# Patient Record
Sex: FEMALE | Race: ASIAN | Employment: OTHER | URBAN - METROPOLITAN AREA
[De-identification: names, ages, dates, MRNs, and addresses within clinical notes are randomized per-mention and may not be internally consistent; named-entity substitution may affect disease eponyms.]

---

## 2017-04-01 ENCOUNTER — HOSPITAL ENCOUNTER (EMERGENCY)
Facility: HOSPITAL | Age: 62
Discharge: HOME | End: 2017-04-01
Attending: EMERGENCY MEDICINE
Payer: COMMERCIAL

## 2017-04-01 VITALS
OXYGEN SATURATION: 97 % | HEIGHT: 64 IN | TEMPERATURE: 99 F | WEIGHT: 131 LBS | BODY MASS INDEX: 22.36 KG/M2 | SYSTOLIC BLOOD PRESSURE: 123 MMHG | RESPIRATION RATE: 16 BRPM | HEART RATE: 74 BPM | DIASTOLIC BLOOD PRESSURE: 85 MMHG

## 2017-04-01 DIAGNOSIS — M79.2 NEURALGIA: Primary | ICD-10-CM

## 2017-04-01 PROCEDURE — 81003 URINALYSIS AUTO W/O SCOPE: CPT | Performed by: EMERGENCY MEDICINE

## 2017-04-01 PROCEDURE — 80053 COMPREHEN METABOLIC PANEL: CPT | Performed by: EMERGENCY MEDICINE

## 2017-04-01 PROCEDURE — 96374 THER/PROPH/DIAG INJ IV PUSH: CPT

## 2017-04-01 PROCEDURE — 93005 ELECTROCARDIOGRAM TRACING: CPT

## 2017-04-01 PROCEDURE — 99285 EMERGENCY DEPT VISIT HI MDM: CPT

## 2017-04-01 PROCEDURE — 99284 EMERGENCY DEPT VISIT MOD MDM: CPT

## 2017-04-01 PROCEDURE — 93010 ELECTROCARDIOGRAM REPORT: CPT

## 2017-04-01 PROCEDURE — 85025 COMPLETE CBC W/AUTO DIFF WBC: CPT | Performed by: EMERGENCY MEDICINE

## 2017-04-01 PROCEDURE — 84484 ASSAY OF TROPONIN QUANT: CPT | Performed by: EMERGENCY MEDICINE

## 2017-04-01 PROCEDURE — 96361 HYDRATE IV INFUSION ADD-ON: CPT

## 2017-04-01 RX ORDER — ONDANSETRON 4 MG/1
TABLET, ORALLY DISINTEGRATING ORAL
Status: DISCONTINUED
Start: 2017-04-01 | End: 2017-04-01

## 2017-04-01 RX ORDER — ONDANSETRON 4 MG/1
4 TABLET, ORALLY DISINTEGRATING ORAL EVERY 4 HOURS PRN
Qty: 10 TABLET | Refills: 0 | Status: SHIPPED | OUTPATIENT
Start: 2017-04-01 | End: 2017-04-08

## 2017-04-01 RX ORDER — HYDROMORPHONE HYDROCHLORIDE 1 MG/ML
INJECTION, SOLUTION INTRAMUSCULAR; INTRAVENOUS; SUBCUTANEOUS EVERY 30 MIN PRN
Status: DISCONTINUED | OUTPATIENT
Start: 2017-04-01 | End: 2017-04-01

## 2017-04-01 RX ORDER — ONDANSETRON 4 MG/1
4 TABLET, ORALLY DISINTEGRATING ORAL ONCE
Status: DISCONTINUED | OUTPATIENT
Start: 2017-04-01 | End: 2017-04-01

## 2017-04-01 RX ORDER — PREGABALIN 75 MG/1
75 CAPSULE ORAL 2 TIMES DAILY
Qty: 60 CAPSULE | Refills: 0 | Status: SHIPPED | OUTPATIENT
Start: 2017-04-01 | End: 2017-04-04

## 2017-04-01 NOTE — ED PROVIDER NOTES
Patient Seen in: [facility] Emergency Department    History   Patient presents with:  Pain (neurologic)    Stated Complaint: pain    HPI    Patient comes in with pain similar to her neuralgia associated with shingles several years ago.   She says at Sebaceous cyst      chest   • Chest pain    • Hip pain    • Hemiparesis (HCC)    • Shingles    • Sinus disease 5/15/2008   • Leg pain      LE pain and weakness   • Sinus infection    • PHN (postherpetic neuralgia)    • Cervical radiculopathy 10/9/2007   • placement of marking clip (2009)    STEREOTACTIC BREAST BIOPSY  2/25/2008    Comment microcalcifications, L breast, and placement of metal clip    CATALINO NEEDLE LOCALIZATION W/ SPECIMEN 1 SITE LEFT  2008    CATALINO NEEDLE LOCALIZATION W/ SPECIMEN 1 SITE RIGHT  19 Temp(Src) 98.5 °F (36.9 °C) (Temporal)  Resp 16  Ht 162.6 cm (5' 4\")  Wt 59.421 kg  BMI 22.47 kg/m2  SpO2 97%        Physical Exam    General: Well-developed, well-nourished, tired, anxious, in mild to moderate distress secondary to pain  Head and neck: N Please view results for these tests on the individual orders. EKG    Rate, intervals and axes as noted on EKG Report.   Rate: 65  Rhythm: Sinus Rhythm  Reading: Incomplete right bundle            MDM     Patient's pain by history sounds like her

## 2017-04-01 NOTE — ED INITIAL ASSESSMENT (HPI)
Pt her for pain in abdomen and pelvis down. Pt as per patient is similar to the pain when she had shingles. Pt notes that she took ibuprofen and it just took the edge off. Sitting worsens the condition.

## 2017-04-04 ENCOUNTER — OFFICE VISIT (OUTPATIENT)
Dept: INTERNAL MEDICINE CLINIC | Facility: CLINIC | Age: 62
End: 2017-04-04

## 2017-04-04 VITALS
HEIGHT: 64 IN | SYSTOLIC BLOOD PRESSURE: 120 MMHG | DIASTOLIC BLOOD PRESSURE: 70 MMHG | RESPIRATION RATE: 16 BRPM | BODY MASS INDEX: 23.22 KG/M2 | WEIGHT: 136 LBS | HEART RATE: 64 BPM

## 2017-04-04 DIAGNOSIS — M54.50 LOW BACK PAIN RADIATING TO BOTH LEGS: Primary | ICD-10-CM

## 2017-04-04 DIAGNOSIS — M79.605 LOW BACK PAIN RADIATING TO BOTH LEGS: Primary | ICD-10-CM

## 2017-04-04 DIAGNOSIS — M79.604 LOW BACK PAIN RADIATING TO BOTH LEGS: Primary | ICD-10-CM

## 2017-04-04 PROCEDURE — 99213 OFFICE O/P EST LOW 20 MIN: CPT | Performed by: NURSE PRACTITIONER

## 2017-04-04 RX ORDER — METHYLPREDNISOLONE 4 MG/1
TABLET ORAL
Qty: 1 KIT | Refills: 0 | Status: SHIPPED | OUTPATIENT
Start: 2017-04-04 | End: 2017-04-11 | Stop reason: ALTCHOICE

## 2017-04-04 NOTE — PROGRESS NOTES
Gerry Gonzalez is a 64year old female. Patient presents with:  Pain: follow up to ER visit, patients states she is having bilateral hip pain that radiates to her groin and down to her feet. States aggrevated when sitting better when standing.   Discharged Cap Take 1 tablet by mouth 2 (two) times daily. Disp:  Rfl:    Calcium 500 MG Oral Tab Take 500 mg by mouth 2 (two) times daily.  Disp:  Rfl:       Past Medical History   Diagnosis Date   • Type II or unspecified type diabetes mellitus without mention of co breast 3/17/2008     left   • Breast calcifications    • Abnormal menses    • Uterine fibroid 9/5/2002     3 discrete uterine fibroids   • Dysphagia    • Hair loss    • Memory deficit    • Plantar fasciitis 11/8/2011   • Exostosis 11/8/2011     off the yoel radiating to both legs  (primary encounter diagnosis) trial medrol dose pack with tylenol. PT once pain improves. See me next week. No orders of the defined types were placed in this encounter.        Meds & Refills for this Visit:  Signed Brutus Snellen

## 2017-04-07 ENCOUNTER — TELEPHONE (OUTPATIENT)
Dept: INTERNAL MEDICINE CLINIC | Facility: CLINIC | Age: 62
End: 2017-04-07

## 2017-04-07 NOTE — TELEPHONE ENCOUNTER
Lm for pt (Bethanie Kraft per HIPAA) to inform, per SD, may start if pain controlled. To call back at the office if any further questions.

## 2017-04-11 ENCOUNTER — OFFICE VISIT (OUTPATIENT)
Dept: INTERNAL MEDICINE CLINIC | Facility: CLINIC | Age: 62
End: 2017-04-11

## 2017-04-11 VITALS
HEART RATE: 80 BPM | DIASTOLIC BLOOD PRESSURE: 80 MMHG | WEIGHT: 133 LBS | BODY MASS INDEX: 22.71 KG/M2 | HEIGHT: 64 IN | SYSTOLIC BLOOD PRESSURE: 110 MMHG | TEMPERATURE: 99 F

## 2017-04-11 DIAGNOSIS — M54.16 LUMBAR RADICULOPATHY: Primary | ICD-10-CM

## 2017-04-11 DIAGNOSIS — R73.9 HYPERGLYCEMIA: ICD-10-CM

## 2017-04-11 PROCEDURE — 99213 OFFICE O/P EST LOW 20 MIN: CPT | Performed by: NURSE PRACTITIONER

## 2017-04-11 NOTE — PROGRESS NOTES
Radha Jackson is a 58year old female. Patient presents with:  Pain: follow up on hip pain, leg pain and upper body pain. She states she is doing better       HPI:   here for follow up of her lumbar radiculopathy.   She is greatly improved following medrol diabetes mellitus without mention of complication, not stated as uncontrolled    • Other and unspecified hyperlipidemia    • Unspecified essential hypertension    • Atypical ductal hyperplasia of breast 7/13/2011   • Lumbar foraminal stenosis    • TMJ synd Exostosis 11/8/2011     off the dorsum of the foot at the 2nd metatarsal phalangeal joint   • Paresthesia    • Oligomenorrhea    • Positive H. pylori test 2005     + H. pylori serology   • HYPERTENSION NOS 8/8/2007   • [facility]est[facility]) ([facility] Hyperglycemia  Controlled. As above. Lumbar radiculopathy   Greatly improved with medrol dose pack. Cont aleve bid or ibuprofen tid. PT for stretching exercises instead of her .      No orders of the defined types were placed in this encount

## 2017-05-30 ENCOUNTER — TELEPHONE (OUTPATIENT)
Dept: INTERNAL MEDICINE CLINIC | Facility: CLINIC | Age: 62
End: 2017-05-30

## 2017-05-30 DIAGNOSIS — E87.1 HYPONATREMIA: Primary | ICD-10-CM

## 2017-05-30 DIAGNOSIS — D69.6 THROMBOCYTOPENIA (HCC): ICD-10-CM

## 2017-05-30 NOTE — TELEPHONE ENCOUNTER
Slight abnormalities noted at time of ov. Orders placed to repeat CMP and CBC to check sodium and platelets  Nonfasting.

## 2017-05-30 NOTE — TELEPHONE ENCOUNTER
vmmltcb to nurses for SD remarks in this message(last labs had slight elev glucose, low Na & Cl). Copy of labs printed and mailed to pt today, could take up to 1 wk to receive.

## 2017-06-28 ENCOUNTER — OFFICE VISIT (OUTPATIENT)
Dept: SURGERY | Facility: CLINIC | Age: 62
End: 2017-06-28

## 2017-06-28 VITALS
HEIGHT: 64 IN | DIASTOLIC BLOOD PRESSURE: 82 MMHG | RESPIRATION RATE: 18 BRPM | HEART RATE: 89 BPM | TEMPERATURE: 99 F | SYSTOLIC BLOOD PRESSURE: 118 MMHG | BODY MASS INDEX: 22.2 KG/M2 | WEIGHT: 130 LBS

## 2017-06-28 DIAGNOSIS — L72.3 SEBACEOUS CYST: Primary | ICD-10-CM

## 2017-06-28 PROCEDURE — 99243 OFF/OP CNSLTJ NEW/EST LOW 30: CPT | Performed by: SURGERY

## 2017-06-28 NOTE — H&P
New Patient Visit Note       Active Problems      1. Sebaceous cyst        Chief Complaint   Patient presents with:  Cyst: New patient referred by Lizzy Al for sebaceous cyst on chest.  PT has had cyst for about 10yrs and started growing about 1yr ago. metatarsal phalangeal joint   • Fatigue 3/6/2012   • Fatigue    • Feels cold 3/6/2012   • Hair loss    • Hand tingling    • Headache    • Hemiparesis (HCC)    • Hip pain    • History of bone density study 10/2/2010   • HYPERTENSION NOS 8/8/2007   • Knee pa STEREOTACTIC BREAST BIOPSY      Comment: microcalcifications, L breast, and placement                of metal clip    The family history and social history have been reviewed by me today.     Family History   Problem Relation Age of Onset   • Other[other] [ palpitations and leg swelling. Gastrointestinal: Negative for abdominal distention, abdominal pain, anal bleeding, blood in stool, constipation, diarrhea, nausea and vomiting.    Genitourinary: Negative for difficulty urinating, dysuria, frequency and urg patient to do the procedure under local versus sedation. The patient prefers sedation. She wishes to think further about having the cyst removed and will call the office when she is ready to schedule the procedure.       Diana Anaya MD

## 2017-07-10 ENCOUNTER — TELEPHONE (OUTPATIENT)
Dept: INTERNAL MEDICINE CLINIC | Facility: CLINIC | Age: 62
End: 2017-07-10

## 2017-07-10 DIAGNOSIS — Z13.21 SCREENING FOR ENDOCRINE, NUTRITIONAL, METABOLIC AND IMMUNITY DISORDER: ICD-10-CM

## 2017-07-10 DIAGNOSIS — Z13.29 SCREENING FOR THYROID DISORDER: ICD-10-CM

## 2017-07-10 DIAGNOSIS — Z13.0 SCREENING FOR BLOOD DISEASE: ICD-10-CM

## 2017-07-10 DIAGNOSIS — Z13.220 SCREENING FOR LIPOID DISORDERS: ICD-10-CM

## 2017-07-10 DIAGNOSIS — Z13.29 SCREENING FOR ENDOCRINE, NUTRITIONAL, METABOLIC AND IMMUNITY DISORDER: ICD-10-CM

## 2017-07-10 DIAGNOSIS — Z13.228 SCREENING FOR ENDOCRINE, NUTRITIONAL, METABOLIC AND IMMUNITY DISORDER: ICD-10-CM

## 2017-07-10 DIAGNOSIS — Z00.00 ROUTINE GENERAL MEDICAL EXAMINATION AT A HEALTH CARE FACILITY: Primary | ICD-10-CM

## 2017-07-10 DIAGNOSIS — Z13.0 SCREENING FOR ENDOCRINE, NUTRITIONAL, METABOLIC AND IMMUNITY DISORDER: ICD-10-CM

## 2017-08-31 ENCOUNTER — TELEPHONE (OUTPATIENT)
Dept: INTERNAL MEDICINE CLINIC | Facility: CLINIC | Age: 62
End: 2017-08-31

## 2017-08-31 DIAGNOSIS — R73.09 ELEVATED GLUCOSE: Primary | ICD-10-CM

## 2017-08-31 NOTE — TELEPHONE ENCOUNTER
Last A1C 8/13/16=5.7. LOV 4/11/17 with SD.  FOV 9/5/17 with AS.    AS, Order for A1C pended. OK to order?

## 2017-08-31 NOTE — TELEPHONE ENCOUNTER
Pt is asking that an A1C order be placed, stated she has it done yearly. Has appt with AS on 9-5-17 for CPE.

## 2017-09-01 ENCOUNTER — LAB ENCOUNTER (OUTPATIENT)
Dept: LAB | Facility: HOSPITAL | Age: 62
End: 2017-09-01
Attending: INTERNAL MEDICINE
Payer: COMMERCIAL

## 2017-09-01 DIAGNOSIS — Z13.228 SCREENING FOR ENDOCRINE, NUTRITIONAL, METABOLIC AND IMMUNITY DISORDER: ICD-10-CM

## 2017-09-01 DIAGNOSIS — D69.6 THROMBOCYTOPENIA (HCC): ICD-10-CM

## 2017-09-01 DIAGNOSIS — Z00.00 ROUTINE GENERAL MEDICAL EXAMINATION AT A HEALTH CARE FACILITY: ICD-10-CM

## 2017-09-01 DIAGNOSIS — Z13.29 SCREENING FOR ENDOCRINE, NUTRITIONAL, METABOLIC AND IMMUNITY DISORDER: ICD-10-CM

## 2017-09-01 DIAGNOSIS — Z13.21 SCREENING FOR ENDOCRINE, NUTRITIONAL, METABOLIC AND IMMUNITY DISORDER: ICD-10-CM

## 2017-09-01 DIAGNOSIS — R73.09 ELEVATED GLUCOSE: ICD-10-CM

## 2017-09-01 DIAGNOSIS — Z13.220 SCREENING FOR LIPOID DISORDERS: ICD-10-CM

## 2017-09-01 DIAGNOSIS — E87.1 HYPONATREMIA: ICD-10-CM

## 2017-09-01 DIAGNOSIS — Z13.0 SCREENING FOR ENDOCRINE, NUTRITIONAL, METABOLIC AND IMMUNITY DISORDER: ICD-10-CM

## 2017-09-01 DIAGNOSIS — Z13.0 SCREENING FOR BLOOD DISEASE: ICD-10-CM

## 2017-09-01 DIAGNOSIS — Z13.29 SCREENING FOR THYROID DISORDER: ICD-10-CM

## 2017-09-01 LAB
ALBUMIN SERPL-MCNC: 4 G/DL (ref 3.5–4.8)
ALP LIVER SERPL-CCNC: 68 U/L (ref 50–130)
ALT SERPL-CCNC: 27 U/L (ref 14–54)
AST SERPL-CCNC: 16 U/L (ref 15–41)
BASOPHILS # BLD AUTO: 0.03 X10(3) UL (ref 0–0.1)
BASOPHILS NFR BLD AUTO: 0.6 %
BILIRUB SERPL-MCNC: 0.6 MG/DL (ref 0.1–2)
BUN BLD-MCNC: 18 MG/DL (ref 8–20)
CALCIUM BLD-MCNC: 9.3 MG/DL (ref 8.3–10.3)
CHLORIDE: 105 MMOL/L (ref 101–111)
CHOLEST SMN-MCNC: 199 MG/DL (ref ?–200)
CO2: 29 MMOL/L (ref 22–32)
CREAT BLD-MCNC: 0.64 MG/DL (ref 0.55–1.02)
EOSINOPHIL # BLD AUTO: 0.03 X10(3) UL (ref 0–0.3)
EOSINOPHIL NFR BLD AUTO: 0.6 %
ERYTHROCYTE [DISTWIDTH] IN BLOOD BY AUTOMATED COUNT: 12.4 % (ref 11.5–16)
EST. AVERAGE GLUCOSE BLD GHB EST-MCNC: 123 MG/DL (ref 68–126)
GLUCOSE BLD-MCNC: 88 MG/DL (ref 70–99)
HBA1C MFR BLD HPLC: 5.9 % (ref ?–5.7)
HCT VFR BLD AUTO: 47 % (ref 34–50)
HDLC SERPL-MCNC: 91 MG/DL (ref 45–?)
HDLC SERPL: 2.19 {RATIO} (ref ?–4.44)
HGB BLD-MCNC: 15.4 G/DL (ref 12–16)
IMMATURE GRANULOCYTE COUNT: 0.01 X10(3) UL (ref 0–1)
IMMATURE GRANULOCYTE RATIO %: 0.2 %
LDLC SERPL CALC-MCNC: 93 MG/DL (ref ?–130)
LDLC SERPL-MCNC: 15 MG/DL (ref 5–40)
LYMPHOCYTES # BLD AUTO: 1.83 X10(3) UL (ref 0.9–4)
LYMPHOCYTES NFR BLD AUTO: 38.5 %
M PROTEIN MFR SERPL ELPH: 7.8 G/DL (ref 6.1–8.3)
MCH RBC QN AUTO: 28.9 PG (ref 27–33.2)
MCHC RBC AUTO-ENTMCNC: 32.8 G/DL (ref 31–37)
MCV RBC AUTO: 88.3 FL (ref 81–100)
MONOCYTES # BLD AUTO: 0.31 X10(3) UL (ref 0.1–0.6)
MONOCYTES NFR BLD AUTO: 6.5 %
NEUTROPHIL ABS PRELIM: 2.54 X10 (3) UL (ref 1.3–6.7)
NEUTROPHILS # BLD AUTO: 2.54 X10(3) UL (ref 1.3–6.7)
NEUTROPHILS NFR BLD AUTO: 53.6 %
NONHDLC SERPL-MCNC: 108 MG/DL (ref ?–130)
PLATELET # BLD AUTO: 173 10(3)UL (ref 150–450)
POTASSIUM SERPL-SCNC: 4.3 MMOL/L (ref 3.6–5.1)
RBC # BLD AUTO: 5.32 X10(6)UL (ref 3.8–5.1)
RED CELL DISTRIBUTION WIDTH-SD: 40 FL (ref 35.1–46.3)
SODIUM SERPL-SCNC: 140 MMOL/L (ref 136–144)
TRIGLYCERIDES: 75 MG/DL (ref ?–150)
TSI SER-ACNC: 3.01 MIU/ML (ref 0.35–5.5)
WBC # BLD AUTO: 4.8 X10(3) UL (ref 4–13)

## 2017-09-01 PROCEDURE — 85025 COMPLETE CBC W/AUTO DIFF WBC: CPT

## 2017-09-01 PROCEDURE — 84443 ASSAY THYROID STIM HORMONE: CPT

## 2017-09-01 PROCEDURE — 80053 COMPREHEN METABOLIC PANEL: CPT

## 2017-09-01 PROCEDURE — 36415 COLL VENOUS BLD VENIPUNCTURE: CPT

## 2017-09-01 PROCEDURE — 83036 HEMOGLOBIN GLYCOSYLATED A1C: CPT

## 2017-09-01 PROCEDURE — 80061 LIPID PANEL: CPT

## 2017-09-05 ENCOUNTER — OFFICE VISIT (OUTPATIENT)
Dept: INTERNAL MEDICINE CLINIC | Facility: CLINIC | Age: 62
End: 2017-09-05

## 2017-09-05 VITALS
SYSTOLIC BLOOD PRESSURE: 120 MMHG | HEIGHT: 64 IN | WEIGHT: 132 LBS | BODY MASS INDEX: 22.53 KG/M2 | RESPIRATION RATE: 16 BRPM | TEMPERATURE: 99 F | HEART RATE: 80 BPM | DIASTOLIC BLOOD PRESSURE: 82 MMHG

## 2017-09-05 DIAGNOSIS — E78.2 MIXED HYPERLIPIDEMIA: ICD-10-CM

## 2017-09-05 DIAGNOSIS — Z00.00 PE (PHYSICAL EXAM), ANNUAL: Primary | ICD-10-CM

## 2017-09-05 DIAGNOSIS — N60.99 ATYPICAL DUCTAL HYPERPLASIA OF BREAST: ICD-10-CM

## 2017-09-05 DIAGNOSIS — Z13.820 SCREENING FOR OSTEOPOROSIS: ICD-10-CM

## 2017-09-05 PROCEDURE — 99396 PREV VISIT EST AGE 40-64: CPT | Performed by: INTERNAL MEDICINE

## 2017-09-05 RX ORDER — INFLUENZA VIRUS VACCINE 15; 15; 15; 15 UG/.5ML; UG/.5ML; UG/.5ML; UG/.5ML
SUSPENSION INTRAMUSCULAR
Refills: 0 | COMMUNITY
Start: 2017-09-02 | End: 2017-09-05
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
## 2017-09-23 ENCOUNTER — HOSPITAL ENCOUNTER (OUTPATIENT)
Dept: BONE DENSITY | Age: 62
Discharge: HOME | End: 2017-09-23
Attending: INTERNAL MEDICINE
Payer: COMMERCIAL

## 2017-09-23 DIAGNOSIS — M81.0 OSTEOPOROSIS: ICD-10-CM

## 2017-09-23 DIAGNOSIS — Z13.820 SCREENING FOR OSTEOPOROSIS: ICD-10-CM

## 2017-09-23 PROCEDURE — 77080 DXA BONE DENSITY AXIAL: CPT | Performed by: INTERNAL MEDICINE

## 2017-09-28 ENCOUNTER — TELEPHONE (OUTPATIENT)
Dept: INTERNAL MEDICINE CLINIC | Facility: CLINIC | Age: 62
End: 2017-09-28

## 2017-11-09 ENCOUNTER — OFFICE VISIT (OUTPATIENT)
Dept: INTERNAL MEDICINE CLINIC | Facility: CLINIC | Age: 62
End: 2017-11-09

## 2017-11-09 VITALS
DIASTOLIC BLOOD PRESSURE: 60 MMHG | SYSTOLIC BLOOD PRESSURE: 114 MMHG | OXYGEN SATURATION: 98 % | HEART RATE: 83 BPM | BODY MASS INDEX: 21.68 KG/M2 | RESPIRATION RATE: 16 BRPM | WEIGHT: 127 LBS | HEIGHT: 64 IN

## 2017-11-09 DIAGNOSIS — J34.89 SINUS PRESSURE: Primary | ICD-10-CM

## 2017-11-09 DIAGNOSIS — F41.9 ANXIETY: ICD-10-CM

## 2017-11-09 DIAGNOSIS — R68.84 JAW PAIN: ICD-10-CM

## 2017-11-09 PROCEDURE — 99213 OFFICE O/P EST LOW 20 MIN: CPT | Performed by: PHYSICIAN ASSISTANT

## 2017-11-09 RX ORDER — FLUTICASONE PROPIONATE 50 MCG
2 SPRAY, SUSPENSION (ML) NASAL DAILY
Qty: 1 BOTTLE | Refills: 0 | Status: SHIPPED | OUTPATIENT
Start: 2017-11-09 | End: 2017-11-30

## 2017-11-09 RX ORDER — ALPRAZOLAM 0.25 MG/1
0.25 TABLET ORAL 2 TIMES DAILY PRN
Qty: 5 TABLET | Refills: 0 | Status: SHIPPED | OUTPATIENT
Start: 2017-11-09 | End: 2018-11-16 | Stop reason: ALTCHOICE

## 2017-11-09 NOTE — PATIENT INSTRUCTIONS
Can use Ibuprofen 200 mg 1-2 tablets by mouth every 8 hours as needed for pain.     Jaw rest for 1-2 weeks, no chewing gum, crunchy foods etc.
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
## 2017-11-10 ENCOUNTER — HOSPITAL ENCOUNTER (OUTPATIENT)
Dept: MAMMOGRAPHY | Age: 62
Discharge: HOME | End: 2017-11-10
Attending: OBSTETRICS & GYNECOLOGY
Payer: COMMERCIAL

## 2017-11-10 DIAGNOSIS — Z12.31 ENCOUNTER FOR SCREENING MAMMOGRAM FOR MALIGNANT NEOPLASM OF BREAST: ICD-10-CM

## 2017-11-10 PROCEDURE — 77067 SCR MAMMO BI INCL CAD: CPT | Performed by: OBSTETRICS & GYNECOLOGY

## 2017-11-30 RX ORDER — FLUTICASONE PROPIONATE 50 MCG
SPRAY, SUSPENSION (ML) NASAL
Qty: 16 G | Refills: 0 | Status: SHIPPED | OUTPATIENT
Start: 2017-11-30 | End: 2018-03-20

## 2018-01-29 RX ORDER — LANCETS
EACH MISCELLANEOUS
Qty: 200 EACH | Refills: 1 | Status: SHIPPED | OUTPATIENT
Start: 2018-01-29 | End: 2018-08-29

## 2018-02-15 ENCOUNTER — TELEPHONE (OUTPATIENT)
Dept: INTERNAL MEDICINE CLINIC | Facility: CLINIC | Age: 63
End: 2018-02-15

## 2018-02-15 NOTE — TELEPHONE ENCOUNTER
Lm for pt ([id number] per HIPAA) will need F/U OV to further discuss/fill out and sign DNR form. Pt will need to fill out portion as well. To call back at the office to schedule or if any further questions.

## 2018-02-15 NOTE — TELEPHONE ENCOUNTER
Patient states she is reading a book and it states that her PCP will have the [place]. She wants to know if we have this form? She states it has to do with not wanting CPR when she is passing away.   She states we can let her know by ca

## 2018-02-22 ENCOUNTER — OFFICE VISIT (OUTPATIENT)
Dept: INTERNAL MEDICINE CLINIC | Facility: CLINIC | Age: 63
End: 2018-02-22

## 2018-02-22 ENCOUNTER — TELEPHONE (OUTPATIENT)
Dept: INTERNAL MEDICINE CLINIC | Facility: CLINIC | Age: 63
End: 2018-02-22

## 2018-02-22 VITALS
HEIGHT: 64 IN | BODY MASS INDEX: 21.51 KG/M2 | RESPIRATION RATE: 16 BRPM | SYSTOLIC BLOOD PRESSURE: 118 MMHG | HEART RATE: 76 BPM | DIASTOLIC BLOOD PRESSURE: 74 MMHG | WEIGHT: 126 LBS | TEMPERATURE: 98 F

## 2018-02-22 DIAGNOSIS — Z13.29 SCREENING FOR THYROID DISORDER: ICD-10-CM

## 2018-02-22 DIAGNOSIS — M85.80 OSTEOPENIA, UNSPECIFIED LOCATION: ICD-10-CM

## 2018-02-22 DIAGNOSIS — L02.221 FURUNCLE OF ABDOMINAL WALL: Primary | ICD-10-CM

## 2018-02-22 DIAGNOSIS — Z13.220 SCREENING FOR LIPID DISORDERS: ICD-10-CM

## 2018-02-22 DIAGNOSIS — Z13.0 SCREENING FOR DISORDER OF BLOOD AND BLOOD-FORMING ORGANS: ICD-10-CM

## 2018-02-22 DIAGNOSIS — Z13.228 SCREENING FOR METABOLIC DISORDER: Primary | ICD-10-CM

## 2018-02-22 PROCEDURE — 99213 OFFICE O/P EST LOW 20 MIN: CPT | Performed by: INTERNAL MEDICINE

## 2018-02-22 RX ORDER — CEPHALEXIN 500 MG/1
500 CAPSULE ORAL 2 TIMES DAILY
Qty: 20 CAPSULE | Refills: 0 | Status: SHIPPED | OUTPATIENT
Start: 2018-02-22 | End: 2018-09-10

## 2018-02-22 NOTE — PROGRESS NOTES
Patient presents with:  Bump: Room 8 AH - started with a black dot on right lower abd and now it has gotten red and getting bigger in size - would also like DEX results       HPI:  Here for a bump on her abdomen, red, raised, non painful.  Pt notes it start chills   • Nocturia    • Oligomenorrhea    • Otalgia    • Other and unspecified hyperlipidemia    • Palpitation    • Paresthesia    • PHN (postherpetic neuralgia)    • Plantar fasciitis 11/8/2011   • Positive H. pylori test 2005    + H. pylori serology   • Multiple Vitamins-Minerals (WOMENS MULTIVITAMIN PLUS) Oral Tab Take 1 tablet by mouth 2 (two) times daily. Disp:  Rfl:    Cholecalciferol (VITAMIN D) 1000 UNITS Oral Cap Take 1 tablet by mouth 2 (two) times daily.  Disp:  Rfl:    Calcium 500 MG Oral Tab T

## 2018-02-22 NOTE — TELEPHONE ENCOUNTER
Pt has CPE with AS 9-10/18 and would like orders sent to [facility] for BW and DEXA SCAN pls.  Pt aware to fast.

## 2018-03-21 RX ORDER — FLUTICASONE PROPIONATE 50 MCG
SPRAY, SUSPENSION (ML) NASAL
Qty: 16 G | Refills: 0 | Status: SHIPPED | OUTPATIENT
Start: 2018-03-21 | End: 2018-06-13

## 2018-05-17 ENCOUNTER — TELEPHONE (OUTPATIENT)
Dept: INTERNAL MEDICINE CLINIC | Facility: CLINIC | Age: 63
End: 2018-05-17

## 2018-05-17 DIAGNOSIS — Z23 NEED FOR SHINGLES VACCINE: Primary | ICD-10-CM

## 2018-05-17 NOTE — TELEPHONE ENCOUNTER
Pt called and wanted to know when she can have the new shingles shot Shingrex. She had a regular shingles shot 3 years ago when she was 61 and wants to know if she can get the new one. Pt would like a call back.

## 2018-05-22 NOTE — TELEPHONE ENCOUNTER
Patient states she call last Thursday and hasn't heard back from us. I let her know that we are out of Shingrix and do not know when we will get more. She states she got the old vaccine many years ago.   She wants to know if she needs to get the new

## 2018-05-22 NOTE — TELEPHONE ENCOUNTER
Spoke with patient, informed ok to have new shingrix vaccine. Patient aware there is a nationwide shortage of the new vaccine and will call back in a few weeks to check if we have received it. Patient verbalized understanding and agreeable to POC.

## 2018-06-14 RX ORDER — FLUTICASONE PROPIONATE 50 MCG
SPRAY, SUSPENSION (ML) NASAL
Qty: 48 G | Refills: 0 | Status: SHIPPED | OUTPATIENT
Start: 2018-06-14 | End: 2019-04-29

## 2018-06-28 ENCOUNTER — TELEPHONE (OUTPATIENT)
Dept: INTERNAL MEDICINE CLINIC | Facility: CLINIC | Age: 63
End: 2018-06-28

## 2018-06-28 NOTE — TELEPHONE ENCOUNTER
Patient notified not due for next TDAP until 3/2020 or sooner if an injury with forrest metal object, animal bite or the main caretaker for grandchild before they are born. Pt verbalizes understanding.

## 2018-08-28 ENCOUNTER — TELEPHONE (OUTPATIENT)
Dept: INTERNAL MEDICINE CLINIC | Facility: CLINIC | Age: 63
End: 2018-08-28

## 2018-08-28 NOTE — TELEPHONE ENCOUNTER
Called pt advised DEXA not due again until 2019, last DEXA 09/23/17 with recommendation to repeat in 2 years.   Pt verbalized understanding and order was canceled

## 2018-08-31 ENCOUNTER — LAB ENCOUNTER (OUTPATIENT)
Dept: LAB | Facility: HOSPITAL | Age: 63
End: 2018-08-31
Attending: INTERNAL MEDICINE
Payer: COMMERCIAL

## 2018-08-31 DIAGNOSIS — Z13.0 SCREENING FOR DISORDER OF BLOOD AND BLOOD-FORMING ORGANS: ICD-10-CM

## 2018-08-31 DIAGNOSIS — Z13.29 SCREENING FOR THYROID DISORDER: ICD-10-CM

## 2018-08-31 DIAGNOSIS — Z13.228 SCREENING FOR METABOLIC DISORDER: ICD-10-CM

## 2018-08-31 DIAGNOSIS — Z13.220 SCREENING FOR LIPID DISORDERS: ICD-10-CM

## 2018-08-31 LAB
ALBUMIN SERPL-MCNC: 4 G/DL (ref 3.5–4.8)
ALBUMIN/GLOB SERPL: 1.1 {RATIO} (ref 1–2)
ALP LIVER SERPL-CCNC: 69 U/L (ref 50–130)
ALT SERPL-CCNC: 28 U/L (ref 14–54)
ANION GAP SERPL CALC-SCNC: 6 MMOL/L (ref 0–18)
AST SERPL-CCNC: 16 U/L (ref 15–41)
BASOPHILS # BLD AUTO: 0.02 X10(3) UL (ref 0–0.1)
BASOPHILS NFR BLD AUTO: 0.6 %
BILIRUB SERPL-MCNC: 0.6 MG/DL (ref 0.1–2)
BUN BLD-MCNC: 15 MG/DL (ref 8–20)
BUN/CREAT SERPL: 23.1 (ref 10–20)
CALCIUM BLD-MCNC: 9.2 MG/DL (ref 8.3–10.3)
CHLORIDE SERPL-SCNC: 104 MMOL/L (ref 101–111)
CHOLEST SMN-MCNC: 213 MG/DL (ref ?–200)
CO2 SERPL-SCNC: 29 MMOL/L (ref 22–32)
CREAT BLD-MCNC: 0.65 MG/DL (ref 0.55–1.02)
EOSINOPHIL # BLD AUTO: 0.05 X10(3) UL (ref 0–0.3)
EOSINOPHIL NFR BLD AUTO: 1.4 %
ERYTHROCYTE [DISTWIDTH] IN BLOOD BY AUTOMATED COUNT: 12.4 % (ref 11.5–16)
GLOBULIN PLAS-MCNC: 3.7 G/DL (ref 2.5–4)
GLUCOSE BLD-MCNC: 83 MG/DL (ref 70–99)
HCT VFR BLD AUTO: 44.6 % (ref 34–50)
HDLC SERPL-MCNC: 93 MG/DL (ref 40–59)
HGB BLD-MCNC: 14.7 G/DL (ref 12–16)
IMMATURE GRANULOCYTE COUNT: 0.01 X10(3) UL (ref 0–1)
IMMATURE GRANULOCYTE RATIO %: 0.3 %
LDLC SERPL CALC-MCNC: 106 MG/DL (ref ?–100)
LYMPHOCYTES # BLD AUTO: 1.38 X10(3) UL (ref 0.9–4)
LYMPHOCYTES NFR BLD AUTO: 38.7 %
M PROTEIN MFR SERPL ELPH: 7.7 G/DL (ref 6.1–8.3)
MCH RBC QN AUTO: 29.3 PG (ref 27–33.2)
MCHC RBC AUTO-ENTMCNC: 33 G/DL (ref 31–37)
MCV RBC AUTO: 88.8 FL (ref 81–100)
MONOCYTES # BLD AUTO: 0.2 X10(3) UL (ref 0.1–1)
MONOCYTES NFR BLD AUTO: 5.6 %
NEUTROPHIL ABS PRELIM: 1.91 X10 (3) UL (ref 1.3–6.7)
NEUTROPHILS # BLD AUTO: 1.91 X10(3) UL (ref 1.3–6.7)
NEUTROPHILS NFR BLD AUTO: 53.4 %
NONHDLC SERPL-MCNC: 120 MG/DL (ref ?–130)
OSMOLALITY SERPL CALC.SUM OF ELEC: 288 MOSM/KG (ref 275–295)
PLATELET # BLD AUTO: 175 10(3)UL (ref 150–450)
POTASSIUM SERPL-SCNC: 4.4 MMOL/L (ref 3.6–5.1)
RBC # BLD AUTO: 5.02 X10(6)UL (ref 3.8–5.1)
RED CELL DISTRIBUTION WIDTH-SD: 40.4 FL (ref 35.1–46.3)
SODIUM SERPL-SCNC: 139 MMOL/L (ref 136–144)
TRIGL SERPL-MCNC: 72 MG/DL (ref 30–149)
TSI SER-ACNC: 3.95 MIU/ML (ref 0.35–5.5)
VLDLC SERPL CALC-MCNC: 14 MG/DL (ref 0–30)
WBC # BLD AUTO: 3.6 X10(3) UL (ref 4–13)

## 2018-08-31 PROCEDURE — 36415 COLL VENOUS BLD VENIPUNCTURE: CPT

## 2018-08-31 PROCEDURE — 85025 COMPLETE CBC W/AUTO DIFF WBC: CPT

## 2018-08-31 PROCEDURE — 84443 ASSAY THYROID STIM HORMONE: CPT

## 2018-08-31 PROCEDURE — 80053 COMPREHEN METABOLIC PANEL: CPT

## 2018-08-31 PROCEDURE — 80061 LIPID PANEL: CPT

## 2018-09-10 ENCOUNTER — LAB ENCOUNTER (OUTPATIENT)
Dept: LAB | Age: 63
End: 2018-09-10
Attending: INTERNAL MEDICINE
Payer: COMMERCIAL

## 2018-09-10 ENCOUNTER — OFFICE VISIT (OUTPATIENT)
Dept: INTERNAL MEDICINE CLINIC | Facility: CLINIC | Age: 63
End: 2018-09-10
Payer: COMMERCIAL

## 2018-09-10 VITALS
RESPIRATION RATE: 16 BRPM | SYSTOLIC BLOOD PRESSURE: 112 MMHG | HEIGHT: 65 IN | BODY MASS INDEX: 21.36 KG/M2 | HEART RATE: 74 BPM | WEIGHT: 128.19 LBS | DIASTOLIC BLOOD PRESSURE: 70 MMHG | TEMPERATURE: 99 F

## 2018-09-10 DIAGNOSIS — Z00.00 PE (PHYSICAL EXAM), ANNUAL: Primary | ICD-10-CM

## 2018-09-10 DIAGNOSIS — Z13.1 DIABETES MELLITUS SCREENING: ICD-10-CM

## 2018-09-10 DIAGNOSIS — Z12.31 VISIT FOR SCREENING MAMMOGRAM: ICD-10-CM

## 2018-09-10 LAB
EST. AVERAGE GLUCOSE BLD GHB EST-MCNC: 114 MG/DL (ref 68–126)
HBA1C MFR BLD HPLC: 5.6 % (ref ?–5.7)

## 2018-09-10 PROCEDURE — 83036 HEMOGLOBIN GLYCOSYLATED A1C: CPT | Performed by: INTERNAL MEDICINE

## 2018-09-10 PROCEDURE — 99396 PREV VISIT EST AGE 40-64: CPT | Performed by: INTERNAL MEDICINE
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
## 2018-09-19 RX ORDER — BLOOD SUGAR DIAGNOSTIC
STRIP MISCELLANEOUS
Qty: 200 EACH | Refills: 3 | Status: SHIPPED | OUTPATIENT
Start: 2018-09-19 | End: 2019-10-24

## 2018-11-07 ENCOUNTER — TELEPHONE (OUTPATIENT)
Dept: INTERNAL MEDICINE CLINIC | Facility: CLINIC | Age: 63
End: 2018-11-07

## 2018-11-07 NOTE — TELEPHONE ENCOUNTER
Called pt back. Per AS, pt needs OV for eval.  Pt agreed to 11/16 at 0815 with AS (preferred to see AS). Pt states she can wait as her symptoms come and go but have started to be more frequent. Pt verbalized understanding of plan and appt.

## 2018-11-07 NOTE — TELEPHONE ENCOUNTER
LOV: 9/10/18 with AS  AS-  Please advise if pt should f/u with dentist, ENT or schedule OV with you to zita, thank you.

## 2018-11-07 NOTE — TELEPHONE ENCOUNTER
Pt has been experiencing a very dry feeling in her mouth/throat, and burning sensation on her tongue on and off for about a month, Can't eat spicy food any more. Should she see a dentist or an ENT or AS?

## 2018-11-13 ENCOUNTER — HOSPITAL ENCOUNTER (OUTPATIENT)
Dept: MAMMOGRAPHY | Age: 63
Discharge: HOME | End: 2018-11-13
Attending: INTERNAL MEDICINE
Payer: COMMERCIAL

## 2018-11-13 DIAGNOSIS — Z12.31 VISIT FOR SCREENING MAMMOGRAM: ICD-10-CM

## 2018-11-13 PROCEDURE — 77063 BREAST TOMOSYNTHESIS BI: CPT | Performed by: INTERNAL MEDICINE

## 2018-11-13 PROCEDURE — 77067 SCR MAMMO BI INCL CAD: CPT | Performed by: INTERNAL MEDICINE

## 2018-11-16 ENCOUNTER — LAB ENCOUNTER (OUTPATIENT)
Dept: LAB | Age: 63
End: 2018-11-16
Attending: INTERNAL MEDICINE
Payer: COMMERCIAL

## 2018-11-16 ENCOUNTER — OFFICE VISIT (OUTPATIENT)
Dept: INTERNAL MEDICINE CLINIC | Facility: CLINIC | Age: 63
End: 2018-11-16
Payer: COMMERCIAL

## 2018-11-16 VITALS
RESPIRATION RATE: 14 BRPM | TEMPERATURE: 98 F | HEIGHT: 64 IN | DIASTOLIC BLOOD PRESSURE: 80 MMHG | HEART RATE: 80 BPM | WEIGHT: 129 LBS | SYSTOLIC BLOOD PRESSURE: 124 MMHG | BODY MASS INDEX: 22.02 KG/M2

## 2018-11-16 DIAGNOSIS — R68.2 DRY MOUTH: ICD-10-CM

## 2018-11-16 DIAGNOSIS — R68.2 DRY MOUTH: Primary | ICD-10-CM

## 2018-11-16 PROCEDURE — 99213 OFFICE O/P EST LOW 20 MIN: CPT | Performed by: INTERNAL MEDICINE

## 2018-11-16 PROCEDURE — 86225 DNA ANTIBODY NATIVE: CPT | Performed by: INTERNAL MEDICINE

## 2018-11-16 PROCEDURE — 86038 ANTINUCLEAR ANTIBODIES: CPT | Performed by: INTERNAL MEDICINE

## 2018-11-16 PROCEDURE — 86235 NUCLEAR ANTIGEN ANTIBODY: CPT | Performed by: INTERNAL MEDICINE

## 2018-11-16 NOTE — PROGRESS NOTES
Patient presents with:  Dryness: Pt is having dry mouth/throat and burning sensation on tongue going on for 1 month. LB-rm 9  Other: Pt is having discomfort on L side of jaw feels like muscle tightness. Pt takes Advil which helps w/ discomfort.       HPI: • Leg pain     LE pain and weakness   • Lumbar foraminal stenosis    • Lumbar sprain    • Memory deficit    • Neck pain    • Night sweats     and chills   • Nocturia    • Oligomenorrhea    • Otalgia    • Other and unspecified hyperlipidemia    • Palpitat Oral Cap Take 1 tablet by mouth 2 (two) times daily. Disp:  Rfl:    Calcium 500 MG Oral Tab Take 500 mg by mouth 2 (two) times daily. Disp:  Rfl:    FLUTICASONE PROPIONATE 50 MCG/ACT Nasal Suspension INSTILL 2 SPRAYS INTO EACH NOSTRIL DAILY.  Disp: 48 g Rfl

## 2018-11-21 ENCOUNTER — TELEPHONE (OUTPATIENT)
Dept: INTERNAL MEDICINE CLINIC | Facility: CLINIC | Age: 63
End: 2018-11-21

## 2018-11-21 DIAGNOSIS — H92.02 LEFT EAR PAIN: Primary | ICD-10-CM

## 2018-11-21 DIAGNOSIS — R13.10 DYSPHAGIA, UNSPECIFIED TYPE: ICD-10-CM

## 2018-11-21 NOTE — TELEPHONE ENCOUNTER
We told pt to make appt w/Dr. Hoff Covert and the first available appt is on 1/8-is that ok?  Please call her and let her know

## 2018-11-21 NOTE — TELEPHONE ENCOUNTER
Spoke with patient informed per AS continue to take pepcid, f/u with patient in a week to see how she is doing. Patient verbalized understanding and agreeable to POC.

## 2018-11-21 NOTE — TELEPHONE ENCOUNTER
Called pt and she wants to know what she can do to minimize her symptoms between now and 1/8/19 appt? ?

## 2018-11-21 NOTE — TELEPHONE ENCOUNTER
Spoke with patient asking what can she do for her current symptoms: dry mouth, dry throat, burning sensation on tongue. Patient currently taking Pepcid which gives her some relief. Please advise.

## 2018-11-21 NOTE — TELEPHONE ENCOUNTER
Pt calling for test results from last friday-AS noted on them but we have not called her with results yet

## 2018-11-28 NOTE — TELEPHONE ENCOUNTER
Called pt to f/u on how she is feeling. Reports she still has dry nose/throat/mouth and burning sensation is a little better but still there. Reports heartburn has improved.   C/o new issue - dull pain underneath L ear down through neck started 1-2 months

## 2018-11-30 NOTE — TELEPHONE ENCOUNTER
Sandy aDmon MD   You Yesterday (8:01 AM)        pt should also see ENT, Rachael Grijalva for eval with swallowing issues, ear discomfort. ENT referral entered per AS. Referral entered. Called pt and notified her, LMTCB re: contact information for ENT.

## 2018-12-03 NOTE — TELEPHONE ENCOUNTER
Spoke with pt to provide ENT contact info for Dr. Nery Polk. Mailed lab results from 11/16/18 to home address as requested. Pt verbalized understanding and agreed with POC.

## 2018-12-07 ENCOUNTER — OFFICE VISIT (OUTPATIENT)
Dept: INTERNAL MEDICINE CLINIC | Facility: CLINIC | Age: 63
End: 2018-12-07
Payer: COMMERCIAL

## 2018-12-07 VITALS
SYSTOLIC BLOOD PRESSURE: 122 MMHG | HEART RATE: 82 BPM | TEMPERATURE: 99 F | OXYGEN SATURATION: 96 % | HEIGHT: 64 IN | RESPIRATION RATE: 16 BRPM | WEIGHT: 128.19 LBS | DIASTOLIC BLOOD PRESSURE: 86 MMHG | BODY MASS INDEX: 21.89 KG/M2

## 2018-12-07 DIAGNOSIS — R68.2 DRY MOUTH: Primary | ICD-10-CM

## 2018-12-07 PROCEDURE — 99213 OFFICE O/P EST LOW 20 MIN: CPT | Performed by: NURSE PRACTITIONER

## 2018-12-07 NOTE — PROGRESS NOTES
Anabell Henderson is a 61year old female. Patient presents with: Follow - Up: ROOM 10-discuss lab results. Having side effects from [facility] [place] such as dizziiness, shaking, excessive saliva, vision going darker and confusion.        HPI:   presnets for ursula mouth 2 (two) times daily. Disp:  Rfl:    Calcium 500 MG Oral Tab Take 500 mg by mouth 2 (two) times daily.  Disp:  Rfl:       Past Medical History:   Diagnosis Date   • Abdominal cramping    • Abnormal menses    • Abscess, groin     R inguinal region   • A 5/15/2008   • Sinus infection    • Sprain of interphalangeal (joint) of hand    • Tendinitis    • TIA (transient ischemic attack) 4/7/2012   • TIA (transient ischemic attack) 4/7/2012   • TMJ syndrome 3/6/2012   • Type II or unspecified type diabetes melli Hold [place] due to reported side effects. Continue biotin PRN   She will await further w/u and evaluation and f/u here as needed. Will call with further questions or concerns. No orders of the defined types were placed in this encounter.       Meds

## 2019-01-08 ENCOUNTER — OFFICE VISIT (OUTPATIENT)
Dept: RHEUMATOLOGY | Facility: CLINIC | Age: 64
End: 2019-01-08
Payer: COMMERCIAL

## 2019-01-08 VITALS
SYSTOLIC BLOOD PRESSURE: 108 MMHG | WEIGHT: 131.19 LBS | HEART RATE: 68 BPM | DIASTOLIC BLOOD PRESSURE: 78 MMHG | BODY MASS INDEX: 23 KG/M2 | RESPIRATION RATE: 16 BRPM

## 2019-01-08 DIAGNOSIS — M19.041 PRIMARY OSTEOARTHRITIS OF BOTH HANDS: ICD-10-CM

## 2019-01-08 DIAGNOSIS — M35.00 SICCA SYNDROME (HCC): Primary | ICD-10-CM

## 2019-01-08 DIAGNOSIS — R76.8 POSITIVE ANA (ANTINUCLEAR ANTIBODY): ICD-10-CM

## 2019-01-08 DIAGNOSIS — R20.0 NUMBNESS OF TONGUE: ICD-10-CM

## 2019-01-08 DIAGNOSIS — R53.83 FATIGUE, UNSPECIFIED TYPE: ICD-10-CM

## 2019-01-08 DIAGNOSIS — D70.9 NEUTROPENIA, UNSPECIFIED TYPE (HCC): ICD-10-CM

## 2019-01-08 DIAGNOSIS — M19.042 PRIMARY OSTEOARTHRITIS OF BOTH HANDS: ICD-10-CM

## 2019-01-08 PROCEDURE — 99245 OFF/OP CONSLTJ NEW/EST HI 55: CPT | Performed by: INTERNAL MEDICINE

## 2019-01-08 NOTE — PROGRESS NOTES
?  RHEUMATOLOGY NEW PATIENT   Date of visit: 1/8/2019  ? Patient presents with:  Establish Care: NP ref  by PCP for dry mouth. Pt states 'having dry feeling in mouth and throat, burning sensation on tongue, problems with swallowing nuts.  Very sensitive vitamin B12, B1 as well as vitamin d levels in addition to ANCA studies. She does have hx of borderline leukopenia so will check immunoglobulin levels and SPEP. She will follow-up in 4 weeks to discuss at length.   Meantime she can continue over-the-counter improved with drinking water. Has not noticed any difficulty with any other food or liquid. Also noticed upset stomach with spicy food. States these symptoms are intermittent in nature and states it does not happen on a daily basis.  Does notice the frequen recurrence of symptoms since then. No history of significant morning stiffness, wrist pain or swelling, pain or swelling of the MCPs, pain or swelling of the ankles and bones of the feet, or new skin nodule formation.   The patient denies hair loss, larry • Fatigue    • Feels cold 3/6/2012   • Hair loss    • Hand tingling    • Headache(784.0)    • Hemiparesis (HCC)    • Hip pain    • History of bone density study 10/2/2010   • HYPERTENSION NOS 8/8/2007   • Knee pain 5/20/2011    mild patellar spurring   • breast, and placement of metal clip     Family History:  Family History   Problem Relation Age of Onset   • Other (Other) Father         cva   • Other (Other) Mother         pneumonia   • Diabetes Brother      Social History:  Social History    Tobacco Use ?  Review of Systems   Constitutional: Negative for malaise/fatigue. HENT: Negative for hearing loss and tinnitus. Eyes: Negative for blurred vision and double vision. Respiratory: Negative for shortness of breath and wheezing.     Cardiovascular: Musculoskeletal:   Diffuse small heberden and katia nodes of the fingers, no basilar joint tenderness of the 1st CMC bilaterally.   No swelling, tenderness, redness or restriction of motion of the DIPs, PIPs, MCPs, wrists, elbows, ankles, or joints of -3.8%            ADDITIONAL FINDINGS:  No significant additional findings.       =====  CONCLUSION:  Bone mineral density values for the lumbar spine, total left hip and left femoral neck are compatible with the diagnosis of osteopenia by WHO definition (T

## 2019-01-09 ENCOUNTER — LAB ENCOUNTER (OUTPATIENT)
Dept: LAB | Age: 64
End: 2019-01-09
Attending: INTERNAL MEDICINE
Payer: COMMERCIAL

## 2019-01-09 DIAGNOSIS — R76.8 POSITIVE ANA (ANTINUCLEAR ANTIBODY): ICD-10-CM

## 2019-01-09 DIAGNOSIS — D70.9 NEUTROPENIA, UNSPECIFIED TYPE (HCC): ICD-10-CM

## 2019-01-09 DIAGNOSIS — R20.0 NUMBNESS OF TONGUE: ICD-10-CM

## 2019-01-09 DIAGNOSIS — R53.83 FATIGUE, UNSPECIFIED TYPE: ICD-10-CM

## 2019-01-09 DIAGNOSIS — M35.00 SICCA SYNDROME (HCC): ICD-10-CM

## 2019-01-09 LAB
HAV AB SERPL IA-ACNC: 1253 PG/ML (ref 193–986)
IMMUNOGLOBULIN A: 152 MG/DL (ref 70–312)
IMMUNOGLOBULIN G: 1240 MG/DL (ref 791–1643)
IMMUNOGLOBULIN M: 113 MG/DL (ref 43–279)
VIT D+METAB SERPL-MCNC: 70.5 NG/ML (ref 30–100)

## 2019-01-09 PROCEDURE — 82784 ASSAY IGA/IGD/IGG/IGM EACH: CPT | Performed by: INTERNAL MEDICINE

## 2019-01-09 PROCEDURE — 82607 VITAMIN B-12: CPT | Performed by: INTERNAL MEDICINE

## 2019-01-09 PROCEDURE — 83876 ASSAY MYELOPEROXIDASE: CPT | Performed by: INTERNAL MEDICINE

## 2019-01-09 PROCEDURE — 86334 IMMUNOFIX E-PHORESIS SERUM: CPT | Performed by: INTERNAL MEDICINE

## 2019-01-09 PROCEDURE — 83516 IMMUNOASSAY NONANTIBODY: CPT | Performed by: INTERNAL MEDICINE

## 2019-01-09 PROCEDURE — 83883 ASSAY NEPHELOMETRY NOT SPEC: CPT | Performed by: INTERNAL MEDICINE

## 2019-01-09 PROCEDURE — 86255 FLUORESCENT ANTIBODY SCREEN: CPT | Performed by: INTERNAL MEDICINE

## 2019-01-09 PROCEDURE — 84165 PROTEIN E-PHORESIS SERUM: CPT | Performed by: INTERNAL MEDICINE

## 2019-01-09 PROCEDURE — 82306 VITAMIN D 25 HYDROXY: CPT | Performed by: INTERNAL MEDICINE

## 2019-01-09 PROCEDURE — 84425 ASSAY OF VITAMIN B-1: CPT | Performed by: INTERNAL MEDICINE

## 2019-01-13 LAB
MYELOPEROX ANTIBODIES, IGG: 0 AU/ML
SERINE PROTEASE3, IGG: 0 AU/ML
VITAMIN B1 (THIAMINE), WHOLE B: 191 NMOL/L

## 2019-01-14 LAB
ALBUMIN SERPL-MCNC: 5.02 G/DL (ref 3.1–4.5)
ALBUMIN/GLOB SERPL: 1.74 {RATIO}
ALPHA1 GLOB SERPL ELPH-MCNC: 0.2 G/DL (ref 0.1–0.3)
ALPHA2 GLOB SERPL ELPH-MCNC: 0.8 G/DL (ref 0.6–1)
B-GLOBULIN SERPL ELPH-MCNC: 0.67 G/DL (ref 0.7–1.2)
GAMMA GLOB SERPL ELPH-MCNC: 1.22 G/DL (ref 0.6–1.6)
KAPPA FREE LIGHT CHAIN: 2.45 MG/DL (ref 0.33–1.94)
KAPPA/LAMBDA FLC RATIO: 0.98 (ref 0.26–1.65)
LAMBDA FREE LIGHT CHAIN: 2.5 MG/DL (ref 0.57–2.63)
MAI PROTEIN SERPL-MCNC: 7.9 G/DL (ref 6.1–8.3)

## 2019-02-06 ENCOUNTER — OFFICE VISIT (OUTPATIENT)
Dept: RHEUMATOLOGY | Facility: CLINIC | Age: 64
End: 2019-02-06
Payer: COMMERCIAL

## 2019-02-06 VITALS
HEART RATE: 60 BPM | BODY MASS INDEX: 22 KG/M2 | WEIGHT: 130 LBS | RESPIRATION RATE: 16 BRPM | SYSTOLIC BLOOD PRESSURE: 102 MMHG | DIASTOLIC BLOOD PRESSURE: 64 MMHG | TEMPERATURE: 99 F

## 2019-02-06 DIAGNOSIS — R53.83 FATIGUE, UNSPECIFIED TYPE: ICD-10-CM

## 2019-02-06 DIAGNOSIS — R76.8 RHEUMATOID FACTOR POSITIVE: ICD-10-CM

## 2019-02-06 DIAGNOSIS — G89.29 CHRONIC MIDLINE LOW BACK PAIN WITH LEFT-SIDED SCIATICA: ICD-10-CM

## 2019-02-06 DIAGNOSIS — M19.041 PRIMARY OSTEOARTHRITIS OF BOTH HANDS: ICD-10-CM

## 2019-02-06 DIAGNOSIS — R76.8 POSITIVE ANA (ANTINUCLEAR ANTIBODY): ICD-10-CM

## 2019-02-06 DIAGNOSIS — M54.32 LEFT SIDED SCIATICA: ICD-10-CM

## 2019-02-06 DIAGNOSIS — M19.042 PRIMARY OSTEOARTHRITIS OF BOTH HANDS: ICD-10-CM

## 2019-02-06 DIAGNOSIS — M54.42 CHRONIC MIDLINE LOW BACK PAIN WITH LEFT-SIDED SCIATICA: ICD-10-CM

## 2019-02-06 DIAGNOSIS — M35.00 SICCA SYNDROME (HCC): Primary | ICD-10-CM

## 2019-02-06 PROCEDURE — 99215 OFFICE O/P EST HI 40 MIN: CPT | Performed by: INTERNAL MEDICINE
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
## 2019-02-07 ENCOUNTER — OFFICE VISIT (OUTPATIENT)
Dept: PHYSICAL THERAPY | Age: 64
End: 2019-02-07
Attending: INTERNAL MEDICINE
Payer: COMMERCIAL

## 2019-02-07 PROCEDURE — 97140 MANUAL THERAPY 1/> REGIONS: CPT

## 2019-02-07 PROCEDURE — 97110 THERAPEUTIC EXERCISES: CPT

## 2019-02-07 PROCEDURE — 97162 PT EVAL MOD COMPLEX 30 MIN: CPT

## 2019-02-07 NOTE — PROGRESS NOTES
LUMBAR EVALUATION:   Referring Physician: Dr. Leonid tSevens  Diagnosis: Chronic midline low back pain with left-sided sciatica    Date of Service: 2/7/2019     PATIENT Martha Martinez is a 61year old female who presents to therapy today with compl sitting for reading, prolonged amb, and stair negotiation. Precautions:  None    OBJECTIVE:   Observation/Posture: decrease lumbar lordosis  Sensation: intact to gross touch in B LE.   Subjective shooting B pain in buttock and posterior leg (not beyond l visits)  1. Pt will have at least 2/10 pain to ease prolonged sitting and amb. 2.  Pt will have no pain with end range lumbar flexion to ease transfers and amb.   3.  Pt will have no TTP in the B piriformis to demonstrate improved tissue mobility to decre

## 2019-02-12 ENCOUNTER — APPOINTMENT (OUTPATIENT)
Dept: PHYSICAL THERAPY | Age: 64
End: 2019-02-12
Attending: INTERNAL MEDICINE
Payer: COMMERCIAL

## 2019-02-14 ENCOUNTER — OFFICE VISIT (OUTPATIENT)
Dept: PHYSICAL THERAPY | Age: 64
End: 2019-02-14
Attending: INTERNAL MEDICINE
Payer: COMMERCIAL

## 2019-02-14 PROCEDURE — 97140 MANUAL THERAPY 1/> REGIONS: CPT

## 2019-02-14 PROCEDURE — 97110 THERAPEUTIC EXERCISES: CPT

## 2019-02-14 NOTE — PROGRESS NOTES
Dx: Chronic midline low back pain with left-sided sciatica         Authorized # of Visits:  Mitzi MYERS         Next MD visit: none scheduled  Fall Risk: standard         Precautions: n/a             Subjective: Pt reports she was not sore after our eval.

## 2019-02-19 ENCOUNTER — OFFICE VISIT (OUTPATIENT)
Dept: PHYSICAL THERAPY | Age: 64
End: 2019-02-19
Attending: INTERNAL MEDICINE
Payer: COMMERCIAL

## 2019-02-19 PROCEDURE — 97140 MANUAL THERAPY 1/> REGIONS: CPT

## 2019-02-19 PROCEDURE — 97110 THERAPEUTIC EXERCISES: CPT

## 2019-02-19 NOTE — PROGRESS NOTES
Dx: Chronic midline low back pain with left-sided sciatica         Authorized # of Visits:  Adriana MYERS         Next MD visit: none scheduled  Fall Risk: standard         Precautions: n/a             Subjective: Pt reports she was a little sore on Friday 2/19/2019  Tx#: 3   STM - B lumbar paraspinals, glut and piriformis L>R 8 min 8 min 8 min   Long axis traction, R/L 2 min 3 min 2 min   Lumbar mobilization Prone, PA, L1-5, Gr II, 3 min Prone, PA, L1-5, Gr II+, 3 min  R Rot, Gr II+ Transverse, R Rot T10-L1

## 2019-02-28 ENCOUNTER — OFFICE VISIT (OUTPATIENT)
Dept: PHYSICAL THERAPY | Age: 64
End: 2019-02-28
Attending: INTERNAL MEDICINE
Payer: COMMERCIAL

## 2019-02-28 PROCEDURE — 97140 MANUAL THERAPY 1/> REGIONS: CPT

## 2019-02-28 PROCEDURE — 97110 THERAPEUTIC EXERCISES: CPT

## 2019-02-28 NOTE — PROGRESS NOTES
Dx: Chronic midline low back pain with left-sided sciatica         Authorized # of Visits:  Ania Lebron PPO         Next MD visit: none scheduled  Fall Risk: standard         Precautions: n/a             Subjective:  Pt notes her back is feeling better.   She Standing, x20, 25#   Hip ext, R/L   *x20, red x20, red   Side stepping    X3, 10 ft, red   Step up, R/L    Lat: x15, 6 inch   Backward walkout    X10, 35#   Note: exercises with (*) are part of the patient's HEP and with (-) are not completed that visit

## 2019-03-05 ENCOUNTER — OFFICE VISIT (OUTPATIENT)
Dept: PHYSICAL THERAPY | Age: 64
End: 2019-03-05
Attending: INTERNAL MEDICINE
Payer: COMMERCIAL

## 2019-03-05 PROCEDURE — 97110 THERAPEUTIC EXERCISES: CPT

## 2019-03-05 PROCEDURE — 97140 MANUAL THERAPY 1/> REGIONS: CPT

## 2019-03-05 NOTE — PROGRESS NOTES
Dx: Chronic midline low back pain with left-sided sciatica         Authorized # of Visits:  [employer]         Next MD visit: none scheduled  Fall Risk: standard         Precautions: n/a             Subjective:  Pt reports she's feeling a lot better.   Sh Step up, R/L    Lat: x15, 6 inch -   Backward walkout    X10, 35# Low incline, x10, 25#   Step over, R/L     X10, 6 inch   [place], R/L     X15, 20#   Note: exercises with (*) are part of the patient's HEP and with (-) are not completed that visit    Ma

## 2019-03-07 ENCOUNTER — OFFICE VISIT (OUTPATIENT)
Dept: PHYSICAL THERAPY | Age: 64
End: 2019-03-07
Attending: INTERNAL MEDICINE
Payer: COMMERCIAL

## 2019-03-07 PROCEDURE — 97140 MANUAL THERAPY 1/> REGIONS: CPT

## 2019-03-07 PROCEDURE — 97110 THERAPEUTIC EXERCISES: CPT

## 2019-03-07 NOTE — PROGRESS NOTES
Dx: Chronic midline low back pain with left-sided sciatica         Authorized # of Visits:  Susan MYERS         Next MD visit: none scheduled  Fall Risk: standard         Precautions: n/a             Subjective:  Pt reports her back is feeling better.   Sh sitting  March, LAQ, x20 March, LAQ, x20 - - -   SB rolling stretch  3 way, x5 3 way, x5  3 way, x5 -   Shuttle - press   DL: 5c, x20  SL: 3c, x10 DL: 5c, x20  SL: 3c, x10 -    Trunk rotation, R/L   Standing, x20, green Standing, x20, 25# - -   Hip ext, R/

## 2019-03-14 ENCOUNTER — OFFICE VISIT (OUTPATIENT)
Dept: PHYSICAL THERAPY | Age: 64
End: 2019-03-14
Attending: INTERNAL MEDICINE
Payer: COMMERCIAL

## 2019-03-14 PROCEDURE — 97140 MANUAL THERAPY 1/> REGIONS: CPT

## 2019-03-14 PROCEDURE — 97110 THERAPEUTIC EXERCISES: CPT

## 2019-03-14 NOTE — PROGRESS NOTES
Discharge Summary  Initial Functional Outcome Score 44/100  Final Functional Outcome Score 70/100  Number of Visits Attended 7 in Physical Therapy   Pt notes her pain is minimal and she has returned to her PLOF.    Assessment: Pt has been treated in PT for amb and transfers. (met)    Plan: d/c with HEP       Patient/Family/Caregiver was advised of these findings, precautions, and treatment options and has agreed to actively participate in planning and for this course of care.     Thank you for your referral. visit    Manual:      Date: 2/7/2019  Tx#:   1 2/14/2019  Tx#: 2 2/19/2019  Tx#: 3 2/28/2019  Tx#: 4 3/5/2019  Tx#: 5 3/7/2019  Tx#: 6 3/14/2019  Tx#; 7   STM - B lumbar paraspinals, glut and piriformis L>R 8 min 8 min 8 min 8 min 8 min 6 min, 4 min distal

## 2019-04-22 ENCOUNTER — OFFICE VISIT (OUTPATIENT)
Dept: INTERNAL MEDICINE CLINIC | Facility: CLINIC | Age: 64
End: 2019-04-22
Payer: COMMERCIAL

## 2019-04-22 VITALS
BODY MASS INDEX: 22.77 KG/M2 | SYSTOLIC BLOOD PRESSURE: 104 MMHG | DIASTOLIC BLOOD PRESSURE: 62 MMHG | HEART RATE: 76 BPM | TEMPERATURE: 99 F | WEIGHT: 133.38 LBS | RESPIRATION RATE: 16 BRPM | HEIGHT: 64 IN | OXYGEN SATURATION: 98 %

## 2019-04-22 DIAGNOSIS — J01.00 ACUTE NON-RECURRENT MAXILLARY SINUSITIS: Primary | ICD-10-CM

## 2019-04-22 PROCEDURE — 99213 OFFICE O/P EST LOW 20 MIN: CPT | Performed by: INTERNAL MEDICINE

## 2019-04-22 RX ORDER — AZITHROMYCIN 250 MG/1
TABLET, FILM COATED ORAL
Qty: 6 TABLET | Refills: 0 | Status: SHIPPED | OUTPATIENT
Start: 2019-04-22 | End: 2019-04-29 | Stop reason: ALTCHOICE

## 2019-04-22 NOTE — PROGRESS NOTES
HPI:   Tammy Slaughter is a 59year old female who presents for upper respiratory symptoms for  1  weeks. Patient reports sore throat, congestion, sinus pain, denies fever.       Current Outpatient Medications:  azithromycin (ZITHROMAX Z-MARY) 250 MG Oral Tab Costochondritis    • Dehydration 4/15/2004   • Depression    • Dry mouth    • Dysphagia    • Exostosis 11/8/2011    off the dorsum of the foot at the 2nd metatarsal phalangeal joint   • Fatigue 3/6/2012   • Fatigue    • Feels cold 3/6/2012   • Hair loss SPECIMEN 1 SITE RIGHT  1999   • NEEDLE BIOPSY RIGHT  2009   • SPECIAL SERVICE OR REPORT      s/p right eye prosthesis   • STEREOTACTIC BREAST BIOPSY  2/25/2008    microcalcifications, L breast, and placement of metal clip      Family History   Problem Rela

## 2019-04-26 ENCOUNTER — TELEPHONE (OUTPATIENT)
Dept: INTERNAL MEDICINE CLINIC | Facility: CLINIC | Age: 64
End: 2019-04-26

## 2019-04-26 NOTE — TELEPHONE ENCOUNTER
Called pt advised no cdc recommendation at this time for MMR booster to all adults. If pt interested in immunity status for measles recommend titer to check immunity status. If titer is negative we would recommend MMR booster. If immune no need.   Pt mansi

## 2019-04-26 NOTE — TELEPHONE ENCOUNTER
Pt is going to [place] next month (5-17-19) and is hearing they have outbreak of measles and she wants to know if she shoud get injection prior to going?

## 2019-04-29 ENCOUNTER — OFFICE VISIT (OUTPATIENT)
Dept: INTERNAL MEDICINE CLINIC | Facility: CLINIC | Age: 64
End: 2019-04-29
Payer: COMMERCIAL

## 2019-04-29 VITALS
DIASTOLIC BLOOD PRESSURE: 76 MMHG | WEIGHT: 131 LBS | HEART RATE: 80 BPM | SYSTOLIC BLOOD PRESSURE: 128 MMHG | HEIGHT: 64 IN | BODY MASS INDEX: 22.36 KG/M2 | TEMPERATURE: 99 F

## 2019-04-29 DIAGNOSIS — J02.9 PHARYNGITIS, UNSPECIFIED ETIOLOGY: ICD-10-CM

## 2019-04-29 DIAGNOSIS — K12.0 APHTHOUS ULCER: Primary | ICD-10-CM

## 2019-04-29 PROCEDURE — 99213 OFFICE O/P EST LOW 20 MIN: CPT | Performed by: NURSE PRACTITIONER

## 2019-04-29 RX ORDER — FLUTICASONE PROPIONATE 50 MCG
SPRAY, SUSPENSION (ML) NASAL
Qty: 48 G | Refills: 3 | Status: SHIPPED | OUTPATIENT
Start: 2019-04-29 | End: 2020-09-16

## 2019-04-29 NOTE — PROGRESS NOTES
Joan Sepulveda is a 59year old female. Patient presents with:  Sore Throat: LG. Room 11. She just finished a zpak on Friday and is still having a sore throat       HPI:   Presents for eval of sore throat. Given zithromax 4/22  Finished on Friday.   connie menses    • Abscess, groin     R inguinal region   • Acute gastroenteritis 4/15/2004   • Anemia    • Arthritis     mild, joint   • Atypical ductal hyperplasia of breast 7/13/2011   • Atypical ductal hyperplasia of breast 3/17/2008    left   • Bloating    • • Type II or unspecified type diabetes mellitus without mention of complication, not stated as uncontrolled    • Unspecified essential hypertension    • URI (upper respiratory infection)    • Urinary retention    • Uterine fibroid 9/5/2002    3 discrete 3    ASSESSMENT AND PLAN:     Aphthous ulcer  (primary encounter diagnosis)  Discussed likely viral in nature. Warm salt gargles. Will try magic mouthwash benadryl/lidocaine/mylanta tid and hs. Discussed use with akash.    She was also instructed to ca

## 2019-05-07 ENCOUNTER — TELEPHONE (OUTPATIENT)
Dept: INTERNAL MEDICINE CLINIC | Facility: CLINIC | Age: 64
End: 2019-05-07

## 2019-05-07 DIAGNOSIS — R13.10 DYSPHAGIA, UNSPECIFIED TYPE: ICD-10-CM

## 2019-05-07 DIAGNOSIS — Z13.0 SCREENING FOR DISORDER OF BLOOD AND BLOOD-FORMING ORGANS: ICD-10-CM

## 2019-05-07 DIAGNOSIS — R20.2 TINGLING: ICD-10-CM

## 2019-05-07 DIAGNOSIS — M85.821 OSTEOPENIA OF RIGHT UPPER ARM: Primary | ICD-10-CM

## 2019-05-07 DIAGNOSIS — R73.09 ELEVATED GLUCOSE: ICD-10-CM

## 2019-05-07 DIAGNOSIS — Z13.228 SCREENING FOR METABOLIC DISORDER: ICD-10-CM

## 2019-05-07 DIAGNOSIS — E78.2 MIXED HYPERLIPIDEMIA: ICD-10-CM

## 2019-05-07 DIAGNOSIS — Z13.220 SCREENING FOR LIPID DISORDERS: ICD-10-CM

## 2019-05-07 DIAGNOSIS — F32.A DEPRESSION, UNSPECIFIED DEPRESSION TYPE: ICD-10-CM

## 2019-05-07 DIAGNOSIS — D64.9 ANEMIA, UNSPECIFIED TYPE: ICD-10-CM

## 2019-05-07 DIAGNOSIS — Z13.29 SCREENING FOR THYROID DISORDER: ICD-10-CM

## 2019-05-07 DIAGNOSIS — R53.1 WEAKNESS: ICD-10-CM

## 2019-05-07 DIAGNOSIS — Z00.00 ROUTINE GENERAL MEDICAL EXAMINATION AT A HEALTH CARE FACILITY: Primary | ICD-10-CM

## 2019-05-07 NOTE — TELEPHONE ENCOUNTER
Pt is requesting a DEXA scan order. Is she due for one? She stated her last one was 2017 is it every 2 years?  Please advise

## 2019-05-07 NOTE — TELEPHONE ENCOUNTER
CPE   Future Appointments   Date Time Provider Doyle Jeanne                 9/16/2019 10:15 AM Sandy Damon MD [facility]     Orders to THE [facility] aware must fast no call back required     Pt is requesting an A1C she stated she is diabeti

## 2019-05-22 ENCOUNTER — OFFICE VISIT (OUTPATIENT)
Dept: INTERNAL MEDICINE CLINIC | Facility: CLINIC | Age: 64
End: 2019-05-22
Payer: COMMERCIAL

## 2019-05-22 VITALS
DIASTOLIC BLOOD PRESSURE: 70 MMHG | HEART RATE: 89 BPM | OXYGEN SATURATION: 98 % | TEMPERATURE: 99 F | WEIGHT: 131 LBS | SYSTOLIC BLOOD PRESSURE: 110 MMHG | HEIGHT: 64 IN | BODY MASS INDEX: 22.36 KG/M2

## 2019-05-22 DIAGNOSIS — J06.9 ACUTE URI: ICD-10-CM

## 2019-05-22 DIAGNOSIS — J34.89 SINUS PRESSURE: ICD-10-CM

## 2019-05-22 DIAGNOSIS — K12.1 ORAL ULCER: Primary | ICD-10-CM

## 2019-05-22 PROCEDURE — 99213 OFFICE O/P EST LOW 20 MIN: CPT | Performed by: NURSE PRACTITIONER

## 2019-05-22 RX ORDER — CEFUROXIME AXETIL 500 MG/1
500 TABLET ORAL 2 TIMES DAILY
Qty: 20 TABLET | Refills: 0 | Status: SHIPPED | OUTPATIENT
Start: 2019-05-22 | End: 2019-09-16

## 2019-05-22 RX ORDER — PREDNISONE 10 MG/1
10 TABLET ORAL DAILY
Qty: 10 TABLET | Refills: 0 | Status: SHIPPED | OUTPATIENT
Start: 2019-05-22 | End: 2019-06-01

## 2019-05-22 NOTE — PROGRESS NOTES
Tammy Slaughter is a 59year old female. Patient presents with:  Sinus Problem: LG. Room 11. Sinus pressure, pain behind her eyes, tooth pain and facial since yesterday       HPI:   Presents for eval of pressure in her head and tooth pain.    Upper teeth and Oral Tab Take 1 tablet by mouth 2 (two) times daily. Disp:  Rfl:    Cholecalciferol (VITAMIN D) 1000 UNITS Oral Cap Take 1 tablet by mouth 2 (two) times daily. Disp:  Rfl:    Calcium 500 MG Oral Tab Take 500 mg by mouth 2 (two) times daily.  Disp:  Rfl: Postmenopausal    • Scalp contusion 12/31/2010   • Sebaceous cyst     chest   • Shingles    • Shoulder pain    • Sinus disease 5/15/2008   • Sinus infection    • Sprain of interphalangeal (joint) of hand    • Tendinitis    • TIA (transient ischemic attack) no apparent distress  HEENT: atraumatic, normocephalic,ears and throat are clear  Maxillary and frontal sinus tenderness. Mild irritaiton of hard roof of mouth noted. No blisters.    NECK: supple,no adenopathy,  LUNGS: normal rate without respiratory dist

## 2019-07-01 ENCOUNTER — MED REC SCAN ONLY (OUTPATIENT)
Dept: INTERNAL MEDICINE CLINIC | Facility: CLINIC | Age: 64
End: 2019-07-01

## 2019-07-31 ENCOUNTER — APPOINTMENT (OUTPATIENT)
Dept: LAB | Age: 64
End: 2019-07-31
Attending: INTERNAL MEDICINE
Payer: COMMERCIAL

## 2019-07-31 DIAGNOSIS — R76.8 RHEUMATOID FACTOR POSITIVE: ICD-10-CM

## 2019-07-31 DIAGNOSIS — M35.00 SICCA SYNDROME (HCC): ICD-10-CM

## 2019-07-31 DIAGNOSIS — R76.8 POSITIVE ANA (ANTINUCLEAR ANTIBODY): ICD-10-CM

## 2019-07-31 LAB — RHEUMATOID FACT SERPL-ACNC: 22 IU/ML (ref ?–15)

## 2019-07-31 PROCEDURE — 86431 RHEUMATOID FACTOR QUANT: CPT | Performed by: INTERNAL MEDICINE

## 2019-07-31 PROCEDURE — 86038 ANTINUCLEAR ANTIBODIES: CPT | Performed by: INTERNAL MEDICINE

## 2019-08-02 LAB — ANA SCREEN: NEGATIVE

## 2019-08-07 ENCOUNTER — OFFICE VISIT (OUTPATIENT)
Dept: RHEUMATOLOGY | Facility: CLINIC | Age: 64
End: 2019-08-07
Payer: COMMERCIAL

## 2019-08-07 VITALS
HEART RATE: 68 BPM | WEIGHT: 129 LBS | BODY MASS INDEX: 22 KG/M2 | TEMPERATURE: 99 F | RESPIRATION RATE: 16 BRPM | DIASTOLIC BLOOD PRESSURE: 64 MMHG | SYSTOLIC BLOOD PRESSURE: 108 MMHG

## 2019-08-07 DIAGNOSIS — M35.00 SICCA SYNDROME (HCC): Primary | ICD-10-CM

## 2019-08-07 DIAGNOSIS — M19.042 PRIMARY OSTEOARTHRITIS OF BOTH HANDS: ICD-10-CM

## 2019-08-07 DIAGNOSIS — R76.8 POSITIVE ANA (ANTINUCLEAR ANTIBODY): ICD-10-CM

## 2019-08-07 DIAGNOSIS — M26.609 TMJ (TEMPOROMANDIBULAR JOINT DISORDER): ICD-10-CM

## 2019-08-07 DIAGNOSIS — M19.041 PRIMARY OSTEOARTHRITIS OF BOTH HANDS: ICD-10-CM

## 2019-08-07 DIAGNOSIS — R53.83 FATIGUE, UNSPECIFIED TYPE: ICD-10-CM

## 2019-08-07 DIAGNOSIS — R76.8 RHEUMATOID FACTOR POSITIVE: ICD-10-CM

## 2019-08-07 PROCEDURE — 99214 OFFICE O/P EST MOD 30 MIN: CPT | Performed by: INTERNAL MEDICINE

## 2019-08-07 RX ORDER — MELOXICAM 7.5 MG/1
7.5 TABLET ORAL 2 TIMES DAILY PRN
Qty: 30 TABLET | Refills: 0 | Status: SHIPPED | OUTPATIENT
Start: 2019-08-07 | End: 2019-12-11

## 2019-08-07 NOTE — PATIENT INSTRUCTIONS
-- take meloxicam twice daily with food x 1 week then stop, keep meloxicam and use as needed after that one week.    -- avoid ibuprofen while taking meloxicam   -- go to PT for TMJ arthritis   -- follow up in 4 months or sooner as needed  -- repeat labs dolores

## 2019-08-07 NOTE — PROGRESS NOTES
RHEUMATOLOGY Follow up   Date of visit: 8/7/2019  ? Patient presents with:  Sjogren's Syndrome: Pt states 'is doing better with dry mouth. Does have pain on both sides of mouth by upper molars and will get neck pain with headaches. Did have a sinus infec HERNESTO (AUTOMATED); Future  -     RHEUMATOID ARTHRITIS FACTOR; Future  -     CYCLIC CITRULLINATE PEP.  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 Also noticed significant difficulty eating nuts. Has sensation of food getting stuck, improved with drinking water. Has not noticed any difficulty with any other food or liquid. Also noticed upset stomach with spicy food. her teeth and have TMJ.    Hx of \"pinched nerve\" in her back which she went through PT for and has not had significant recurrence of symptoms since then.      No history of significant morning stiffness, wrist pain or swelling, pain or swelling of the MCP • Exostosis 11/8/2011    off the dorsum of the foot at the 2nd metatarsal phalangeal joint   • Fatigue 3/6/2012   • Fatigue    • Feels cold 3/6/2012   • Hair loss    • Hand tingling    • Headache(784.0)    • Hemiparesis (HCC)    • Hip pain    • History SERVICE OR REPORT      s/p right eye prosthesis   • STEREOTACTIC BREAST BIOPSY  2/25/2008    microcalcifications, L breast, and placement of metal clip     Family History:  Family History   Problem Relation Age of Onset   • Other (Other) Father         cva itching and rash. Neurological: Positive for headaches. Negative for dizziness and weakness. Endo/Heme/Allergies: Does not bruise/bleed easily. Psychiatric/Behavioral: Negative for depression.  The patient is not nervous/anxious and does not have inso person, place, and time. No cranial nerve deficit. Skin: Skin is warm and dry. No rash noted. She is not diaphoretic. No erythema. Psychiatric: She has a normal mood and affect. Her behavior is normal.   Nursing note and vitals reviewed.     ?  Radiolog

## 2019-08-29 ENCOUNTER — TELEPHONE (OUTPATIENT)
Dept: INTERNAL MEDICINE CLINIC | Facility: CLINIC | Age: 64
End: 2019-08-29

## 2019-08-29 NOTE — TELEPHONE ENCOUNTER
Pt called and stated that she would like she would like a recommendation to a podiatrist because she wants an orthotic insert for her shoes       Please advise

## 2019-08-30 ENCOUNTER — HOSPITAL ENCOUNTER (OUTPATIENT)
Dept: PHYSICAL THERAPY | Facility: HOSPITAL | Age: 64
Setting detail: THERAPIES SERIES
Discharge: HOME | End: 2019-08-30
Attending: INTERNAL MEDICINE
Payer: COMMERCIAL

## 2019-08-30 DIAGNOSIS — M26.609 TMJ (TEMPOROMANDIBULAR JOINT DISORDER): ICD-10-CM

## 2019-08-30 PROCEDURE — 97162 PT EVAL MOD COMPLEX 30 MIN: CPT | Performed by: PHYSICAL THERAPIST

## 2019-08-30 PROCEDURE — 97110 THERAPEUTIC EXERCISES: CPT | Performed by: PHYSICAL THERAPIST

## 2019-08-30 NOTE — PROGRESS NOTES
SPINE EVALUATION:   Referring Physician: Dr. Tommie Perry  Diagnosis: TMJ (temporomandibular joint disorder) (M26.609)        Date of Service: 8/30/2019     PATIENT SUMMARY   Leandro Carreno is a 59year old female who presents to therapy today with complaints of radiculopathy (10/9/2007), Cervical strain (3/22/2007), Chest pain, CHF (congestive heart failure) ([address] CHF (congestive heart failure) ([facility] Costochondritis, Dehydration (4/15/2004), Depression, Dry mouth, Dysphagia, Exostosis (11/8/2011), Fatigue (3/6/ most ADL;s require time and occ pain . Limited with ADL's . Signs and symptoms are consistent with diagnosis of SCM pain, upper cervical dysfunction . Pt and PT discussed evaluation findings, pathology, POC and HEP.   Pt voiced understanding and performs mechanics. Today’s Treatment and Response:   Pt education was provided on exam findings, treatment diagnosis, treatment plan, expectations, and prognosis.  Pt was also provided recommendations for activity modifications, possible soreness after evaluatio Therapy, Neuromuscular Re-education, Self-Care Home Management, Therapeutic Activities, Therapeutic Exercise and Home Exercise Program instruction    Education or treatment limitation: None  Rehab Potential:good    FOTO: 49/100    Patient/Family/Caregiver

## 2019-09-03 ENCOUNTER — HOSPITAL ENCOUNTER (OUTPATIENT)
Dept: PHYSICAL THERAPY | Facility: HOSPITAL | Age: 64
Setting detail: THERAPIES SERIES
Discharge: HOME | End: 2019-09-03
Attending: INTERNAL MEDICINE
Payer: COMMERCIAL

## 2019-09-03 ENCOUNTER — TELEPHONE (OUTPATIENT)
Dept: PODIATRY CLINIC | Facility: CLINIC | Age: 64
End: 2019-09-03

## 2019-09-03 PROCEDURE — 97110 THERAPEUTIC EXERCISES: CPT | Performed by: PHYSICAL THERAPIST

## 2019-09-03 PROCEDURE — 97140 MANUAL THERAPY 1/> REGIONS: CPT | Performed by: PHYSICAL THERAPIST

## 2019-09-03 NOTE — TELEPHONE ENCOUNTER
Patient stopped by the office on Friday 30, 2019, complaining that about 1 year and 8 months we dispense the wrong orthotics. Since then , she's been having foot pain.  I examined orthotics and they were from a different [facility] OFFICE AND DIFFERENT PATIENTS NA

## 2019-09-03 NOTE — PROGRESS NOTES
Dx: TMJ (temporomandibular joint disorder) (M26.609)            Insurance (Authorized # of Visits):  6           Authorizing Physician: Dr. Cheo Maloney  Next MD visit: none scheduled  Fall Risk: standard         Precautions: n/a             Subjective: Exercise

## 2019-09-05 ENCOUNTER — HOSPITAL ENCOUNTER (OUTPATIENT)
Dept: PHYSICAL THERAPY | Facility: HOSPITAL | Age: 64
Setting detail: THERAPIES SERIES
Discharge: HOME | End: 2019-09-05
Attending: INTERNAL MEDICINE
Payer: COMMERCIAL

## 2019-09-05 ENCOUNTER — HOSPITAL ENCOUNTER (OUTPATIENT)
Dept: BONE DENSITY | Age: 64
Discharge: HOME | End: 2019-09-05
Attending: INTERNAL MEDICINE
Payer: COMMERCIAL

## 2019-09-05 DIAGNOSIS — M85.821 OSTEOPENIA OF RIGHT UPPER ARM: ICD-10-CM

## 2019-09-05 PROCEDURE — 77080 DXA BONE DENSITY AXIAL: CPT | Performed by: INTERNAL MEDICINE

## 2019-09-05 PROCEDURE — 97110 THERAPEUTIC EXERCISES: CPT | Performed by: PHYSICAL THERAPIST

## 2019-09-05 PROCEDURE — 97140 MANUAL THERAPY 1/> REGIONS: CPT | Performed by: PHYSICAL THERAPIST

## 2019-09-06 ENCOUNTER — LAB ENCOUNTER (OUTPATIENT)
Dept: LAB | Facility: HOSPITAL | Age: 64
End: 2019-09-06
Attending: INTERNAL MEDICINE
Payer: COMMERCIAL

## 2019-09-06 DIAGNOSIS — Z13.220 SCREENING FOR LIPID DISORDERS: ICD-10-CM

## 2019-09-06 DIAGNOSIS — E78.2 MIXED HYPERLIPIDEMIA: ICD-10-CM

## 2019-09-06 DIAGNOSIS — Z00.00 ROUTINE GENERAL MEDICAL EXAMINATION AT A HEALTH CARE FACILITY: ICD-10-CM

## 2019-09-06 DIAGNOSIS — Z13.228 SCREENING FOR METABOLIC DISORDER: ICD-10-CM

## 2019-09-06 DIAGNOSIS — Z13.0 SCREENING FOR DISORDER OF BLOOD AND BLOOD-FORMING ORGANS: ICD-10-CM

## 2019-09-06 DIAGNOSIS — R73.09 ELEVATED GLUCOSE: ICD-10-CM

## 2019-09-06 DIAGNOSIS — R20.2 TINGLING: ICD-10-CM

## 2019-09-06 DIAGNOSIS — D64.9 ANEMIA, UNSPECIFIED TYPE: ICD-10-CM

## 2019-09-06 DIAGNOSIS — Z13.29 SCREENING FOR THYROID DISORDER: ICD-10-CM

## 2019-09-06 DIAGNOSIS — R53.1 WEAKNESS: ICD-10-CM

## 2019-09-06 DIAGNOSIS — F32.A DEPRESSION, UNSPECIFIED DEPRESSION TYPE: ICD-10-CM

## 2019-09-06 DIAGNOSIS — R13.10 DYSPHAGIA, UNSPECIFIED TYPE: ICD-10-CM

## 2019-09-06 LAB
ALBUMIN SERPL-MCNC: 4 G/DL (ref 3.4–5)
ALBUMIN/GLOB SERPL: 1.1 {RATIO} (ref 1–2)
ALP LIVER SERPL-CCNC: 77 U/L (ref 50–130)
ALT SERPL-CCNC: 28 U/L (ref 13–56)
ANION GAP SERPL CALC-SCNC: 7 MMOL/L (ref 0–18)
AST SERPL-CCNC: 22 U/L (ref 15–37)
BASOPHILS # BLD AUTO: 0.03 X10(3) UL (ref 0–0.2)
BASOPHILS NFR BLD AUTO: 0.8 %
BILIRUB SERPL-MCNC: 0.6 MG/DL (ref 0.1–2)
BUN BLD-MCNC: 13 MG/DL (ref 7–18)
BUN/CREAT SERPL: 28.9 (ref 10–20)
CALCIUM BLD-MCNC: 9.1 MG/DL (ref 8.5–10.1)
CHLORIDE SERPL-SCNC: 104 MMOL/L (ref 98–112)
CHOLEST SMN-MCNC: 196 MG/DL (ref ?–200)
CO2 SERPL-SCNC: 27 MMOL/L (ref 21–32)
CREAT BLD-MCNC: 0.45 MG/DL (ref 0.55–1.02)
CREAT UR-SCNC: 55.6 MG/DL
DEPRECATED RDW RBC AUTO: 39.6 FL (ref 35.1–46.3)
EOSINOPHIL # BLD AUTO: 0.02 X10(3) UL (ref 0–0.7)
EOSINOPHIL NFR BLD AUTO: 0.5 %
ERYTHROCYTE [DISTWIDTH] IN BLOOD BY AUTOMATED COUNT: 12.4 % (ref 11–15)
EST. AVERAGE GLUCOSE BLD GHB EST-MCNC: 120 MG/DL (ref 68–126)
GLOBULIN PLAS-MCNC: 3.8 G/DL (ref 2.8–4.4)
GLUCOSE BLD-MCNC: 80 MG/DL (ref 70–99)
HBA1C MFR BLD HPLC: 5.8 % (ref ?–5.7)
HCT VFR BLD AUTO: 43.2 % (ref 35–48)
HDLC SERPL-MCNC: 85 MG/DL (ref 40–59)
HGB BLD-MCNC: 14.7 G/DL (ref 12–16)
IMM GRANULOCYTES # BLD AUTO: 0 X10(3) UL (ref 0–1)
IMM GRANULOCYTES NFR BLD: 0 %
LDLC SERPL CALC-MCNC: 96 MG/DL (ref ?–100)
LYMPHOCYTES # BLD AUTO: 1.6 X10(3) UL (ref 1–4)
LYMPHOCYTES NFR BLD AUTO: 42.1 %
M PROTEIN MFR SERPL ELPH: 7.8 G/DL (ref 6.4–8.2)
MCH RBC QN AUTO: 29.9 PG (ref 26–34)
MCHC RBC AUTO-ENTMCNC: 34 G/DL (ref 31–37)
MCV RBC AUTO: 87.8 FL (ref 80–100)
MICROALBUMIN UR-MCNC: 0.56 MG/DL
MICROALBUMIN/CREAT 24H UR-RTO: 10.1 UG/MG (ref ?–30)
MONOCYTES # BLD AUTO: 0.21 X10(3) UL (ref 0.1–1)
MONOCYTES NFR BLD AUTO: 5.5 %
NEUTROPHILS # BLD AUTO: 1.94 X10 (3) UL (ref 1.5–7.7)
NEUTROPHILS # BLD AUTO: 1.94 X10(3) UL (ref 1.5–7.7)
NEUTROPHILS NFR BLD AUTO: 51.1 %
NONHDLC SERPL-MCNC: 111 MG/DL (ref ?–130)
OSMOLALITY SERPL CALC.SUM OF ELEC: 285 MOSM/KG (ref 275–295)
PLATELET # BLD AUTO: 162 10(3)UL (ref 150–450)
POTASSIUM SERPL-SCNC: 4.2 MMOL/L (ref 3.5–5.1)
RBC # BLD AUTO: 4.92 X10(6)UL (ref 3.8–5.3)
SODIUM SERPL-SCNC: 138 MMOL/L (ref 136–145)
T4 FREE SERPL-MCNC: 0.8 NG/DL (ref 0.8–1.7)
TRIGL SERPL-MCNC: 73 MG/DL (ref 30–149)
TSI SER-ACNC: 4.11 MIU/ML (ref 0.36–3.74)
VLDLC SERPL CALC-MCNC: 15 MG/DL (ref 0–30)
WBC # BLD AUTO: 3.8 X10(3) UL (ref 4–11)

## 2019-09-06 PROCEDURE — 84439 ASSAY OF FREE THYROXINE: CPT

## 2019-09-06 PROCEDURE — 85025 COMPLETE CBC W/AUTO DIFF WBC: CPT

## 2019-09-06 PROCEDURE — 84443 ASSAY THYROID STIM HORMONE: CPT

## 2019-09-06 PROCEDURE — 82043 UR ALBUMIN QUANTITATIVE: CPT

## 2019-09-06 PROCEDURE — 82570 ASSAY OF URINE CREATININE: CPT

## 2019-09-06 PROCEDURE — 80061 LIPID PANEL: CPT

## 2019-09-06 PROCEDURE — 80053 COMPREHEN METABOLIC PANEL: CPT

## 2019-09-06 PROCEDURE — 36415 COLL VENOUS BLD VENIPUNCTURE: CPT

## 2019-09-06 PROCEDURE — 83036 HEMOGLOBIN GLYCOSYLATED A1C: CPT

## 2019-09-06 NOTE — PROGRESS NOTES
Dx: TMJ (temporomandibular joint disorder) (M26.609)            Insurance (Authorized # of Visits):  6           Authorizing Physician: Dr. Berkley Martin  Next MD visit: none scheduled  Fall Risk: standard         Precautions: n/a             Subjective: Feel  A

## 2019-09-09 RX ORDER — LANCETS
EACH MISCELLANEOUS
Qty: 200 EACH | Refills: 2 | Status: SHIPPED | OUTPATIENT
Start: 2019-09-09

## 2019-09-09 NOTE — TELEPHONE ENCOUNTER
Last Ov: 5/22/19, SD, acute  Upcoming appt: 9/16/19  Last labs: CBC, CMP, Lipid, TSH w Ref T4, A1c, Microalb/creat, Free T4 9/6/19  Last Rx: microlet lancets, #200, 1R 3/16/19    Per Protocol, passed. Refill sent.

## 2019-09-10 ENCOUNTER — HOSPITAL ENCOUNTER (OUTPATIENT)
Dept: PHYSICAL THERAPY | Facility: HOSPITAL | Age: 64
Setting detail: THERAPIES SERIES
Discharge: HOME | End: 2019-09-10
Attending: INTERNAL MEDICINE
Payer: COMMERCIAL

## 2019-09-10 PROCEDURE — 97110 THERAPEUTIC EXERCISES: CPT | Performed by: PHYSICAL THERAPIST

## 2019-09-10 PROCEDURE — 97140 MANUAL THERAPY 1/> REGIONS: CPT | Performed by: PHYSICAL THERAPIST

## 2019-09-10 NOTE — PROGRESS NOTES
Dx: TMJ (temporomandibular joint disorder) (M26.609)            Insurance (Authorized # of Visits):  6           Authorizing Physician: Dr. Toney Mendoza  Next MD visit: none scheduled  Fall Risk: standard         Precautions: n/a             Subjective: Have [employer] wage [employer], full time

## 2019-09-12 ENCOUNTER — HOSPITAL ENCOUNTER (OUTPATIENT)
Dept: PHYSICAL THERAPY | Facility: HOSPITAL | Age: 64
Setting detail: THERAPIES SERIES
Discharge: HOME | End: 2019-09-12
Attending: INTERNAL MEDICINE
Payer: COMMERCIAL

## 2019-09-12 PROCEDURE — 97110 THERAPEUTIC EXERCISES: CPT | Performed by: PHYSICAL THERAPIST

## 2019-09-12 PROCEDURE — 97140 MANUAL THERAPY 1/> REGIONS: CPT | Performed by: PHYSICAL THERAPIST

## 2019-09-12 NOTE — PROGRESS NOTES
Dx: TMJ (temporomandibular joint disorder) (M26.609)            Insurance (Authorized # of Visits):  6           Authorizing Physician: Dr. Charmayne Harvey  Next MD visit: none scheduled  Fall Risk: standard         Precautions: n/a             Subjective: Felt a l x10 reps each  Prone over sb 2#   Rows  Scaption   Superman   X20    Foam beam super 6 x10 reps     st to UT, levators, SCM bilaterally x18'  Masseters, temporalis, digastrics x4' st to UT, levators, SCM bilaterally x18'  Masseters, temporalis, digastrics

## 2019-09-16 ENCOUNTER — OFFICE VISIT (OUTPATIENT)
Dept: INTERNAL MEDICINE CLINIC | Facility: CLINIC | Age: 64
End: 2019-09-16
Payer: COMMERCIAL

## 2019-09-16 VITALS
HEART RATE: 78 BPM | DIASTOLIC BLOOD PRESSURE: 64 MMHG | BODY MASS INDEX: 21.94 KG/M2 | TEMPERATURE: 98 F | HEIGHT: 64 IN | WEIGHT: 128.5 LBS | SYSTOLIC BLOOD PRESSURE: 122 MMHG | RESPIRATION RATE: 16 BRPM

## 2019-09-16 DIAGNOSIS — Z12.31 VISIT FOR SCREENING MAMMOGRAM: ICD-10-CM

## 2019-09-16 DIAGNOSIS — E03.8 SUBCLINICAL HYPOTHYROIDISM: ICD-10-CM

## 2019-09-16 DIAGNOSIS — I65.21 ATHEROSCLEROSIS OF RIGHT CAROTID ARTERY: ICD-10-CM

## 2019-09-16 DIAGNOSIS — Z00.00 PE (PHYSICAL EXAM), ANNUAL: Primary | ICD-10-CM

## 2019-09-16 PROCEDURE — 99396 PREV VISIT EST AGE 40-64: CPT | Performed by: INTERNAL MEDICINE

## 2019-09-16 NOTE — PROGRESS NOTES
Patient presents with:  Physical: rm 9 DO: annual, lab work completed, 0 depression, non-drinker - No Student   Imm/Inj: defer flu vaccine      HPI:  Here for cpe. Review of Systems   Constitutional: Negative for fever, chills and fatigue. No distress. left   • Bloating    • Breast calcifications    • Breast nodule 10/7/2009    right, suspicious for malignancy but finding does not exhibit classic findings of breast cancer-s/p bx 10/09   • Cervical radiculopathy 10/9/2007   • Cervical strain 3/22/2007 9/5/2002    3 discrete uterine fibroids   • Weight gain      Past Surgical History:   Procedure Laterality Date   • [address], 3/17/2008, 10/16/2009    Wire localization bx of L breast (2008), local R breast bx, benign (1999), Allergies    Allergy                     Comment:METAL  Amoxicillin Trihydr*    PAIN    Comment:Stomach pain  Celecoxib               PAIN    Comment:Stomach pain and agitation  Potassium Clavulana*    PAIN    Comment:Stomach pain  Vioxx [Rofecoxib] moist.   Eyes: Conjunctivae and EOM are normal. PERRLA. No scleral icterus. Neck: Normal range of motion. Neck supple. Normal carotid pulses and no JVD present. No edema present. No mass and no thyromegaly present.    Cardiovascular: Normal rate, regular

## 2019-09-17 ENCOUNTER — HOSPITAL ENCOUNTER (OUTPATIENT)
Dept: PHYSICAL THERAPY | Facility: HOSPITAL | Age: 64
Setting detail: THERAPIES SERIES
Discharge: HOME | End: 2019-09-17
Attending: INTERNAL MEDICINE
Payer: COMMERCIAL

## 2019-09-17 NOTE — PROGRESS NOTES
Dx: TMJ (temporomandibular joint disorder) (M26.609)            Insurance (Authorized # of Visits):  6           Authorizing Physician: Dr. Georgi Peacock  Next MD visit: none scheduled  Fall Risk: standard         Precautions: n/a             Subjective: Headache reps each  foam beam super 6 x10 reps each  Prone over sb 2#   Rows  Scaption   Superman   X20    Foam beam super 6 x10 reps  Foam beam super 6 x10 reps    st to UT, levators, SCM bilaterally x18'  Masseters, temporalis, digastrics x4' st to UT, levators,

## 2019-09-19 ENCOUNTER — HOSPITAL ENCOUNTER (OUTPATIENT)
Dept: PHYSICAL THERAPY | Facility: HOSPITAL | Age: 64
Setting detail: THERAPIES SERIES
Discharge: HOME | End: 2019-09-19
Attending: INTERNAL MEDICINE
Payer: COMMERCIAL

## 2019-09-19 PROCEDURE — 97140 MANUAL THERAPY 1/> REGIONS: CPT | Performed by: PHYSICAL THERAPIST

## 2019-09-19 NOTE — PROGRESS NOTES
Dx: TMJ (temporomandibular joint disorder) (M26.609)            Insurance (Authorized # of Visits):  6           Authorizing Physician: Dr. Jerrell Hines MD visit: none scheduled  Fall Risk: standard         Precautions: n/a             Subjective: Had a re (2,2) UBE x4' (2,2) UBE x4' (2,2) UBE x4' (2,2) UBE x4' (2,2)     foam beam super 6 x10 reps each  foam beam super 6 x10 reps each  Prone over sb 2#   Rows  Scaption   Superman   X20    Foam beam super 6 x10 reps  Foam beam super 6 x10 reps  [facility] to [facility]

## 2019-09-24 ENCOUNTER — HOSPITAL ENCOUNTER (OUTPATIENT)
Dept: PHYSICAL THERAPY | Facility: HOSPITAL | Age: 64
Setting detail: THERAPIES SERIES
Discharge: HOME | End: 2019-09-24
Attending: INTERNAL MEDICINE
Payer: COMMERCIAL

## 2019-09-24 PROCEDURE — 97140 MANUAL THERAPY 1/> REGIONS: CPT | Performed by: PHYSICAL THERAPIST

## 2019-09-24 PROCEDURE — 97110 THERAPEUTIC EXERCISES: CPT | Performed by: PHYSICAL THERAPIST

## 2019-09-25 NOTE — PROGRESS NOTES
Dx: TMJ (temporomandibular joint disorder) (M26.609)            Insurance (Authorized # of Visits):  6           Authorizing Physician: Dr. Mary Hines MD visit: none scheduled  Fall Risk: standard         Precautions: n/a             Subjective: Have loulou foam beam super 6 x10 reps each  Prone over sb 2#   Rows  Scaption   Superman   X20    Foam beam super 6 x10 reps  Foam beam super 6 x10 reps  stm to masseters, temporalis, origin of SCM bilaterally x32 '  Suboccipitals, scalenes  stm to masseters, tempora

## 2019-09-30 ENCOUNTER — HOSPITAL ENCOUNTER (OUTPATIENT)
Dept: PHYSICAL THERAPY | Facility: HOSPITAL | Age: 64
Setting detail: THERAPIES SERIES
Discharge: HOME | End: 2019-09-30
Attending: INTERNAL MEDICINE
Payer: COMMERCIAL

## 2019-09-30 PROCEDURE — 97140 MANUAL THERAPY 1/> REGIONS: CPT | Performed by: PHYSICAL THERAPIST

## 2019-09-30 PROCEDURE — 97110 THERAPEUTIC EXERCISES: CPT | Performed by: PHYSICAL THERAPIST

## 2019-09-30 NOTE — PROGRESS NOTES
Dx: TMJ (temporomandibular joint disorder) (M26.609)            Insurance (Authorized # of Visits):  6           Authorizing Physician: Dr. Gerber Gomez  Next MD visit: none scheduled  Fall Risk: standard         Precautions: n/a             Subjective: My jaw h foam beam super 6 x10 reps each  foam beam super 6 x10 reps each  Prone over sb 2#   Rows  Scaption   Superman   X20    Foam beam super 6 x10 reps  Foam beam super 6 x10 reps  stm to masseters, temporalis, origin of SCM bilaterally x32 '  Suboccipitals,

## 2019-10-01 ENCOUNTER — HOSPITAL ENCOUNTER (OUTPATIENT)
Dept: ULTRASOUND IMAGING | Facility: HOSPITAL | Age: 64
Discharge: HOME | End: 2019-10-01
Attending: INTERNAL MEDICINE
Payer: COMMERCIAL

## 2019-10-01 DIAGNOSIS — I65.21 ATHEROSCLEROSIS OF RIGHT CAROTID ARTERY: ICD-10-CM

## 2019-10-01 PROCEDURE — 93880 EXTRACRANIAL BILAT STUDY: CPT | Performed by: INTERNAL MEDICINE

## 2019-10-07 ENCOUNTER — HOSPITAL ENCOUNTER (OUTPATIENT)
Dept: PHYSICAL THERAPY | Facility: HOSPITAL | Age: 64
Setting detail: THERAPIES SERIES
Discharge: HOME | End: 2019-10-07
Attending: INTERNAL MEDICINE
Payer: COMMERCIAL

## 2019-10-10 ENCOUNTER — APPOINTMENT (OUTPATIENT)
Dept: PHYSICAL THERAPY | Facility: HOSPITAL | Age: 64
End: 2019-10-10
Attending: INTERNAL MEDICINE
Payer: COMMERCIAL

## 2019-10-17 ENCOUNTER — APPOINTMENT (OUTPATIENT)
Dept: PHYSICAL THERAPY | Facility: HOSPITAL | Age: 64
End: 2019-10-17
Attending: INTERNAL MEDICINE
Payer: COMMERCIAL

## 2019-10-17 RX ORDER — LANCETS
EACH MISCELLANEOUS
Refills: 0 | OUTPATIENT
Start: 2019-10-17

## 2019-10-21 RX ORDER — LANCETS
EACH MISCELLANEOUS
Qty: 100 EACH | Refills: 0 | Status: SHIPPED | OUTPATIENT
Start: 2019-10-21 | End: 2019-12-03

## 2019-10-24 RX ORDER — BLOOD SUGAR DIAGNOSTIC
STRIP MISCELLANEOUS
Qty: 200 EACH | Refills: 1 | Status: SHIPPED | OUTPATIENT
Start: 2019-10-24 | End: 2020-05-13

## 2019-11-08 ENCOUNTER — HOSPITAL ENCOUNTER (OUTPATIENT)
Dept: MAMMOGRAPHY | Age: 64
Discharge: HOME | End: 2019-11-08
Attending: INTERNAL MEDICINE
Payer: COMMERCIAL

## 2019-11-08 DIAGNOSIS — Z12.31 VISIT FOR SCREENING MAMMOGRAM: ICD-10-CM

## 2019-11-08 PROCEDURE — 77063 BREAST TOMOSYNTHESIS BI: CPT | Performed by: INTERNAL MEDICINE

## 2019-11-08 PROCEDURE — 77067 SCR MAMMO BI INCL CAD: CPT | Performed by: INTERNAL MEDICINE

## 2019-12-03 RX ORDER — LANCETS
EACH MISCELLANEOUS
Qty: 200 EACH | Refills: 2 | Status: SHIPPED | OUTPATIENT
Start: 2019-12-03 | End: 2020-05-13

## 2019-12-04 ENCOUNTER — APPOINTMENT (OUTPATIENT)
Dept: LAB | Age: 64
End: 2019-12-04
Attending: INTERNAL MEDICINE
Payer: COMMERCIAL

## 2019-12-04 ENCOUNTER — TELEPHONE (OUTPATIENT)
Dept: INTERNAL MEDICINE CLINIC | Facility: CLINIC | Age: 64
End: 2019-12-04

## 2019-12-04 DIAGNOSIS — R76.8 RHEUMATOID FACTOR POSITIVE: ICD-10-CM

## 2019-12-04 DIAGNOSIS — M35.00 SICCA SYNDROME (HCC): ICD-10-CM

## 2019-12-04 DIAGNOSIS — Z13.29 SCREENING FOR ENDOCRINE, METABOLIC AND IMMUNITY DISORDER: Primary | ICD-10-CM

## 2019-12-04 DIAGNOSIS — Z13.0 SCREENING FOR ENDOCRINE, METABOLIC AND IMMUNITY DISORDER: ICD-10-CM

## 2019-12-04 DIAGNOSIS — Z13.228 SCREENING FOR ENDOCRINE, METABOLIC AND IMMUNITY DISORDER: Primary | ICD-10-CM

## 2019-12-04 DIAGNOSIS — Z13.29 SCREENING FOR ENDOCRINE, METABOLIC AND IMMUNITY DISORDER: ICD-10-CM

## 2019-12-04 DIAGNOSIS — Z13.0 SCREENING FOR ENDOCRINE, METABOLIC AND IMMUNITY DISORDER: Primary | ICD-10-CM

## 2019-12-04 DIAGNOSIS — Z13.228 SCREENING FOR ENDOCRINE, METABOLIC AND IMMUNITY DISORDER: ICD-10-CM

## 2019-12-04 PROCEDURE — 86431 RHEUMATOID FACTOR QUANT: CPT | Performed by: INTERNAL MEDICINE

## 2019-12-04 PROCEDURE — 86200 CCP ANTIBODY: CPT | Performed by: INTERNAL MEDICINE

## 2019-12-04 PROCEDURE — 86140 C-REACTIVE PROTEIN: CPT | Performed by: INTERNAL MEDICINE

## 2019-12-04 PROCEDURE — 86235 NUCLEAR ANTIGEN ANTIBODY: CPT | Performed by: INTERNAL MEDICINE

## 2019-12-04 PROCEDURE — 85652 RBC SED RATE AUTOMATED: CPT | Performed by: INTERNAL MEDICINE

## 2019-12-04 PROCEDURE — 86706 HEP B SURFACE ANTIBODY: CPT | Performed by: INTERNAL MEDICINE

## 2019-12-06 NOTE — TELEPHONE ENCOUNTER
Hepatitis Bs AB added on to existing blood in the lab. Hold for results. LM for pt that we added on a titer for Hepatitis to existing blood in the lab, awaiting results.

## 2019-12-10 NOTE — TELEPHONE ENCOUNTER
Left a detailed message for patient that she is immune to hep b and does not need more shots for this. Asked to call office if she had any further questions.

## 2019-12-11 ENCOUNTER — TELEPHONE (OUTPATIENT)
Dept: RHEUMATOLOGY | Facility: CLINIC | Age: 64
End: 2019-12-11

## 2019-12-11 ENCOUNTER — HOSPITAL ENCOUNTER (OUTPATIENT)
Dept: GENERAL RADIOLOGY | Age: 64
Discharge: HOME | End: 2019-12-11
Attending: INTERNAL MEDICINE
Payer: COMMERCIAL

## 2019-12-11 ENCOUNTER — OFFICE VISIT (OUTPATIENT)
Dept: RHEUMATOLOGY | Facility: CLINIC | Age: 64
End: 2019-12-11
Payer: COMMERCIAL

## 2019-12-11 VITALS
DIASTOLIC BLOOD PRESSURE: 68 MMHG | SYSTOLIC BLOOD PRESSURE: 110 MMHG | WEIGHT: 130 LBS | TEMPERATURE: 98 F | BODY MASS INDEX: 22 KG/M2 | HEART RATE: 68 BPM | RESPIRATION RATE: 12 BRPM

## 2019-12-11 DIAGNOSIS — R76.8 RHEUMATOID FACTOR POSITIVE: ICD-10-CM

## 2019-12-11 DIAGNOSIS — M19.041 PRIMARY OSTEOARTHRITIS OF BOTH HANDS: ICD-10-CM

## 2019-12-11 DIAGNOSIS — D70.9 NEUTROPENIA, UNSPECIFIED TYPE (HCC): ICD-10-CM

## 2019-12-11 DIAGNOSIS — M26.609 TMJ (TEMPOROMANDIBULAR JOINT DISORDER): ICD-10-CM

## 2019-12-11 DIAGNOSIS — R76.8 RHEUMATOID FACTOR POSITIVE: Primary | ICD-10-CM

## 2019-12-11 DIAGNOSIS — M19.042 PRIMARY OSTEOARTHRITIS OF BOTH HANDS: ICD-10-CM

## 2019-12-11 DIAGNOSIS — M35.00 SICCA SYNDROME (HCC): ICD-10-CM

## 2019-12-11 PROCEDURE — 99214 OFFICE O/P EST MOD 30 MIN: CPT | Performed by: INTERNAL MEDICINE

## 2019-12-11 NOTE — TELEPHONE ENCOUNTER
Phoned pt, explained to pt that dr. Ealna Solomon feels it is safe to have xrays taken, but pt would like to try PT first and see if pain continues. Dr. Elana Solomon aware.
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
## 2019-12-11 NOTE — TELEPHONE ENCOUNTER
Pt at radiology for xray for tmj, states they will need to take 6 to 8 images. Dr. Jerrell Lee states she would like 4views. Radiology states it is standard. Pt refuses at this time. Would like Dr. Jerrell Lee to call her.

## 2019-12-11 NOTE — TELEPHONE ENCOUNTER
Called and spoke with pt. Pt states 'would like to know if taking so many pictures is safe? Was told would receive 6-8 pictures. Doesn't have pain every day. Did PT in the past for tmj, had improvement. Will continue with PT and will see if needs the xray.

## 2019-12-11 NOTE — PATIENT INSTRUCTIONS
-- get xrays of jaw bone to evaluate for osteoarthritis (wear and tear) vs rheumatoid arthritis; I will call with test results  -- you still have RF positivity, so be on the look out for other signs of joint pain/swelling or stiffness in the mornings   --

## 2019-12-18 ENCOUNTER — APPOINTMENT (OUTPATIENT)
Dept: PHYSICAL THERAPY | Facility: HOSPITAL | Age: 64
End: 2019-12-18
Attending: INTERNAL MEDICINE
Payer: COMMERCIAL

## 2020-01-06 ENCOUNTER — APPOINTMENT (OUTPATIENT)
Dept: PHYSICAL THERAPY | Facility: HOSPITAL | Age: 65
End: 2020-01-06
Attending: INTERNAL MEDICINE
Payer: COMMERCIAL

## 2020-01-09 ENCOUNTER — APPOINTMENT (OUTPATIENT)
Dept: PHYSICAL THERAPY | Facility: HOSPITAL | Age: 65
End: 2020-01-09
Attending: INTERNAL MEDICINE
Payer: COMMERCIAL

## 2020-01-13 ENCOUNTER — APPOINTMENT (OUTPATIENT)
Dept: PHYSICAL THERAPY | Facility: HOSPITAL | Age: 65
End: 2020-01-13
Attending: INTERNAL MEDICINE
Payer: COMMERCIAL

## 2020-01-16 ENCOUNTER — APPOINTMENT (OUTPATIENT)
Dept: PHYSICAL THERAPY | Facility: HOSPITAL | Age: 65
End: 2020-01-16
Attending: INTERNAL MEDICINE
Payer: COMMERCIAL

## 2020-01-20 ENCOUNTER — APPOINTMENT (OUTPATIENT)
Dept: PHYSICAL THERAPY | Facility: HOSPITAL | Age: 65
End: 2020-01-20
Attending: INTERNAL MEDICINE
Payer: COMMERCIAL

## 2020-01-23 ENCOUNTER — APPOINTMENT (OUTPATIENT)
Dept: PHYSICAL THERAPY | Facility: HOSPITAL | Age: 65
End: 2020-01-23
Attending: INTERNAL MEDICINE
Payer: COMMERCIAL

## 2020-02-05 ENCOUNTER — TELEPHONE (OUTPATIENT)
Dept: INTERNAL MEDICINE CLINIC | Facility: CLINIC | Age: 65
End: 2020-02-05

## 2020-02-05 DIAGNOSIS — Z23 NEED FOR VACCINATION AGAINST STREPTOCOCCUS PNEUMONIAE: Primary | ICD-10-CM

## 2020-02-05 NOTE — TELEPHONE ENCOUNTER
Pt stated she wants to get a pneumonia vaccine and doesn't know which one she should have. Please advise.

## 2020-02-06 NOTE — TELEPHONE ENCOUNTER
LOV 9/16/19 with [facility].     Immunizations/Injections    FLU VACC 4 AUBREY Split Virus 3 YR+ (72068)10/7/2016    FLUZONE 3 Yrs+ Quad Prsv Free 0.5 ml ([id number])9/1/2017    Fluarix 6 Months And Older 0.5 ml prefilled syringe (93130)10/1/2019    Flublok Quad Influenza

## 2020-02-07 NOTE — TELEPHONE ENCOUNTER
Patient notified Pneumovax at age 72 and Prevnar at age 77. Pt states the pharmacy told her there were new guidelines as of December 2019 for Pneumonia vaccines. Pt concerned because she will be traveling to [place] soon for a week.   Pt asking for

## 2020-02-10 NOTE — TELEPHONE ENCOUNTER
Please call pt back to let her know she can wait for the vaccine until her appt 3/16/20 with [facility]. Please add Pneumovax 23 to pt's appt notes. Thank you.

## 2020-02-10 NOTE — TELEPHONE ENCOUNTER
Pt has appt with [facility]   Future Appointments   Date Time Provider Doyle Angel   3/16/2020 10:30 AM Qing Villatoro MD [facility]     OK to wait or does she need to come in sooner for the vaccine?

## 2020-02-20 ENCOUNTER — OFFICE VISIT (OUTPATIENT)
Dept: PHYSICAL THERAPY | Age: 65
End: 2020-02-20
Attending: INTERNAL MEDICINE
Payer: COMMERCIAL

## 2020-02-20 DIAGNOSIS — M26.609 TMJ (TEMPOROMANDIBULAR JOINT DISORDER): ICD-10-CM

## 2020-02-20 PROCEDURE — 97162 PT EVAL MOD COMPLEX 30 MIN: CPT

## 2020-02-20 PROCEDURE — 97140 MANUAL THERAPY 1/> REGIONS: CPT

## 2020-02-20 PROCEDURE — 97110 THERAPEUTIC EXERCISES: CPT

## 2020-02-20 NOTE — PROGRESS NOTES
TMJ  EVALUATION:   Referring Physician: Dr. Clara Eduardo  Diagnosis: TMJ (temporomandibular joint disorder) (M26.609)  Date of Service: 2/20/2020     PATIENT Chela Caldera is a 59year old female who presents to therapy today with complaints of bilate pain. The results of the objective tests and measures show severe tenderness on mastoid processes and SCM, moderate tenderness on suboccipitals, upper traps, levator scap, minimal tenderness on masseters and temporalis, opening of mouth reproduced an audib Today’s Treatment and Response:   Pt education was provided on exam findings, treatment diagnosis, treatment plan, expectations, and prognosis.  Pt was also provided recommendations for activity modifications, possible soreness after evaluation, modalitie seen for 2 x/week or a total of 10 visits over a 90 day period.  Treatment will include: Manual Therapy, Neuromuscular Re-education, Therapeutic Exercise, Home Exercise Program instruction and Modalities to include: Ultrasound    Education or treatment Zunilda Adames

## 2020-02-24 ENCOUNTER — TELEPHONE (OUTPATIENT)
Dept: PHYSICAL THERAPY | Age: 65
End: 2020-02-24

## 2020-02-26 ENCOUNTER — APPOINTMENT (OUTPATIENT)
Dept: PHYSICAL THERAPY | Age: 65
End: 2020-02-26
Attending: INTERNAL MEDICINE
Payer: COMMERCIAL

## 2020-02-27 ENCOUNTER — OFFICE VISIT (OUTPATIENT)
Dept: PHYSICAL THERAPY | Age: 65
End: 2020-02-27
Attending: INTERNAL MEDICINE
Payer: COMMERCIAL

## 2020-02-27 PROCEDURE — 97110 THERAPEUTIC EXERCISES: CPT

## 2020-02-27 PROCEDURE — 97140 MANUAL THERAPY 1/> REGIONS: CPT

## 2020-02-27 NOTE — PROGRESS NOTES
Dx: TMJ (temporomandibular joint disorder) (M26.609)       Insurance (Authorized # of Visits):  n/a        Authorizing Physician: Dr. Georgi Hines MD visit: none scheduled  Fall Risk: standard         Precautions: n/a            Subjective: Patient states x 10  YB B shoulder ext 3 x 10  YB B shoulder ER at 0 deg abd 3 x 10         MT  STM upper traps, SCM, masseters, temporalis, suboccipitals x 15 min  Anterior glide L TMJ Grade II 30 sec x 3  Medial glide L TMJ Grade II 30 sec x 3       Modality  HP cervic

## 2020-03-03 ENCOUNTER — LAB ENCOUNTER (OUTPATIENT)
Dept: LAB | Age: 65
End: 2020-03-03
Attending: INTERNAL MEDICINE
Payer: COMMERCIAL

## 2020-03-03 DIAGNOSIS — D70.9 NEUTROPENIA, UNSPECIFIED TYPE (HCC): ICD-10-CM

## 2020-03-03 DIAGNOSIS — E03.8 SUBCLINICAL HYPOTHYROIDISM: ICD-10-CM

## 2020-03-03 DIAGNOSIS — R76.8 RHEUMATOID FACTOR POSITIVE: ICD-10-CM

## 2020-03-03 DIAGNOSIS — M26.609 TMJ (TEMPOROMANDIBULAR JOINT DISORDER): ICD-10-CM

## 2020-03-03 LAB
CRP SERPL-MCNC: <0.29 MG/DL (ref ?–0.3)
RHEUMATOID FACT SERPL-ACNC: 31 IU/ML (ref ?–15)
SED RATE-ML: 7 MM/HR (ref 0–25)
T4 FREE SERPL-MCNC: 1 NG/DL (ref 0.8–1.7)
TSI SER-ACNC: 4.84 MIU/ML (ref 0.36–3.74)

## 2020-03-03 PROCEDURE — 86334 IMMUNOFIX E-PHORESIS SERUM: CPT | Performed by: INTERNAL MEDICINE

## 2020-03-03 PROCEDURE — 86431 RHEUMATOID FACTOR QUANT: CPT | Performed by: INTERNAL MEDICINE

## 2020-03-03 PROCEDURE — 86140 C-REACTIVE PROTEIN: CPT | Performed by: INTERNAL MEDICINE

## 2020-03-03 PROCEDURE — 85652 RBC SED RATE AUTOMATED: CPT | Performed by: INTERNAL MEDICINE

## 2020-03-03 PROCEDURE — 83520 IMMUNOASSAY QUANT NOS NONAB: CPT | Performed by: INTERNAL MEDICINE

## 2020-03-03 PROCEDURE — 86200 CCP ANTIBODY: CPT | Performed by: INTERNAL MEDICINE

## 2020-03-03 PROCEDURE — 84165 PROTEIN E-PHORESIS SERUM: CPT | Performed by: INTERNAL MEDICINE

## 2020-03-03 PROCEDURE — 84443 ASSAY THYROID STIM HORMONE: CPT | Performed by: INTERNAL MEDICINE

## 2020-03-03 PROCEDURE — 36415 COLL VENOUS BLD VENIPUNCTURE: CPT | Performed by: INTERNAL MEDICINE

## 2020-03-03 PROCEDURE — 84439 ASSAY OF FREE THYROXINE: CPT | Performed by: INTERNAL MEDICINE

## 2020-03-05 ENCOUNTER — OFFICE VISIT (OUTPATIENT)
Dept: PHYSICAL THERAPY | Age: 65
End: 2020-03-05
Attending: INTERNAL MEDICINE
Payer: COMMERCIAL

## 2020-03-05 DIAGNOSIS — M26.609 TMJ (TEMPOROMANDIBULAR JOINT DISORDER): ICD-10-CM

## 2020-03-05 LAB — CCP IGG SERPL-ACNC: 1 U/ML (ref 0–6.9)

## 2020-03-05 PROCEDURE — 97140 MANUAL THERAPY 1/> REGIONS: CPT

## 2020-03-05 PROCEDURE — 97110 THERAPEUTIC EXERCISES: CPT

## 2020-03-05 NOTE — PROGRESS NOTES
Dx: TMJ (temporomandibular joint disorder) (M26.609)       Insurance (Authorized # of Visits):  n/a        Authorizing Physician: Dr. Shelbi Niño  Next MD visit: none scheduled  Fall Risk: standard         Precautions: n/a            Subjective: Patient states TX#: 5/ Date:    Tx#: 6/   TE  Upper cycle 2'F 2'B (subj info)  YB scapular retraction 3 x 10  YB B shoulder ext 3 x 10  YB B shoulder ER at 0 deg abd 3 x 10   TE  Upper cycle 3'F 3'B (subj info)  Mint green sports cord upright rows 2 x 10  Mint spo

## 2020-03-07 LAB
KAPPA QNT FREE LIGHT CHAINS: 29.31 MG/L
KAPPA/LAMBDA FREE LIGHT CHAIN RATIO: 1.3
LAMBDA QNT FREE LIGHT CHAINS: 22.48 MG/L

## 2020-03-09 ENCOUNTER — OFFICE VISIT (OUTPATIENT)
Dept: PHYSICAL THERAPY | Age: 65
End: 2020-03-09
Attending: INTERNAL MEDICINE
Payer: COMMERCIAL

## 2020-03-09 DIAGNOSIS — M26.609 TMJ (TEMPOROMANDIBULAR JOINT DISORDER): ICD-10-CM

## 2020-03-09 PROCEDURE — 97110 THERAPEUTIC EXERCISES: CPT

## 2020-03-09 PROCEDURE — 97140 MANUAL THERAPY 1/> REGIONS: CPT

## 2020-03-09 NOTE — PROGRESS NOTES
Dx: TMJ (temporomandibular joint disorder) (M26.609)       Insurance (Authorized # of Visits):  n/a        Authorizing Physician: Dr. Clau Dave  Next MD visit: none scheduled  Fall Risk: standard         Precautions: n/a            Subjective: Patient states table or pushing a vacuum  - In progress  · Pt will be independent and compliant with comprehensive HEP to maintain progress achieved in PT - In progress    Plan: Continue physical therapy to address above goals.   Date: 2/27/2020  TX#: 2/ Date: 3/5/

## 2020-03-10 ENCOUNTER — OFFICE VISIT (OUTPATIENT)
Dept: RHEUMATOLOGY | Facility: CLINIC | Age: 65
End: 2020-03-10
Payer: COMMERCIAL

## 2020-03-10 ENCOUNTER — APPOINTMENT (OUTPATIENT)
Dept: LAB | Age: 65
End: 2020-03-10
Attending: INTERNAL MEDICINE
Payer: COMMERCIAL

## 2020-03-10 VITALS
DIASTOLIC BLOOD PRESSURE: 76 MMHG | SYSTOLIC BLOOD PRESSURE: 118 MMHG | TEMPERATURE: 98 F | WEIGHT: 128 LBS | BODY MASS INDEX: 21.85 KG/M2 | HEIGHT: 64 IN | HEART RATE: 75 BPM | RESPIRATION RATE: 16 BRPM | OXYGEN SATURATION: 97 %

## 2020-03-10 DIAGNOSIS — R17 YELLOW SKIN: ICD-10-CM

## 2020-03-10 DIAGNOSIS — M26.609 TMJ (TEMPOROMANDIBULAR JOINT DISORDER): ICD-10-CM

## 2020-03-10 DIAGNOSIS — M19.041 PRIMARY OSTEOARTHRITIS OF BOTH HANDS: ICD-10-CM

## 2020-03-10 DIAGNOSIS — R53.83 FATIGUE, UNSPECIFIED TYPE: ICD-10-CM

## 2020-03-10 DIAGNOSIS — M19.042 PRIMARY OSTEOARTHRITIS OF BOTH HANDS: ICD-10-CM

## 2020-03-10 DIAGNOSIS — M35.00 SICCA SYNDROME (HCC): Primary | ICD-10-CM

## 2020-03-10 DIAGNOSIS — R76.8 RHEUMATOID FACTOR POSITIVE: ICD-10-CM

## 2020-03-10 LAB
ALBUMIN SERPL ELPH-MCNC: 4.55 G/DL (ref 3.75–5.21)
ALBUMIN SERPL-MCNC: 4.1 G/DL (ref 3.4–5)
ALBUMIN/GLOB SERPL: 1.1 {RATIO} (ref 1–2)
ALBUMIN/GLOB SERPL: 1.65 {RATIO} (ref 1–2)
ALP LIVER SERPL-CCNC: 72 U/L (ref 50–130)
ALPHA1 GLOB SERPL ELPH-MCNC: 0.27 G/DL (ref 0.19–0.46)
ALPHA2 GLOB SERPL ELPH-MCNC: 0.6 G/DL (ref 0.48–1.05)
ALT SERPL-CCNC: 26 U/L (ref 13–56)
ANION GAP SERPL CALC-SCNC: 5 MMOL/L (ref 0–18)
AST SERPL-CCNC: 21 U/L (ref 15–37)
B-GLOBULIN SERPL ELPH-MCNC: 0.67 G/DL (ref 0.68–1.23)
BILIRUB SERPL-MCNC: 0.6 MG/DL (ref 0.1–2)
BUN BLD-MCNC: 19 MG/DL (ref 7–18)
BUN/CREAT SERPL: 29.7 (ref 10–20)
CALCIUM BLD-MCNC: 9.7 MG/DL (ref 8.5–10.1)
CHLORIDE SERPL-SCNC: 104 MMOL/L (ref 98–112)
CO2 SERPL-SCNC: 28 MMOL/L (ref 21–32)
CREAT BLD-MCNC: 0.64 MG/DL (ref 0.55–1.02)
GAMMA GLOB SERPL ELPH-MCNC: 1.21 G/DL (ref 0.62–1.7)
GLOBULIN PLAS-MCNC: 3.7 G/DL (ref 2.8–4.4)
GLUCOSE BLD-MCNC: 92 MG/DL (ref 70–99)
M PROTEIN MFR SERPL ELPH: 7.3 G/DL (ref 6.4–8.2)
M PROTEIN MFR SERPL ELPH: 7.8 G/DL (ref 6.4–8.2)
OSMOLALITY SERPL CALC.SUM OF ELEC: 286 MOSM/KG (ref 275–295)
PATIENT FASTING Y/N/NP: NO
POTASSIUM SERPL-SCNC: 3.9 MMOL/L (ref 3.5–5.1)
SODIUM SERPL-SCNC: 137 MMOL/L (ref 136–145)

## 2020-03-10 PROCEDURE — 80053 COMPREHEN METABOLIC PANEL: CPT | Performed by: INTERNAL MEDICINE

## 2020-03-10 PROCEDURE — 99214 OFFICE O/P EST MOD 30 MIN: CPT | Performed by: INTERNAL MEDICINE

## 2020-03-10 NOTE — PROGRESS NOTES
RHEUMATOLOGY Follow up   Date of visit: 03/10/20  ? Patient presents with: Follow - Up: 3 month f/u    ?   ASSESSMENT, DISCUSSION & PLAN   Assessment:  Sicca syndrome ([address]  (primary encounter diagnosis)  Yellow skin  Rheumatoid factor positive  Primary the bilateral TMJ. She has been going through physical therapy for this. Has done about 4 sessions and has 10 sessions planned. Does admit to still needing to do some diet modifications which can worsen her TMJ.      Denies any other significant joint pain, Restasis/Xiidra. Did not require punctal plugs. Denies recurrent cavities or swelling of the cheeks or under the jawbone.  States her dentist did not notice any changes of dry mouth/lack of saliva.      Does have arthritis/plantar fasciitis in her foot, s Abnormal menses    • Abscess, groin     R inguinal region   • Acute gastroenteritis 4/15/2004   • Anemia    • Arthritis     mild, joint   • Atypical ductal hyperplasia of breast 7/13/2011   • Atypical ductal hyperplasia of breast 3/17/2008    left   • Bloa 3/6/2012   • Type II or unspecified type diabetes mellitus without mention of complication, not stated as uncontrolled    • Unspecified essential hypertension    • URI (upper respiratory infection)    • Urinary retention    • Uterine fibroid 9/5/2002    3 by mouth 2 (two) times daily. , Disp: , Rfl:   Calcium 500 MG Oral Tab, Take 1,000 mg by mouth 2 (two) times daily. , Disp: , Rfl:       Modified Medications    No medications on file     There are no discontinued medications. ?  ?  Allergies:     Allergy is oriented to person, place, and time. She appears well-developed and well-nourished. No distress. HENT:   Head: Normocephalic.    Right Ear: External ear normal.   Left Ear: External ear normal.   Nose: Nose normal.   Mouth/Throat: Oropharynx is clear a LYPERCENT 42.1 09/06/2019    MOPERCENT 5.5 09/06/2019    EOPERCENT 0.5 09/06/2019    BAPERCENT 0.8 09/06/2019    NE 1.94 09/06/2019    LYMABS 1.60 09/06/2019    MOABSO 0.21 09/06/2019    EOABSO 0.02 09/06/2019    BAABSO 0.03 09/06/2019     Lab Results   Co

## 2020-03-11 ENCOUNTER — OFFICE VISIT (OUTPATIENT)
Dept: PHYSICAL THERAPY | Age: 65
End: 2020-03-11
Attending: INTERNAL MEDICINE
Payer: COMMERCIAL

## 2020-03-11 DIAGNOSIS — M26.609 TMJ (TEMPOROMANDIBULAR JOINT DISORDER): ICD-10-CM

## 2020-03-11 PROCEDURE — 97112 NEUROMUSCULAR REEDUCATION: CPT

## 2020-03-11 PROCEDURE — 97140 MANUAL THERAPY 1/> REGIONS: CPT

## 2020-03-11 PROCEDURE — 97110 THERAPEUTIC EXERCISES: CPT

## 2020-03-11 NOTE — PROGRESS NOTES
Dx: TMJ (temporomandibular joint disorder) (M26.609)       Insurance (Authorized # of Visits):  n/a        Authorizing Physician: Dr. Shelbi Niño  Next MD visit: none scheduled  Fall Risk: standard         Precautions: n/a            Subjective: Patient states TE  Upper cycle 2'F 2'B (subj info)  YB scapular retraction 3 x 10  YB B shoulder ext 3 x 10  YB B shoulder ER at 0 deg abd 3 x 10   TE  Upper cycle 3'F 3'B (subj info)  Mint green sports cord upright rows 2 x 10  Mint sports cord B shoulder ext 2 x 10

## 2020-03-12 ENCOUNTER — TELEPHONE (OUTPATIENT)
Dept: RHEUMATOLOGY | Facility: CLINIC | Age: 65
End: 2020-03-12

## 2020-03-13 ENCOUNTER — TELEPHONE (OUTPATIENT)
Dept: INTERNAL MEDICINE CLINIC | Facility: CLINIC | Age: 65
End: 2020-03-13

## 2020-03-13 NOTE — TELEPHONE ENCOUNTER
Pt called and wants to know if its OK that she r/s her appt for Monday that is to f/u on test results     Please advise     reviwed test with her, everything is reassuring, seen by her rheum. Pt can f/u in 3 mos. With me pt is aware.

## 2020-03-16 ENCOUNTER — TELEPHONE (OUTPATIENT)
Dept: PHYSICAL THERAPY | Age: 65
End: 2020-03-16

## 2020-03-16 ENCOUNTER — APPOINTMENT (OUTPATIENT)
Dept: PHYSICAL THERAPY | Age: 65
End: 2020-03-16
Attending: INTERNAL MEDICINE
Payer: COMMERCIAL

## 2020-03-18 ENCOUNTER — APPOINTMENT (OUTPATIENT)
Dept: PHYSICAL THERAPY | Age: 65
End: 2020-03-18
Attending: INTERNAL MEDICINE
Payer: COMMERCIAL

## 2020-03-18 ENCOUNTER — TELEPHONE (OUTPATIENT)
Dept: RHEUMATOLOGY | Facility: CLINIC | Age: 65
End: 2020-03-18

## 2020-03-23 ENCOUNTER — APPOINTMENT (OUTPATIENT)
Dept: PHYSICAL THERAPY | Age: 65
End: 2020-03-23
Attending: INTERNAL MEDICINE
Payer: COMMERCIAL

## 2020-03-25 ENCOUNTER — APPOINTMENT (OUTPATIENT)
Dept: PHYSICAL THERAPY | Age: 65
End: 2020-03-25
Attending: INTERNAL MEDICINE
Payer: COMMERCIAL

## 2020-03-26 ENCOUNTER — TELEPHONE (OUTPATIENT)
Dept: PHYSICAL THERAPY | Age: 65
End: 2020-03-26

## 2020-03-26 NOTE — TELEPHONE ENCOUNTER
Contacted pt to discuss department's initiative to protect all non-essential and high risk patients from COVID-19 exposure.  Discussed recommendations relative to pt's home program and use of heating pad for 15-20 minutes to help relax TMJ musculature and m

## 2020-03-30 ENCOUNTER — APPOINTMENT (OUTPATIENT)
Dept: PHYSICAL THERAPY | Age: 65
End: 2020-03-30
Attending: INTERNAL MEDICINE
Payer: COMMERCIAL

## 2020-04-02 ENCOUNTER — APPOINTMENT (OUTPATIENT)
Dept: PHYSICAL THERAPY | Age: 65
End: 2020-04-02
Attending: INTERNAL MEDICINE

## 2020-04-06 ENCOUNTER — TELEPHONE (OUTPATIENT)
Dept: PHYSICAL THERAPY | Age: 65
End: 2020-04-06

## 2020-04-08 ENCOUNTER — APPOINTMENT (OUTPATIENT)
Dept: PHYSICAL THERAPY | Age: 65
End: 2020-04-08

## 2020-04-13 ENCOUNTER — APPOINTMENT (OUTPATIENT)
Dept: PHYSICAL THERAPY | Age: 65
End: 2020-04-13
Attending: INTERNAL MEDICINE

## 2020-04-15 ENCOUNTER — APPOINTMENT (OUTPATIENT)
Dept: PHYSICAL THERAPY | Age: 65
End: 2020-04-15
Attending: INTERNAL MEDICINE

## 2020-04-20 ENCOUNTER — APPOINTMENT (OUTPATIENT)
Dept: PHYSICAL THERAPY | Age: 65
End: 2020-04-20

## 2020-05-11 ENCOUNTER — TELEPHONE (OUTPATIENT)
Dept: PHYSICAL THERAPY | Age: 65
End: 2020-05-11

## 2020-05-11 ENCOUNTER — APPOINTMENT (OUTPATIENT)
Dept: PHYSICAL THERAPY | Age: 65
End: 2020-05-11
Payer: MEDICARE

## 2020-05-13 ENCOUNTER — TELEPHONE (OUTPATIENT)
Dept: INTERNAL MEDICINE CLINIC | Facility: CLINIC | Age: 65
End: 2020-05-13

## 2020-05-13 ENCOUNTER — APPOINTMENT (OUTPATIENT)
Dept: PHYSICAL THERAPY | Age: 65
End: 2020-05-13
Attending: INTERNAL MEDICINE
Payer: MEDICARE

## 2020-05-13 RX ORDER — LANCETS
EACH MISCELLANEOUS
Qty: 200 EACH | Refills: 1 | Status: SHIPPED | OUTPATIENT
Start: 2020-05-13

## 2020-05-13 NOTE — TELEPHONE ENCOUNTER
Pharmacy needs new scripts that are Medicare compliant for diabetic testing supplies     Insulin use and diagnosis code need to be on the script

## 2020-05-15 ENCOUNTER — APPOINTMENT (OUTPATIENT)
Dept: PHYSICAL THERAPY | Age: 65
End: 2020-05-15
Attending: INTERNAL MEDICINE
Payer: MEDICARE

## 2020-05-18 ENCOUNTER — APPOINTMENT (OUTPATIENT)
Dept: PHYSICAL THERAPY | Age: 65
End: 2020-05-18
Attending: INTERNAL MEDICINE
Payer: MEDICARE

## 2020-05-18 ENCOUNTER — TELEPHONE (OUTPATIENT)
Dept: INTERNAL MEDICINE CLINIC | Facility: CLINIC | Age: 65
End: 2020-05-18

## 2020-05-18 NOTE — TELEPHONE ENCOUNTER
Pt stated she was changed to pre-diabetic and now the below medications aren't being covered by her insurance. Pt stated how can she control her diabetes without the medications.  Please advise    Microlet Lancets Does not apply Misc 200 each 1 5/13/2020

## 2020-05-18 NOTE — TELEPHONE ENCOUNTER
Paperwork for Bianca Izaguirre New Prescription Order for Diabetic Testing Supplies signed by AS and faxed to [place], confirmation received, paperwork sent to scan. Pt notified paperwork done for Medicare and should all be ok now.   Pt asked to call back if any cont

## 2020-05-18 NOTE — TELEPHONE ENCOUNTER
Spoke with pharmacist, notified of AS's response. Spoke with Vonnie, pt was upset. Notified pt that without official diagnosis of DM1 or DM2, medicare will not cover services.   Pt was told multiple times she could pay out of pocket for testing supplies

## 2020-05-18 NOTE — TELEPHONE ENCOUNTER
Spoke with pharmacist, states that DM supplies are not covered under \"pre-diabetic\", only under a \"diabetic\" diagnosis. FWD to AS, please advise if pt is pre diabetic or diabetic.

## 2020-05-18 NOTE — TELEPHONE ENCOUNTER
Aleksandr, Pharmacist [facility] at [employer] to say he needs clarification on some things regarding DM testing strips.     [phone]

## 2020-05-20 ENCOUNTER — APPOINTMENT (OUTPATIENT)
Dept: PHYSICAL THERAPY | Age: 65
End: 2020-05-20
Attending: INTERNAL MEDICINE
Payer: MEDICARE

## 2020-05-27 ENCOUNTER — APPOINTMENT (OUTPATIENT)
Dept: PHYSICAL THERAPY | Age: 65
End: 2020-05-27
Attending: INTERNAL MEDICINE
Payer: MEDICARE

## 2020-06-08 ENCOUNTER — APPOINTMENT (OUTPATIENT)
Dept: PHYSICAL THERAPY | Age: 65
End: 2020-06-08
Attending: INTERNAL MEDICINE
Payer: MEDICARE

## 2020-06-11 ENCOUNTER — APPOINTMENT (OUTPATIENT)
Dept: PHYSICAL THERAPY | Age: 65
End: 2020-06-11
Attending: INTERNAL MEDICINE
Payer: MEDICARE

## 2020-06-15 ENCOUNTER — APPOINTMENT (OUTPATIENT)
Dept: PHYSICAL THERAPY | Age: 65
End: 2020-06-15
Attending: INTERNAL MEDICINE
Payer: MEDICARE

## 2020-06-17 ENCOUNTER — APPOINTMENT (OUTPATIENT)
Dept: PHYSICAL THERAPY | Age: 65
End: 2020-06-17
Attending: INTERNAL MEDICINE
Payer: MEDICARE

## 2020-09-16 ENCOUNTER — OFFICE VISIT (OUTPATIENT)
Dept: INTERNAL MEDICINE CLINIC | Facility: CLINIC | Age: 65
End: 2020-09-16
Payer: MEDICARE

## 2020-09-16 VITALS
SYSTOLIC BLOOD PRESSURE: 134 MMHG | WEIGHT: 125 LBS | HEIGHT: 64 IN | TEMPERATURE: 98 F | HEART RATE: 80 BPM | DIASTOLIC BLOOD PRESSURE: 86 MMHG | BODY MASS INDEX: 21.34 KG/M2

## 2020-09-16 DIAGNOSIS — Z78.0 POST-MENOPAUSAL: ICD-10-CM

## 2020-09-16 DIAGNOSIS — M19.90 ARTHRITIS: ICD-10-CM

## 2020-09-16 DIAGNOSIS — R76.8 RHEUMATOID FACTOR POSITIVE: ICD-10-CM

## 2020-09-16 DIAGNOSIS — M19.041 PRIMARY OSTEOARTHRITIS OF BOTH HANDS: ICD-10-CM

## 2020-09-16 DIAGNOSIS — M85.89 OSTEOPENIA OF MULTIPLE SITES: ICD-10-CM

## 2020-09-16 DIAGNOSIS — D25.9 UTERINE LEIOMYOMA, UNSPECIFIED LOCATION: ICD-10-CM

## 2020-09-16 DIAGNOSIS — N63.0 BREAST NODULE: ICD-10-CM

## 2020-09-16 DIAGNOSIS — M19.042 PRIMARY OSTEOARTHRITIS OF BOTH HANDS: ICD-10-CM

## 2020-09-16 DIAGNOSIS — E78.2 MIXED HYPERLIPIDEMIA: ICD-10-CM

## 2020-09-16 DIAGNOSIS — R73.03 PRE-DIABETES: ICD-10-CM

## 2020-09-16 DIAGNOSIS — M35.00 SICCA SYNDROME (HCC): ICD-10-CM

## 2020-09-16 DIAGNOSIS — M26.629 TMJ SYNDROME: ICD-10-CM

## 2020-09-16 DIAGNOSIS — E03.8 SUBCLINICAL HYPOTHYROIDISM: ICD-10-CM

## 2020-09-16 DIAGNOSIS — R76.8 POSITIVE ANA (ANTINUCLEAR ANTIBODY): ICD-10-CM

## 2020-09-16 DIAGNOSIS — N60.99 ATYPICAL DUCTAL HYPERPLASIA OF BREAST: ICD-10-CM

## 2020-09-16 DIAGNOSIS — Z00.00 ENCOUNTER FOR ANNUAL HEALTH EXAMINATION: ICD-10-CM

## 2020-09-16 DIAGNOSIS — Z12.31 VISIT FOR SCREENING MAMMOGRAM: Primary | ICD-10-CM

## 2020-09-16 DIAGNOSIS — M48.061 LUMBAR FORAMINAL STENOSIS: ICD-10-CM

## 2020-09-16 PROCEDURE — 90732 PPSV23 VACC 2 YRS+ SUBQ/IM: CPT | Performed by: INTERNAL MEDICINE

## 2020-09-16 PROCEDURE — G0402 INITIAL PREVENTIVE EXAM: HCPCS | Performed by: INTERNAL MEDICINE

## 2020-09-16 PROCEDURE — G0009 ADMIN PNEUMOCOCCAL VACCINE: HCPCS | Performed by: INTERNAL MEDICINE

## 2020-09-16 NOTE — PROGRESS NOTES
HPI:   Desirae Alamo is a 72year old female who presents for a Medicare Initial Preventative Physical Exam (Welcome to Medicare- < 12 months on Medicare). Here for welcome to medicare.      Her last annual assessment has been over 1 year: Annual Physi Therapist (Physical Therapy)  Bandar Beyer, PT as Physical Therapist    Patient Active Problem List:     Mixed hyperlipidemia     Atypical ductal hyperplasia of breast     Lumbar foraminal stenosis     TMJ syndrome     Arthritis     Breast nodule     Uterine Anemia, Arthritis, Atypical ductal hyperplasia of breast (7/13/2011), Atypical ductal hyperplasia of breast (3/17/2008), Bloating, Breast calcifications, Breast nodule (10/7/2009), Cervical radiculopathy (10/9/2007), Cervical strain (3/22/2007), Chest pain Diabetes in her brother; Other in her father and mother. SOCIAL HISTORY:   She  reports that she has never smoked. She has never used smokeless tobacco. She reports that she does not drink alcohol or use drugs.      REVIEW OF SYSTEMS:   GENERAL: feels wel Throat: Lips, mucosa, and tongue normal; teeth and gums normal   Neck: Supple, symmetrical, trachea midline, no adenopathy;  thyroid: not enlarged, symmetric, no tenderness/mass/nodules; no carotid bruit or JVD   Back:   Symmetric, no curvature, ROM norm SCREENING BILAT (CPT=77067/11575); Future    Subclinical hypothyroidism  -     CBC WITH DIFFERENTIAL WITH PLATELET;  Future  -     TSH+FREE T4; Future  Stable continue observation, rpt labs now  Mixed hyperlipidemia  -     CBC WITH DIFFERENTIAL WITH PLATELE well-being?: Visiting Family; Social Interaction      This section provided for quick review of chart, separate sheet to patient  [address] Internal Lab or Procedure External Lab or Procedure   Diabetes Screening      H 10/1/2019 Please get every year    Pneumococcal 13 (Prevnar)  Covered Once after 65 No vaccine history found Please get once after your 65th birthday    Pneumococcal 23 (Pneumovax)  Covered Once after 65 No vaccine history found Please get once after your

## 2020-09-16 NOTE — PATIENT INSTRUCTIONS
Vonnie Colbert's SCREENING SCHEDULE   Tests on this list are recommended by your physician but may not be covered, or covered at this frequency, by your insurer. Please check with your insurance carrier before scheduling to verify coverage.    PREVENTATIVE one of the following criteria:   • Men who are 73-68 years old and have smoked more than 100 cigarettes in their lifetime   • Anyone with a family history    Colorectal Cancer Screening  Covered up to Age 76     Colonoscopy Screen   Covered every 10 years- Influenza  Covered Annually Orders placed or performed in visit on 10/09/13   • INFLUENZA VIRUS VACCINE, PRESERV FREE, >=1YEARS OF AGE   Orders placed or performed in visit on 10/15/12   • INFLUENZA VIRUS VACCINE, PRESERV FREE, >=1YEARS OF AGE    Please print using their home computer and printer. (the forms are also available in [address])  [email or web address]  This link also has information from the [facility] regarding Advance Directives.

## 2020-09-18 ENCOUNTER — LAB ENCOUNTER (OUTPATIENT)
Dept: LAB | Age: 65
End: 2020-09-18
Attending: INTERNAL MEDICINE
Payer: MEDICARE

## 2020-09-18 DIAGNOSIS — E78.2 MIXED HYPERLIPIDEMIA: ICD-10-CM

## 2020-09-18 DIAGNOSIS — E03.8 SUBCLINICAL HYPOTHYROIDISM: ICD-10-CM

## 2020-09-18 DIAGNOSIS — R73.03 PRE-DIABETES: ICD-10-CM

## 2020-09-18 LAB
ALBUMIN SERPL-MCNC: 4.3 G/DL (ref 3.4–5)
ALBUMIN/GLOB SERPL: 1.1 {RATIO} (ref 1–2)
ALP LIVER SERPL-CCNC: 86 U/L (ref 50–130)
ALT SERPL-CCNC: 30 U/L (ref 13–56)
ANION GAP SERPL CALC-SCNC: 3 MMOL/L (ref 0–18)
AST SERPL-CCNC: 21 U/L (ref 15–37)
BASOPHILS # BLD AUTO: 0.02 X10(3) UL (ref 0–0.2)
BASOPHILS NFR BLD AUTO: 0.4 %
BILIRUB SERPL-MCNC: 0.6 MG/DL (ref 0.1–2)
BUN BLD-MCNC: 18 MG/DL (ref 7–18)
BUN/CREAT SERPL: 26.1 (ref 10–20)
CALCIUM BLD-MCNC: 9.6 MG/DL (ref 8.5–10.1)
CHLORIDE SERPL-SCNC: 106 MMOL/L (ref 98–112)
CHOLEST SMN-MCNC: 210 MG/DL (ref ?–200)
CO2 SERPL-SCNC: 30 MMOL/L (ref 21–32)
CREAT BLD-MCNC: 0.69 MG/DL (ref 0.55–1.02)
DEPRECATED RDW RBC AUTO: 42.4 FL (ref 35.1–46.3)
EOSINOPHIL # BLD AUTO: 0.03 X10(3) UL (ref 0–0.7)
EOSINOPHIL NFR BLD AUTO: 0.6 %
ERYTHROCYTE [DISTWIDTH] IN BLOOD BY AUTOMATED COUNT: 13 % (ref 11–15)
EST. AVERAGE GLUCOSE BLD GHB EST-MCNC: 114 MG/DL (ref 68–126)
GLOBULIN PLAS-MCNC: 3.9 G/DL (ref 2.8–4.4)
GLUCOSE BLD-MCNC: 79 MG/DL (ref 70–99)
HBA1C MFR BLD HPLC: 5.6 % (ref ?–5.7)
HCT VFR BLD AUTO: 46.4 % (ref 35–48)
HDLC SERPL-MCNC: 90 MG/DL (ref 40–59)
HGB BLD-MCNC: 15.3 G/DL (ref 12–16)
IMM GRANULOCYTES # BLD AUTO: 0.01 X10(3) UL (ref 0–1)
IMM GRANULOCYTES NFR BLD: 0.2 %
LDLC SERPL CALC-MCNC: 108 MG/DL (ref ?–100)
LYMPHOCYTES # BLD AUTO: 1.95 X10(3) UL (ref 1–4)
LYMPHOCYTES NFR BLD AUTO: 37.3 %
M PROTEIN MFR SERPL ELPH: 8.2 G/DL (ref 6.4–8.2)
MCH RBC QN AUTO: 29.2 PG (ref 26–34)
MCHC RBC AUTO-ENTMCNC: 33 G/DL (ref 31–37)
MCV RBC AUTO: 88.5 FL (ref 80–100)
MONOCYTES # BLD AUTO: 0.28 X10(3) UL (ref 0.1–1)
MONOCYTES NFR BLD AUTO: 5.4 %
NEUTROPHILS # BLD AUTO: 2.94 X10 (3) UL (ref 1.5–7.7)
NEUTROPHILS # BLD AUTO: 2.94 X10(3) UL (ref 1.5–7.7)
NEUTROPHILS NFR BLD AUTO: 56.1 %
NONHDLC SERPL-MCNC: 120 MG/DL (ref ?–130)
OSMOLALITY SERPL CALC.SUM OF ELEC: 289 MOSM/KG (ref 275–295)
PATIENT FASTING Y/N/NP: YES
PATIENT FASTING Y/N/NP: YES
PLATELET # BLD AUTO: 149 10(3)UL (ref 150–450)
POTASSIUM SERPL-SCNC: 4.5 MMOL/L (ref 3.5–5.1)
RBC # BLD AUTO: 5.24 X10(6)UL (ref 3.8–5.3)
SODIUM SERPL-SCNC: 139 MMOL/L (ref 136–145)
T4 FREE SERPL-MCNC: 0.9 NG/DL (ref 0.8–1.7)
TRIGL SERPL-MCNC: 60 MG/DL (ref 30–149)
TSI SER-ACNC: 4.45 MIU/ML (ref 0.36–3.74)
VLDLC SERPL CALC-MCNC: 12 MG/DL (ref 0–30)
WBC # BLD AUTO: 5.2 X10(3) UL (ref 4–11)

## 2020-09-18 PROCEDURE — 80053 COMPREHEN METABOLIC PANEL: CPT

## 2020-09-18 PROCEDURE — 80061 LIPID PANEL: CPT

## 2020-09-18 PROCEDURE — 83036 HEMOGLOBIN GLYCOSYLATED A1C: CPT

## 2020-09-18 PROCEDURE — 84443 ASSAY THYROID STIM HORMONE: CPT

## 2020-09-18 PROCEDURE — 85025 COMPLETE CBC W/AUTO DIFF WBC: CPT

## 2020-09-18 PROCEDURE — 84439 ASSAY OF FREE THYROXINE: CPT

## 2020-09-22 DIAGNOSIS — E78.00 ELEVATED CHOLESTEROL: ICD-10-CM

## 2020-09-22 DIAGNOSIS — R73.03 PRE-DIABETES: ICD-10-CM

## 2020-09-22 DIAGNOSIS — E03.8 SUBCLINICAL HYPOTHYROIDISM: Primary | ICD-10-CM

## 2020-09-22 DIAGNOSIS — D64.9 ANEMIA, UNSPECIFIED TYPE: ICD-10-CM

## 2020-09-28 ENCOUNTER — HOSPITAL ENCOUNTER (EMERGENCY)
Facility: HOSPITAL | Age: 65
Discharge: HOME | End: 2020-09-28
Attending: EMERGENCY MEDICINE
Payer: MEDICARE

## 2020-09-28 VITALS
OXYGEN SATURATION: 100 % | SYSTOLIC BLOOD PRESSURE: 140 MMHG | RESPIRATION RATE: 16 BRPM | HEART RATE: 78 BPM | TEMPERATURE: 98 F | DIASTOLIC BLOOD PRESSURE: 88 MMHG

## 2020-09-28 DIAGNOSIS — S61.211A LACERATION OF LEFT INDEX FINGER WITHOUT FOREIGN BODY WITHOUT DAMAGE TO NAIL, INITIAL ENCOUNTER: Primary | ICD-10-CM

## 2020-09-28 PROCEDURE — 99282 EMERGENCY DEPT VISIT SF MDM: CPT

## 2020-09-28 PROCEDURE — 99283 EMERGENCY DEPT VISIT LOW MDM: CPT

## 2020-09-28 PROCEDURE — 12001 RPR S/N/AX/GEN/TRNK 2.5CM/<: CPT

## 2020-09-28 NOTE — ED INITIAL ASSESSMENT (HPI)
Pt cut finger with knife while cutting limes. Left 2nd digit, bleeding controlled.   Pt has tdap completed in march

## 2020-09-28 NOTE — ED PROVIDER NOTES
Patient Seen in: [facility] Emergency Department      History   Patient presents with:  Laceration/Abrasion    Stated Complaint: lac to finger    HPI    This is a 27-year-old female complaining of a laceration to her left index finger this morning. spurring   • Lateral epicondylitis     and medial    • LBP (low back pain)    • Leg pain     LE pain and weakness   • Lumbar foraminal stenosis    • Lumbar sprain    • Memory deficit    • Neck pain    • Night sweats     and chills   • Nocturia    • Oligome Review of Systems    Positive for stated complaint: lac to finger  Other systems are as noted in HPI. Constitutional and vital signs reviewed. All other systems reviewed and negative except as noted above.     Physical Exam     ED Triage Vitals diagnosis)    Disposition:  Discharge  9/28/2020  1:36 pm    Follow-up:  Janes Pierson MD  [address] [place] [address]  [phone]      As needed, for re-check          Medications Prescribed:  Discharge Medication List as of 9/

## 2020-11-10 ENCOUNTER — HOSPITAL ENCOUNTER (OUTPATIENT)
Dept: MAMMOGRAPHY | Age: 65
Discharge: HOME | End: 2020-11-10
Attending: INTERNAL MEDICINE
Payer: MEDICARE

## 2020-11-10 DIAGNOSIS — Z12.31 VISIT FOR SCREENING MAMMOGRAM: ICD-10-CM

## 2020-11-10 PROCEDURE — 77063 BREAST TOMOSYNTHESIS BI: CPT | Performed by: INTERNAL MEDICINE

## 2020-11-10 PROCEDURE — 77067 SCR MAMMO BI INCL CAD: CPT | Performed by: INTERNAL MEDICINE

## 2021-03-15 DIAGNOSIS — Z23 NEED FOR VACCINATION: ICD-10-CM

## 2021-03-17 ENCOUNTER — IMMUNIZATION (OUTPATIENT)
Dept: LAB | Age: 66
End: 2021-03-17
Attending: HOSPITALIST
Payer: MEDICARE

## 2021-03-17 DIAGNOSIS — Z23 NEED FOR VACCINATION: Primary | ICD-10-CM

## 2021-03-17 PROCEDURE — 0001A SARSCOV2 VAC 30MCG/0.3ML IM: CPT

## 2021-04-07 ENCOUNTER — IMMUNIZATION (OUTPATIENT)
Dept: LAB | Age: 66
End: 2021-04-07
Attending: HOSPITALIST
Payer: MEDICARE

## 2021-04-07 DIAGNOSIS — Z23 NEED FOR VACCINATION: Primary | ICD-10-CM

## 2021-04-07 PROCEDURE — 0002A SARSCOV2 VAC 30MCG/0.3ML IM: CPT

## 2021-04-14 ENCOUNTER — OFFICE VISIT (OUTPATIENT)
Dept: INTERNAL MEDICINE CLINIC | Facility: CLINIC | Age: 66
End: 2021-04-14
Payer: MEDICARE

## 2021-04-14 VITALS
BODY MASS INDEX: 21.34 KG/M2 | TEMPERATURE: 97 F | HEIGHT: 64 IN | HEART RATE: 78 BPM | SYSTOLIC BLOOD PRESSURE: 112 MMHG | DIASTOLIC BLOOD PRESSURE: 76 MMHG | WEIGHT: 125 LBS

## 2021-04-14 DIAGNOSIS — M62.9 MUSCULOSKELETAL DISORDER INVOLVING UPPER TRAPEZIUS MUSCLE: Primary | ICD-10-CM

## 2021-04-14 DIAGNOSIS — S69.92XD INJURY OF FINGER OF LEFT HAND, SUBSEQUENT ENCOUNTER: ICD-10-CM

## 2021-04-14 PROCEDURE — 99213 OFFICE O/P EST LOW 20 MIN: CPT | Performed by: NURSE PRACTITIONER

## 2021-04-14 NOTE — PROGRESS NOTES
Haley Hutton is a 77year old female. Patient presents with:  Neck Pain: AJ rm 10 neck pain. more pain on left side      HPI:   Presents for eval of neck pain   2 week duration. Worse turning neck to left. Also worse with certain movements.   Ibuprofen PATIENTS LEFT UPPER ARM PIV RED AND PUFFY AND HURTING PATIENT. PLACED NEW 20
PIV IN LEFT FORE ARM AND REMOVED UPPER ARM PIV. 1 STICK PATIENT TOLERATED
WELL. IV FLIUDS HOOKED BACK UP. PATIENT REQUESTED BRANDON. GAVE PATIENT BRANDON ductal hyperplasia of breast 3/17/2008    left   • Bloating    • Breast calcifications    • Breast nodule 10/7/2009    right, suspicious for malignancy but finding does not exhibit classic findings of breast cancer-s/p bx 10/09   • Cervical radiculopathy 1 hypertension    • URI (upper respiratory infection)    • Urinary retention    • Uterine fibroid 9/5/2002    3 discrete uterine fibroids   • Weakness 12/9/2014   • Weight gain       Social History:  Social History    Tobacco Use      Smoking status: Never S finger of left hand, subsequent encounter  Suspect will be chronic with known injruy and suture placement. She would like to see if ROM improves with therapy. No orders of the defined types were placed in this encounter.       Meds & Refills for this unknown

## 2021-04-22 ENCOUNTER — TELEPHONE (OUTPATIENT)
Dept: PHYSICAL THERAPY | Facility: HOSPITAL | Age: 66
End: 2021-04-22

## 2021-04-23 ENCOUNTER — OFFICE VISIT (OUTPATIENT)
Dept: OCCUPATIONAL MEDICINE | Facility: HOSPITAL | Age: 66
End: 2021-04-23
Attending: NURSE PRACTITIONER
Payer: MEDICARE

## 2021-04-23 PROCEDURE — 97165 OT EVAL LOW COMPLEX 30 MIN: CPT

## 2021-04-23 PROCEDURE — 97110 THERAPEUTIC EXERCISES: CPT

## 2021-04-23 NOTE — PROGRESS NOTES
OCCUPATIONAL THERAPY UPPER EXTREMITY EVALUATION   Referring Physician: Dr. Leon Pitts  Diagnosis: Injury of finger of left hand, subsequent encounter (F43.48IX)     Date of Service: 4/23/2021     PATIENT Marisol Simmons is a 77year old female who pres deficits noted to LUE hand.  Skilled OT services tasked w/ evaluation and collaborative treatment planning to maximize rehab potential. The results of the objective tests and measures show decreased L  strength, L D2 (index finger) YEE, reports of incre (lbs) Right Average Left Average   : 55.0 lbs 19.0 lbs   2 pt Pinch: 6.5 lbs 2.0 lbs   3 pt Pinch: 9.5 lbs 3.5 lbs   Lateral Pinch: 13.0 lbs 10.0 lbs     SPECIAL TESTS:   Nine-Hole Peg Test: R=18.4 sec; L=22.4    Today’s Treatment and Response:   Pt ed total of 8 visits over a 90 day period.   Treatment will include: Manual Therapy, Neuromuscular Re-education, Self-Care Home Management, Therapeutic Activities, Therapeutic Exercise and Home Exercise Program instruction    Education or treatment limitation:

## 2021-04-26 ENCOUNTER — OFFICE VISIT (OUTPATIENT)
Dept: OCCUPATIONAL MEDICINE | Facility: HOSPITAL | Age: 66
End: 2021-04-26
Attending: NURSE PRACTITIONER
Payer: MEDICARE

## 2021-04-26 PROCEDURE — 97110 THERAPEUTIC EXERCISES: CPT

## 2021-04-26 NOTE — PROGRESS NOTES
Dx: Injury of finger of left hand, subsequent encounter (S69.92XD)           Insurance (Authorized # of Visits):  2/8 Medicare/Suppl           Authorizing Physician: Dr. Selvin London  Next MD visit: none scheduled  Fall Risk: standard         Precautions: n/a today. Pt is progressing well with all therapy goals at this time. Goals: (to be met in 8 visits)  1. Pt will demonstrate increased L  strength of at least 35.0 lbs indicating increased ability to manage ADLs including hold cup for grooming tasks.

## 2021-04-28 ENCOUNTER — LAB ENCOUNTER (OUTPATIENT)
Dept: LAB | Age: 66
End: 2021-04-28
Attending: INTERNAL MEDICINE
Payer: MEDICARE

## 2021-04-28 DIAGNOSIS — E03.8 SUBCLINICAL HYPOTHYROIDISM: ICD-10-CM

## 2021-04-28 DIAGNOSIS — E78.00 ELEVATED CHOLESTEROL: ICD-10-CM

## 2021-04-28 DIAGNOSIS — D64.9 ANEMIA, UNSPECIFIED TYPE: ICD-10-CM

## 2021-04-28 DIAGNOSIS — R73.03 PRE-DIABETES: ICD-10-CM

## 2021-04-28 PROCEDURE — 80061 LIPID PANEL: CPT

## 2021-04-28 PROCEDURE — 84443 ASSAY THYROID STIM HORMONE: CPT

## 2021-04-28 PROCEDURE — 83036 HEMOGLOBIN GLYCOSYLATED A1C: CPT

## 2021-04-28 PROCEDURE — 36415 COLL VENOUS BLD VENIPUNCTURE: CPT

## 2021-04-28 PROCEDURE — 80053 COMPREHEN METABOLIC PANEL: CPT

## 2021-04-28 PROCEDURE — 85025 COMPLETE CBC W/AUTO DIFF WBC: CPT

## 2021-04-28 PROCEDURE — 84439 ASSAY OF FREE THYROXINE: CPT

## 2021-04-29 ENCOUNTER — OFFICE VISIT (OUTPATIENT)
Dept: OCCUPATIONAL MEDICINE | Facility: HOSPITAL | Age: 66
End: 2021-04-29
Attending: NURSE PRACTITIONER
Payer: MEDICARE

## 2021-04-29 DIAGNOSIS — R79.89 ELEVATED TSH: Primary | ICD-10-CM

## 2021-04-29 PROCEDURE — 97110 THERAPEUTIC EXERCISES: CPT

## 2021-04-29 NOTE — PROGRESS NOTES
Dx: Injury of finger of left hand, subsequent encounter (S69.92XD)           Insurance (Authorized # of Visits):  3/8 Medicare/Suppl           Authorizing Physician: Dr. Esther Aguilar  Next MD visit: none scheduled  Fall Risk: standard         Precautions: n/a benefit from continued skilled therapy services. Goals: (to be met in 8 visits)  1. Pt will demonstrate increased L  strength of at least 35.0 lbs indicating increased ability to manage ADLs including hold cup for grooming tasks.   2. Pt will demon Flexbar Exercises 1 set of 15 reps for clockwise rotation twist, counterclockwise, pronation, supination, wrist flexion, wrist extension  -Green Digiflex 1 set of 15 reps full ;  Yellow Digiflex 1 set of 15 reps individual digit          HEP: Tendon Gli

## 2021-05-03 ENCOUNTER — OFFICE VISIT (OUTPATIENT)
Dept: OCCUPATIONAL MEDICINE | Facility: HOSPITAL | Age: 66
End: 2021-05-03
Attending: NURSE PRACTITIONER
Payer: MEDICARE

## 2021-05-03 PROCEDURE — 97110 THERAPEUTIC EXERCISES: CPT

## 2021-05-03 NOTE — PROGRESS NOTES
Dx: Injury of finger of left hand, subsequent encounter (S69.92XD)           Insurance (Authorized # of Visits):  4/8 Medicare/Suppl           Authorizing Physician: Dr. Mata Brooks  Next MD visit: none scheduled  Fall Risk: standard         Precautions: n/a noticeable arthritic changes in BUE joints. Pt demonstrates 30.0 lbs L  strength and weakness in entire L arm most like d/t guarding it for the past 6 months.  Therapist faciliated exercises for elbow, forearm, wrist, and hand today with positive feedba increased blood flow and prep for PROM  -PROM Stretching to L MCP, PIP, DIP joints  -Joint Mobilization for LUE hand  -Red  Clip 3-Point Pinch [place] Sorting Strengthening Exercise Chinese  60 [place] Exercise  -Yellow Flexbar Exercises 1 set of

## 2021-05-04 ENCOUNTER — OFFICE VISIT (OUTPATIENT)
Dept: PHYSICAL THERAPY | Facility: HOSPITAL | Age: 66
End: 2021-05-04
Attending: NURSE PRACTITIONER
Payer: MEDICARE

## 2021-05-04 PROCEDURE — 97140 MANUAL THERAPY 1/> REGIONS: CPT

## 2021-05-04 PROCEDURE — 97162 PT EVAL MOD COMPLEX 30 MIN: CPT

## 2021-05-05 NOTE — PROGRESS NOTES
SPINE EVALUATION:   Referring Physician: Dr. Merry Reina  Diagnosis: Left neck pain     Date of Service: 5/5/2021     PATIENT Edgard Fletcher is a 77year old female who presents to therapy today with complaints of left sided neck pain.  Patient reports Costochondritis, Dehydration (4/15/2004), Depression, Dry mouth, Dysphagia, Exostosis (11/8/2011), Fatigue (3/6/2012), Fatigue, Feels cold (3/6/2012), HA (headache) (12/9/2014), Hair loss, Hand tingling, Headache(784.0), Hemiparesis ([address] Hip pain, Histor understanding and performs HEP correctly without reported pain. Skilled Physical Therapy is medically necessary to address the above impairments and reach functional goals.      Precautions:  None  OBJECTIVE:   Observation/Posture: Upper crossed  Neuro Scre involved/activity limitations, and evolving symptoms including changing pain levels. PLAN OF CARE:    Goals: (to be met in 10 visits)   1. Patient will show 75 degrees of left cervical rotation  2.  Patient will show 40 degrees of cervical extension with s

## 2021-05-06 ENCOUNTER — OFFICE VISIT (OUTPATIENT)
Dept: PHYSICAL THERAPY | Facility: HOSPITAL | Age: 66
End: 2021-05-06
Attending: NURSE PRACTITIONER
Payer: MEDICARE

## 2021-05-06 PROCEDURE — 97110 THERAPEUTIC EXERCISES: CPT

## 2021-05-06 PROCEDURE — 97140 MANUAL THERAPY 1/> REGIONS: CPT

## 2021-05-06 NOTE — PROGRESS NOTES
Dx: Left neck pain         Insurance (Authorized # of Visits):  8           Authorizing Physician: Dr. Leon Pitts  Next MD visit: none scheduled  Fall Risk: standard         Precautions: n/a             Subjective: Patient reports that she has no significant c

## 2021-05-07 ENCOUNTER — OFFICE VISIT (OUTPATIENT)
Dept: OCCUPATIONAL MEDICINE | Facility: HOSPITAL | Age: 66
End: 2021-05-07
Attending: NURSE PRACTITIONER
Payer: MEDICARE

## 2021-05-07 PROCEDURE — 97110 THERAPEUTIC EXERCISES: CPT

## 2021-05-07 NOTE — PROGRESS NOTES
Dx: Injury of finger of left hand, subsequent encounter (S69.92XD)           Insurance (Authorized # of Visits):  5/8 Medicare/Suppl           Authorizing Physician: Dr. Chato Florez  Next MD visit: none scheduled  Fall Risk: standard         Precautions: n/a 90 day period.  Treatment will include: Manual Therapy, Neuromuscular Re-education, Self-Care Home Management, Therapeutic Activities, Therapeutic Exercise and Home Exercise Program instruction    Date: 4/26/2021  TX#: 2/8 Date: 4/29/2021            TX#: 3 joints  -Joint Mobilization for L D2 MCP, PIP, DIP Joint  -Green Digiflex Full  2 sets of 10 reps;  Individual Digit Red Digiflex 2 sets of 10 reps  -Yellow Theraputty Large - flattening, 2-point pinch, and pull/ball formation exercises  -FMC/in-hand ma

## 2021-05-10 ENCOUNTER — OFFICE VISIT (OUTPATIENT)
Dept: OCCUPATIONAL MEDICINE | Facility: HOSPITAL | Age: 66
End: 2021-05-10
Attending: NURSE PRACTITIONER
Payer: MEDICARE

## 2021-05-10 PROCEDURE — 97110 THERAPEUTIC EXERCISES: CPT

## 2021-05-10 NOTE — PROGRESS NOTES
Dx: Injury of finger of left hand, subsequent encounter (S69.92XD)           Insurance (Authorized # of Visits):  6/8 Medicare/Suppl           Authorizing Physician: Dr. Camilo Hobson  Next MD visit: none scheduled  Fall Risk: standard         Precautions: n/a include: Manual Therapy, Neuromuscular Re-education, Self-Care Home Management, Therapeutic Activities, Therapeutic Exercise and Home Exercise Program instruction    Date: 4/29/2021            TX#: 3/8 Date: 5/3/2021                 TX#: 4/8 Date: 5/7/2021 joints  -Joint Mobilization for L D2 MCP, PIP, DIP Joint  -IASTM/STM to L D2 extensors  -Joint Blocking Exercises for R D2 PIP and DIP joints  -Green Digiflex Full  2 sets of 10 reps;  Red Digiflex Individual Digit 2 sets of 10 reps  -Andrade Flores

## 2021-05-11 ENCOUNTER — OFFICE VISIT (OUTPATIENT)
Dept: PHYSICAL THERAPY | Facility: HOSPITAL | Age: 66
End: 2021-05-11
Attending: NURSE PRACTITIONER
Payer: MEDICARE

## 2021-05-11 PROCEDURE — 97140 MANUAL THERAPY 1/> REGIONS: CPT

## 2021-05-11 PROCEDURE — 97110 THERAPEUTIC EXERCISES: CPT

## 2021-05-11 NOTE — PROGRESS NOTES
Dx: Left neck pain         Insurance (Authorized # of Visits):  8           Authorizing Physician: Dr. Juan Barrios  Next MD visit: none scheduled  Fall Risk: standard         Precautions: n/a             Subjective: Patient reports significant improvements in h rotation    Charges: Manual x 2; TE x 1       Total Timed Treatment: 43 min  Total Treatment Time: 43 min

## 2021-05-12 ENCOUNTER — OFFICE VISIT (OUTPATIENT)
Dept: OCCUPATIONAL MEDICINE | Facility: HOSPITAL | Age: 66
End: 2021-05-12
Attending: NURSE PRACTITIONER
Payer: MEDICARE

## 2021-05-12 PROCEDURE — 97110 THERAPEUTIC EXERCISES: CPT

## 2021-05-12 PROCEDURE — 97140 MANUAL THERAPY 1/> REGIONS: CPT

## 2021-05-12 NOTE — PROGRESS NOTES
Dx: Injury of finger of left hand, subsequent encounter (S69.92XD)           Insurance (Authorized # of Visits):  7/8 Medicare/Suppl           Authorizing Physician: Dr. Stella Bronson  Next MD visit: none scheduled  Fall Risk: standard         Precautions: n/a Patient will be seen for 2x/week or a total of 8 visits over a 90 day period.  Treatment will include: Manual Therapy, Neuromuscular Re-education, Self-Care Home Management, Therapeutic Activities, Therapeutic Exercise and Home Exercise Program instruction min hot pack to L hand for increased blood flow and prep for PROM  -PROM Stretching and joint mobilization to L D2 MCP, PIP, DIP joints  -Joint Mobilization for L D2 MCP, PIP, DIP Joint  -Joint Blocking to L D2 and D5 PIP and IP joints  -Hammer Pronation/S

## 2021-05-13 ENCOUNTER — OFFICE VISIT (OUTPATIENT)
Dept: PHYSICAL THERAPY | Facility: HOSPITAL | Age: 66
End: 2021-05-13
Attending: NURSE PRACTITIONER
Payer: MEDICARE

## 2021-05-13 PROCEDURE — 97110 THERAPEUTIC EXERCISES: CPT

## 2021-05-13 PROCEDURE — 97140 MANUAL THERAPY 1/> REGIONS: CPT

## 2021-05-13 NOTE — PROGRESS NOTES
Dx: Left neck pain         Insurance (Authorized # of Visits):  8           Authorizing Physician: Dr. Nat Terry  Next MD visit: none scheduled  Fall Risk: standard         Precautions: n/a             Subjective: Patient reports that she continues to have si half foam roll shoulder flexion and SA punch with dowel 2x15 each TE  Repeated motions rotation and extension: 30 each  Thoracic rotation x10 BL  MWM extension x10  J-Bar STM with intermittent movement x 8'     HEP: SNAG; chin tuck; thoracic rotation    Ch

## 2021-05-17 ENCOUNTER — OFFICE VISIT (OUTPATIENT)
Dept: OCCUPATIONAL MEDICINE | Facility: HOSPITAL | Age: 66
End: 2021-05-17
Attending: NURSE PRACTITIONER
Payer: MEDICARE

## 2021-05-17 PROCEDURE — 97140 MANUAL THERAPY 1/> REGIONS: CPT

## 2021-05-17 PROCEDURE — 97110 THERAPEUTIC EXERCISES: CPT

## 2021-05-17 NOTE — PROGRESS NOTES
Progress Summary  Dx:  Injury of finger of left hand, subsequent encounter (S69.92XD)           Insurance (Authorized # of Visits):  8/8 Medicare/Suppl           Authorizing Physician: Dr. Leon Pitts  Next MD visit: none scheduled  Fall Risk: standard AROM WNL. AROM deficits noted for D2 as noted below.     D2 (Index Finger)   MP 0-85   PIP 0-90   DIP 0-69         Strength (lbs) Right Average Left Average   : 46.0 lbs 40.0 lbs   2 pt Pinch: 7.0 lbs 4.0 lbs   3 pt Pinch: 12.0 lbs 6.5 lbs   Late options and has agreed to actively participate in planning and for this course of care. Thank you for your referral. If you have any questions, please contact me at Dept: [phone].     Sincerely,  Electronically signed by therapist: Tony Singh OT and D5 PIP and IP joints  -Hammer Pronation/Supination strengthening exercises 2 sets of 10 reps eac  -Velcro Block Green  Clip 3-Point Pinch coordination sorting exercise  -Green Flexbar Exercises for pronation and supination 2 sets of 10 reps each -R

## 2021-05-18 ENCOUNTER — OFFICE VISIT (OUTPATIENT)
Dept: PHYSICAL THERAPY | Facility: HOSPITAL | Age: 66
End: 2021-05-18
Attending: NURSE PRACTITIONER
Payer: MEDICARE

## 2021-05-18 PROCEDURE — 97140 MANUAL THERAPY 1/> REGIONS: CPT

## 2021-05-18 PROCEDURE — 97110 THERAPEUTIC EXERCISES: CPT

## 2021-05-18 NOTE — PROGRESS NOTES
Dx: Left neck pain         Insurance (Authorized # of Visits):  8           Authorizing Physician: Dr. Luly Laguna  Next MD visit: none scheduled  Fall Risk: standard         Precautions: n/a             Subjective: No changes to report in her cervical extensio sitting x 3'    TE  SNAG 3x10  Chin tuck 3x10x3 sec  Upper trap tennis ball STM x 3' TE  Seated thoracic rotation 3x10  Vertical half foam roll shoulder flexion and SA punch with dowel 2x15 each TE  Repeated motions rotation and extension: 30 each  Thoraci

## 2021-05-19 ENCOUNTER — OFFICE VISIT (OUTPATIENT)
Dept: OCCUPATIONAL MEDICINE | Facility: HOSPITAL | Age: 66
End: 2021-05-19
Attending: NURSE PRACTITIONER
Payer: MEDICARE

## 2021-05-19 PROCEDURE — 97140 MANUAL THERAPY 1/> REGIONS: CPT

## 2021-05-19 PROCEDURE — 97035 APP MDLTY 1+ULTRASOUND EA 15: CPT

## 2021-05-19 PROCEDURE — 97110 THERAPEUTIC EXERCISES: CPT

## 2021-05-19 NOTE — PROGRESS NOTES
Dx: Injury of finger of left hand, subsequent encounter (S69.92XD)           Insurance (Authorized # of Visits):  9/12 Medicare/Suppl          Authorizing Physician: Dr. Hermes Crow  Next MD visit: none scheduled  Fall Risk: standard         Precautions: n/a will demonstrate increased L  strength of at least 45.0 lbs indicating increased ability to manage ADLs including hold cup for grooming tasks.   8. Pt will demonstrate at least 8.0 lbs LUE 3-Point pinch strength indicating increased ability to manage pi 3.3 MHz, 100% duty, 0.5 W/cm2  -PROM Stretching and joint mobilization to L D2 MCP, PIP, DIP joints  -Joint Mobilization for L D2 MCP, PIP, DIP Joint  -Joint Blocking to L D2 and D5 PIP and IP joints  -Velcro Blocks with D2 DIP flexion strengthening  -[place]

## 2021-05-20 ENCOUNTER — OFFICE VISIT (OUTPATIENT)
Dept: PHYSICAL THERAPY | Facility: HOSPITAL | Age: 66
End: 2021-05-20
Attending: NURSE PRACTITIONER
Payer: MEDICARE

## 2021-05-20 PROCEDURE — 97140 MANUAL THERAPY 1/> REGIONS: CPT

## 2021-05-20 PROCEDURE — 97110 THERAPEUTIC EXERCISES: CPT

## 2021-05-20 NOTE — PROGRESS NOTES
Dx: Left neck pain         Insurance (Authorized # of Visits):  8           Authorizing Physician: Dr. Kerri Collado  Next MD visit: none scheduled  Fall Risk: standard         Precautions: n/a             Subjective: Patient reports that she was feeling well unt 8'  Forearm scoop with intermittent chin tuck x 6'  Distraction bouts x 3' Manual  Upper/lower cervical PA x 5'  Physiological SB lower x 5'  Physiological rotation x 5'  [place], levator STM x 5'  Forearm scoop x 5'  CT mobilization in sitting x 3' Manual

## 2021-05-24 ENCOUNTER — OFFICE VISIT (OUTPATIENT)
Dept: OCCUPATIONAL MEDICINE | Facility: HOSPITAL | Age: 66
End: 2021-05-24
Attending: NURSE PRACTITIONER
Payer: MEDICARE

## 2021-05-24 PROCEDURE — 97140 MANUAL THERAPY 1/> REGIONS: CPT

## 2021-05-24 PROCEDURE — 97110 THERAPEUTIC EXERCISES: CPT

## 2021-05-24 PROCEDURE — 97035 APP MDLTY 1+ULTRASOUND EA 15: CPT

## 2021-05-24 NOTE — PROGRESS NOTES
Dx: Injury of finger of left hand, subsequent encounter (S69.92XD)           Insurance (Authorized # of Visits):  10/12 Medicare/Suppl          Authorizing Physician: Dr. Alyssa Camacho  Next MD visit: none scheduled  Fall Risk: standard         Precautions: n/a and compliant with comprehensive HEP to maintain progress achieved in OT. MET  7. Pt will demonstrate increased L  strength of at least 45.0 lbs indicating increased ability to manage ADLs including hold cup for grooming tasks.   8. Pt will demonstrate counterclockwise turn 2 sets of 10 reps each -US treatment to D2 digit extensors for 7 min with settings for 3.3 MHz, 100% duty, 0.5 W/cm2  -PROM Stretching and joint mobilization to L D2 MCP, PIP, DIP joints  -Joint Mobilization for L D2 MCP, PIP, DIP Nancy

## 2021-05-25 ENCOUNTER — OFFICE VISIT (OUTPATIENT)
Dept: PHYSICAL THERAPY | Facility: HOSPITAL | Age: 66
End: 2021-05-25
Attending: NURSE PRACTITIONER
Payer: MEDICARE

## 2021-05-25 PROCEDURE — 97110 THERAPEUTIC EXERCISES: CPT

## 2021-05-25 PROCEDURE — 97140 MANUAL THERAPY 1/> REGIONS: CPT

## 2021-05-25 NOTE — PROGRESS NOTES
Dx: Left neck pain         Insurance (Authorized # of Visits):  8           Authorizing Physician: Dr. Mohini Hines MD visit: none scheduled  Fall Risk: standard         Precautions: n/a             Subjective: Patient's HEP over the weekend consisted of s levator STM x 5'  Forearm scoop x 5'  CT mobilization in sitting x 3' Manual  Upper/lower cervical PA x 5'  Physiological rotation x 5'  STM / trigger point release posterior shoulder girdle x 6'  Cross arm stretch 10x10 sec Manual  Cervical PA grade III-I

## 2021-05-26 ENCOUNTER — OFFICE VISIT (OUTPATIENT)
Dept: OCCUPATIONAL MEDICINE | Facility: HOSPITAL | Age: 66
End: 2021-05-26
Attending: NURSE PRACTITIONER
Payer: MEDICARE

## 2021-05-26 PROCEDURE — 97035 APP MDLTY 1+ULTRASOUND EA 15: CPT

## 2021-05-26 PROCEDURE — 97140 MANUAL THERAPY 1/> REGIONS: CPT

## 2021-05-26 PROCEDURE — 97110 THERAPEUTIC EXERCISES: CPT

## 2021-05-26 NOTE — PROGRESS NOTES
Dx: Injury of finger of left hand, subsequent encounter (S69.92XD)           Insurance (Authorized # of Visits):  11/12 Medicare/Suppl          Authorizing Physician: Dr. Quyen Watts  Next MD visit: none scheduled  Fall Risk: standard         Precautions: n/a IADL management. MET  4. Pt will demonstrate at least 19.0 sec on the Nine-Hole Peg Test for LUE indicating increased [address] for functional ADL/IADL management.   5. Pt will report no pain during functional ADL/IADL task management indicating increased ability joint mobilization to L D2 MCP, PIP, DIP joints  -Joint Mobilization for L D2 MCP, PIP, DIP Joint  -Joint Blocking to L D2 and D5 PIP and IP joints  -Blue Digiflex Full /Red Digiflex Individual Digit 2 sets of 10 reps each  -Red/Green Flexbar - pronati

## 2021-05-27 ENCOUNTER — OFFICE VISIT (OUTPATIENT)
Dept: PHYSICAL THERAPY | Facility: HOSPITAL | Age: 66
End: 2021-05-27
Attending: NURSE PRACTITIONER
Payer: MEDICARE

## 2021-05-27 PROCEDURE — 97140 MANUAL THERAPY 1/> REGIONS: CPT

## 2021-05-27 PROCEDURE — 97110 THERAPEUTIC EXERCISES: CPT

## 2021-05-27 NOTE — PROGRESS NOTES
Dx: Left neck pain         Insurance (Authorized # of Visits):  8           Authorizing Physician: Dr. Chato Florez  Next MD visit: none scheduled  Fall Risk: standard         Precautions: n/a             Subjective: She reports maintained symptoms with extensio trigger point release posterior shoulder girdle x 6'  Cross arm stretch 10x10 sec Manual  Cervical PA grade III-IV xx 6'  NAG x 6'  Cupping upper trap x 5'  Cuff STM x 6'   Distraction bouts x 3' Manual  Cupping upper trap x 3'  STM levator/sclenes x 6'  P

## 2021-06-01 ENCOUNTER — OFFICE VISIT (OUTPATIENT)
Dept: PHYSICAL THERAPY | Facility: HOSPITAL | Age: 66
End: 2021-06-01
Attending: INTERNAL MEDICINE
Payer: MEDICARE

## 2021-06-01 PROCEDURE — 97110 THERAPEUTIC EXERCISES: CPT

## 2021-06-01 PROCEDURE — 97140 MANUAL THERAPY 1/> REGIONS: CPT

## 2021-06-01 NOTE — PROGRESS NOTES
Dx: Left neck pain         Insurance (Authorized # of Visits):  8           Authorizing Physician: Dr. Daniella Hernández  Next MD visit: none scheduled  Fall Risk: standard         Precautions: n/a             Subjective: Patient reports that she is doing better. Distraction bouts x 3' Manual  Cupping upper trap x 3'  STM levator/sclenes x 6'  PA in sitting x 6'  SNAG extension 3x10 Manual  STM levator, scalenes x 6'  NAG x 3'  SNAG 4x10  Distraction bouts x 3'  Distraction with extension 10x  PA and transverse i

## 2021-06-07 ENCOUNTER — OFFICE VISIT (OUTPATIENT)
Dept: PHYSICAL THERAPY | Facility: HOSPITAL | Age: 66
End: 2021-06-07
Attending: INTERNAL MEDICINE
Payer: MEDICARE

## 2021-06-07 PROCEDURE — 97140 MANUAL THERAPY 1/> REGIONS: CPT

## 2021-06-07 PROCEDURE — 97110 THERAPEUTIC EXERCISES: CPT

## 2021-06-07 NOTE — PROGRESS NOTES
Dx: Left neck pain         Insurance (Authorized # of Visits):  8           Authorizing Physician: Dr. Esther Aguilar  Next MD visit: none scheduled  Fall Risk: standard         Precautions: n/a             ProgressSummary  Pt has attended 10 visits in Physical Th reach and lift OH with symptoms < 3/10    FOTO: 59/100    Plan: Continue skilled Physical Therapy 1-2 x/week or a total of 8 visits over a 90 day period.  Treatment will include: therapeutic exercise; manual       Patient/Family/Caregiver was advised of the sitting x 8' Manual  STM levator, scalenes x 10'  SNAG extension  4x10   TE  Repeated motions rotation and extension: 30 each  Thoracic rotation x10 BL  MWM extension x10  J-Bar STM with intermittent movement x 8' TE  Thoracic rotation x 6'  Chin tuck on b

## 2021-06-08 ENCOUNTER — APPOINTMENT (OUTPATIENT)
Dept: PHYSICAL THERAPY | Facility: HOSPITAL | Age: 66
End: 2021-06-08
Attending: NURSE PRACTITIONER
Payer: MEDICARE

## 2021-06-10 ENCOUNTER — OFFICE VISIT (OUTPATIENT)
Dept: OCCUPATIONAL MEDICINE | Facility: HOSPITAL | Age: 66
End: 2021-06-10
Attending: NURSE PRACTITIONER
Payer: MEDICARE

## 2021-06-10 PROCEDURE — 97140 MANUAL THERAPY 1/> REGIONS: CPT

## 2021-06-10 PROCEDURE — 97035 APP MDLTY 1+ULTRASOUND EA 15: CPT

## 2021-06-10 PROCEDURE — 97110 THERAPEUTIC EXERCISES: CPT

## 2021-06-10 NOTE — PROGRESS NOTES
Progress Summary  Dx:  Injury of finger of left hand, subsequent encounter (S69.92XD)           Insurance (Authorized # of Visits):  12/12 Medicare/Suppl          Authorizing Physician: Dr. Camilo Hobson  Next MD visit: none scheduled  Fall Risk: standard management. MET  4. Pt will demonstrate at least 19.0 sec on the Nine-Hole Peg Test for LUE indicating increased DELTA [facility] for functional ADL/IADL management.   5. Pt will report no pain during functional ADL/IADL task management indicating increased ability to f mobilization to L D2 MCP, PIP, DIP joints  -Joint Mobilization for L D2 MCP, PIP, DIP Joint  -Joint Blocking to L D2 and D5 PIP and IP joints  -Blue Digiflex Full /Red Digiflex Individual Digit 2 sets of 10 reps each  -Red/Green Flexbar - pronation, geiger

## 2021-06-16 ENCOUNTER — OFFICE VISIT (OUTPATIENT)
Dept: PHYSICAL THERAPY | Facility: HOSPITAL | Age: 66
End: 2021-06-16
Attending: INTERNAL MEDICINE
Payer: MEDICARE

## 2021-06-16 PROCEDURE — 97140 MANUAL THERAPY 1/> REGIONS: CPT

## 2021-06-16 PROCEDURE — 97110 THERAPEUTIC EXERCISES: CPT

## 2021-06-16 NOTE — PROGRESS NOTES
Dx: Left neck pain         Insurance (Authorized # of Visits):  25          Authorizing Physician: Dr. Pabon ref.  provider found  Next MD visit: none scheduled  Fall Risk: standard         Precautions: n/a             Subjective: Patient reports that she has 5'  Cuff STM x 6'   Distraction bouts x 3' Manual  Cupping upper trap x 3'  STM levator/sclenes x 6'  PA in sitting x 6'  SNAG extension 3x10 Manual  STM levator, scalenes x 6'  NAG x 3'  SNAG 4x10  Distraction bouts x 3'  Distraction with extension 10x  P

## 2021-06-21 ENCOUNTER — OFFICE VISIT (OUTPATIENT)
Dept: OCCUPATIONAL MEDICINE | Facility: HOSPITAL | Age: 66
End: 2021-06-21
Attending: NURSE PRACTITIONER
Payer: MEDICARE

## 2021-06-21 PROCEDURE — 97110 THERAPEUTIC EXERCISES: CPT

## 2021-06-21 PROCEDURE — 97140 MANUAL THERAPY 1/> REGIONS: CPT

## 2021-06-21 PROCEDURE — 97035 APP MDLTY 1+ULTRASOUND EA 15: CPT

## 2021-06-21 NOTE — PROGRESS NOTES
Dx: Injury of finger of left hand, subsequent encounter (S69.92XD)           Insurance (Authorized # of Visits):  13/16 Medicare/Suppl          Authorizing Physician: Dr. Hossein Bullock  Next MD visit: none scheduled  Fall Risk: standard         Precautions: n/a pinch strength indicating increased ability to manage picking up small objects. MET  3. Pt will demonstrate at least 240 YEE for LUE D2 (Index Finger) indicating increaseda bility to manage gripping and carry activities for IADL management. MET  4.  Pt will L D2 P3  -Pt education on use of heat, contrast bath  -Red Theraputty Opposition Pinch -Re-Assessment of Objective Measures  -US treatment to D2 digit extensors for 7 min with settings for 3.3 MHz, 100% duty, 0.5 W/cm2  -US treatment to D2 D3 scar adhesion

## 2021-06-23 ENCOUNTER — OFFICE VISIT (OUTPATIENT)
Dept: OCCUPATIONAL MEDICINE | Facility: HOSPITAL | Age: 66
End: 2021-06-23
Attending: NURSE PRACTITIONER
Payer: MEDICARE

## 2021-06-23 PROCEDURE — 97035 APP MDLTY 1+ULTRASOUND EA 15: CPT

## 2021-06-23 PROCEDURE — 97140 MANUAL THERAPY 1/> REGIONS: CPT

## 2021-06-23 PROCEDURE — 97110 THERAPEUTIC EXERCISES: CPT

## 2021-06-23 NOTE — PROGRESS NOTES
Dx: Injury of finger of left hand, subsequent encounter (S69.92XD)           Insurance (Authorized # of Visits):  14/16 Medicare/Suppl          Authorizing Physician: Dr. Selvin London  Next MD visit: none scheduled  Fall Risk: standard         Precautions: n/a hold cup for grooming tasks. MET  2. Pt will demonstrate at least 6.0 lbs LUE 3-Point pinch strength indicating increased ability to manage picking up small objects. MET  3.  Pt will demonstrate at least 240 YEE for LUE D2 (Index Finger) indicating increase with multiple textures/surfaces to L D2 P3 scar tissue -US treatment to D2 digit extensors for 8 min with settings for 3.3 MHz, 50% duty, 0.5 W/cm2  -STM/IASTM to L D2 P3  -Scar Massage to L D2 P3  -Retrograde Massage to L D2  -Red  Clip 3-Point Pinch

## 2021-06-24 ENCOUNTER — TELEPHONE (OUTPATIENT)
Dept: PHYSICAL THERAPY | Facility: HOSPITAL | Age: 66
End: 2021-06-24

## 2021-06-28 ENCOUNTER — OFFICE VISIT (OUTPATIENT)
Dept: OCCUPATIONAL MEDICINE | Facility: HOSPITAL | Age: 66
End: 2021-06-28
Attending: NURSE PRACTITIONER
Payer: MEDICARE

## 2021-06-28 PROCEDURE — 97110 THERAPEUTIC EXERCISES: CPT

## 2021-06-28 PROCEDURE — 97140 MANUAL THERAPY 1/> REGIONS: CPT

## 2021-06-28 PROCEDURE — 97035 APP MDLTY 1+ULTRASOUND EA 15: CPT

## 2021-06-28 NOTE — PROGRESS NOTES
Dx: Injury of finger of left hand, subsequent encounter (S69.92XD)           Insurance (Authorized # of Visits):  15/16 Medicare/Suppl          Authorizing Physician: Dr. Nicole Jaun  Next MD visit: none scheduled  Fall Risk: standard         Precautions: n/a LUE D2 (Index Finger) indicating increaseda bility to manage gripping and carry activities for IADL management. MET  4. Pt will demonstrate at least 19.0 sec on the Nine-Hole Peg Test for LUE indicating increased DELTA [facility] for functional ADL/IADL management.   5. flexion, wrist extension, clockwise turn, counterclockwise turn 2 sets of 15 reps each  -Rubber Band Digit Extension/Abduction 2 sets of 15 reps each       HEP: Tendon Gliding Exercises, Joint Blocking Exercises, & Dycem Scar Massage, Yellow/Red Theraputty

## 2021-06-30 ENCOUNTER — APPOINTMENT (OUTPATIENT)
Dept: PHYSICAL THERAPY | Facility: HOSPITAL | Age: 66
End: 2021-06-30
Attending: INTERNAL MEDICINE
Payer: MEDICARE

## 2021-07-01 ENCOUNTER — HOSPITAL ENCOUNTER (EMERGENCY)
Facility: HOSPITAL | Age: 66
Discharge: HOME | End: 2021-07-01
Attending: EMERGENCY MEDICINE
Payer: MEDICARE

## 2021-07-01 ENCOUNTER — OFFICE VISIT (OUTPATIENT)
Dept: PHYSICAL THERAPY | Facility: HOSPITAL | Age: 66
End: 2021-07-01
Attending: INTERNAL MEDICINE
Payer: MEDICARE

## 2021-07-01 ENCOUNTER — APPOINTMENT (OUTPATIENT)
Dept: CT IMAGING | Facility: HOSPITAL | Age: 66
End: 2021-07-01
Attending: EMERGENCY MEDICINE
Payer: MEDICARE

## 2021-07-01 VITALS
DIASTOLIC BLOOD PRESSURE: 88 MMHG | WEIGHT: 116 LBS | OXYGEN SATURATION: 96 % | HEART RATE: 72 BPM | HEIGHT: 64 IN | RESPIRATION RATE: 16 BRPM | TEMPERATURE: 97 F | SYSTOLIC BLOOD PRESSURE: 132 MMHG | BODY MASS INDEX: 19.81 KG/M2

## 2021-07-01 DIAGNOSIS — H57.12 PAIN OF LEFT EYE: Primary | ICD-10-CM

## 2021-07-01 LAB
ALBUMIN SERPL-MCNC: 4.4 G/DL (ref 3.4–5)
ALBUMIN/GLOB SERPL: 1.2 {RATIO} (ref 1–2)
ALP LIVER SERPL-CCNC: 95 U/L
ALT SERPL-CCNC: 28 U/L
ANION GAP SERPL CALC-SCNC: 6 MMOL/L (ref 0–18)
AST SERPL-CCNC: 16 U/L (ref 15–37)
BASOPHILS # BLD AUTO: 0.03 X10(3) UL (ref 0–0.2)
BASOPHILS NFR BLD AUTO: 0.5 %
BILIRUB SERPL-MCNC: 0.4 MG/DL (ref 0.1–2)
BUN BLD-MCNC: 27 MG/DL (ref 7–18)
BUN/CREAT SERPL: 49.1 (ref 10–20)
CALCIUM BLD-MCNC: 9.2 MG/DL (ref 8.5–10.1)
CHLORIDE SERPL-SCNC: 104 MMOL/L (ref 98–112)
CO2 SERPL-SCNC: 27 MMOL/L (ref 21–32)
CREAT BLD-MCNC: 0.55 MG/DL
CRP SERPL-MCNC: <0.29 MG/DL (ref ?–0.3)
DEPRECATED RDW RBC AUTO: 39.9 FL (ref 35.1–46.3)
EOSINOPHIL # BLD AUTO: 0.04 X10(3) UL (ref 0–0.7)
EOSINOPHIL NFR BLD AUTO: 0.7 %
ERYTHROCYTE [DISTWIDTH] IN BLOOD BY AUTOMATED COUNT: 12.9 % (ref 11–15)
GLOBULIN PLAS-MCNC: 3.6 G/DL (ref 2.8–4.4)
GLUCOSE BLD-MCNC: 97 MG/DL (ref 70–99)
HCT VFR BLD AUTO: 43.1 %
HGB BLD-MCNC: 14.7 G/DL
IMM GRANULOCYTES # BLD AUTO: 0.01 X10(3) UL (ref 0–1)
IMM GRANULOCYTES NFR BLD: 0.2 %
LYMPHOCYTES # BLD AUTO: 2.14 X10(3) UL (ref 1–4)
LYMPHOCYTES NFR BLD AUTO: 35.3 %
M PROTEIN MFR SERPL ELPH: 8 G/DL (ref 6.4–8.2)
MCH RBC QN AUTO: 29.3 PG (ref 26–34)
MCHC RBC AUTO-ENTMCNC: 34.1 G/DL (ref 31–37)
MCV RBC AUTO: 86 FL
MONOCYTES # BLD AUTO: 0.38 X10(3) UL (ref 0.1–1)
MONOCYTES NFR BLD AUTO: 6.3 %
NEUTROPHILS # BLD AUTO: 3.46 X10 (3) UL (ref 1.5–7.7)
NEUTROPHILS # BLD AUTO: 3.46 X10(3) UL (ref 1.5–7.7)
NEUTROPHILS NFR BLD AUTO: 57 %
OSMOLALITY SERPL CALC.SUM OF ELEC: 289 MOSM/KG (ref 275–295)
PLATELET # BLD AUTO: 178 10(3)UL (ref 150–450)
POTASSIUM SERPL-SCNC: 3.6 MMOL/L (ref 3.5–5.1)
RBC # BLD AUTO: 5.01 X10(6)UL
SED RATE-ML: 9 MM/HR
SODIUM SERPL-SCNC: 137 MMOL/L (ref 136–145)
WBC # BLD AUTO: 6.1 X10(3) UL (ref 4–11)

## 2021-07-01 PROCEDURE — 86140 C-REACTIVE PROTEIN: CPT | Performed by: EMERGENCY MEDICINE

## 2021-07-01 PROCEDURE — 99284 EMERGENCY DEPT VISIT MOD MDM: CPT

## 2021-07-01 PROCEDURE — 36415 COLL VENOUS BLD VENIPUNCTURE: CPT

## 2021-07-01 PROCEDURE — 97140 MANUAL THERAPY 1/> REGIONS: CPT

## 2021-07-01 PROCEDURE — 80053 COMPREHEN METABOLIC PANEL: CPT | Performed by: EMERGENCY MEDICINE

## 2021-07-01 PROCEDURE — 85025 COMPLETE CBC W/AUTO DIFF WBC: CPT | Performed by: EMERGENCY MEDICINE

## 2021-07-01 PROCEDURE — 85652 RBC SED RATE AUTOMATED: CPT | Performed by: EMERGENCY MEDICINE

## 2021-07-01 PROCEDURE — 70450 CT HEAD/BRAIN W/O DYE: CPT | Performed by: EMERGENCY MEDICINE

## 2021-07-01 PROCEDURE — 97110 THERAPEUTIC EXERCISES: CPT

## 2021-07-01 NOTE — PROGRESS NOTES
Dx: Left neck pain         Insurance (Authorized # of Visits):  25          Authorizing Physician: Dr. Pabon ref.  provider found  Next MD visit: none scheduled  Fall Risk: standard         Precautions: n/a             Subjective: Patient has been away from th Manual  STM levator, scalenes x 6'  NAG x 3'  SNAG 4x10  Distraction bouts x 3'  Distraction with extension 10x  PA and transverse in sitting x 8' Manual  STM levator, scalenes x 10'  SNAG extension  4x10 Manual  STM x 6'  Upper trap trigger point release;

## 2021-07-02 ENCOUNTER — OFFICE VISIT (OUTPATIENT)
Dept: RHEUMATOLOGY | Facility: CLINIC | Age: 66
End: 2021-07-02
Payer: MEDICARE

## 2021-07-02 ENCOUNTER — TELEPHONE (OUTPATIENT)
Dept: RHEUMATOLOGY | Facility: CLINIC | Age: 66
End: 2021-07-02

## 2021-07-02 ENCOUNTER — OFFICE VISIT (OUTPATIENT)
Dept: OCCUPATIONAL MEDICINE | Facility: HOSPITAL | Age: 66
End: 2021-07-02
Attending: NURSE PRACTITIONER
Payer: MEDICARE

## 2021-07-02 VITALS
HEART RATE: 80 BPM | RESPIRATION RATE: 16 BRPM | DIASTOLIC BLOOD PRESSURE: 70 MMHG | HEIGHT: 64 IN | TEMPERATURE: 98 F | SYSTOLIC BLOOD PRESSURE: 114 MMHG | WEIGHT: 122 LBS | BODY MASS INDEX: 20.83 KG/M2

## 2021-07-02 DIAGNOSIS — R51.9 NONINTRACTABLE HEADACHE, UNSPECIFIED CHRONICITY PATTERN, UNSPECIFIED HEADACHE TYPE: Primary | ICD-10-CM

## 2021-07-02 DIAGNOSIS — H04.129 DRY EYE: ICD-10-CM

## 2021-07-02 PROCEDURE — 97035 APP MDLTY 1+ULTRASOUND EA 15: CPT

## 2021-07-02 PROCEDURE — 97140 MANUAL THERAPY 1/> REGIONS: CPT

## 2021-07-02 PROCEDURE — 99213 OFFICE O/P EST LOW 20 MIN: CPT | Performed by: INTERNAL MEDICINE

## 2021-07-02 PROCEDURE — 97110 THERAPEUTIC EXERCISES: CPT

## 2021-07-02 NOTE — PROGRESS NOTES
Discharge Summary  Dx:  Injury of finger of left hand, subsequent encounter (S69.92XD)           Insurance (Authorized # of Visits):  16/16 Medicare/Suppl          Authorizing Physician: Dr. Hermes Crow  Next MD visit: none scheduled  Fall Risk: standard Average   : 54.0 lbs 50.0 lbs   2 pt Pinch: 8.0 lbs 5.5 lbs   3 pt Pinch: 13.0 lbs 8.5 lbs   Lateral Pinch: 15.0 lbs 11.0 lbs      Nine-Hole Peg Test: R=19.5 sec; L=21.3 sec    Goals:   1.  Pt will demonstrate increased L  strength of at least 35.0 treatment to D2 digit extensors for 8 min with settings for 3.3 MHz, 50% duty, 0.5 W/cm2  -STM/IASTM to L D2 P3  -Scar Massage to L D2 P3  -Retrograde Massage to L D2  -Red  Clip 3-Point Pinch and [address] [place] sorting exercise  -Red Digiflex Individual Kyara Beltran

## 2021-07-02 NOTE — ED INITIAL ASSESSMENT (HPI)
Pt c/o eye pain in left eye that started around noon today; went to ophthalmologist who sent pt over to R/O temporal arteritis

## 2021-07-02 NOTE — PROGRESS NOTES
Rheumatology Follow-up Note  =====================================================================================================      Date of visit: 7/2/2021  ? Patient presents with: Follow - Up:  hospital f/u of dr. Rachell Powell pt.  Had extreme eye p symptoms, fevers, chills, night sweats. No pain with chewing.        Review of Systems:  General: Denies any fevers, weight loss  Respiratory: Denies any SOB, WEST, cough  Cardiovascular: Denies any chest pain, palpitations   MSK: see HPI  Skin: Denies an joint   • Fatigue 3/6/2012   • Fatigue    • Feels cold 3/6/2012   • HA (headache) 12/9/2014   • Hair loss    • Hand tingling    • Headache(784.0)    • Hemiparesis (HCC)    • Hip pain    • History of bone density study 10/2/2010   • HYPERTENSION NOS 8/8/200 s/p right eye prosthesis   • STEREOTACTIC BREAST BIOPSY  2/25/2008    microcalcifications, L breast, and placement of metal clip     Family History:  Family History   Problem Relation Age of Onset   • Other (Other) Father         cva   • Other (Other) M EOPERCENT 0.7 07/01/2021    BAPERCENT 0.5 07/01/2021    NE 3.46 07/01/2021    LYMABS 2.14 07/01/2021    MOABSO 0.38 07/01/2021    EOABSO 0.04 07/01/2021    BAABSO 0.03 07/01/2021     Lab Results   Component Value Date    GLU 97 07/01/2021    BUN 27 (H) 07/ issues with vision appear more refractive in nature given intermittent haziness and flashes surrounding objects. Also with a gritty sensation on the eye. Patient's symptoms have improved 80% subjectively since yesterday without targeted intervention.  Joanne Galvan

## 2021-07-02 NOTE — TELEPHONE ENCOUNTER
Called patient, patient used ibuprofen artificial tears. Eye pain is almost all gone. Unlikely to be GCA again as NSAIDs usually do not abrogate symptoms.

## 2021-07-02 NOTE — ED PROVIDER NOTES
Patient Seen in: [facility] Emergency Department      History   Patient presents with:   Eye Visual Problem    Stated Complaint: coming from [place] to rule out temporal arteritis    HPI/Subjective:   HPI    Patient is a 35-year-old female Headache(784.0)    • Hemiparesis ([address]    • Hip pain    • History of bone density study 10/2/2010   • HYPERTENSION NOS 8/8/2007   • Jaw pain 12/9/2014   • Knee pain 5/20/2011    mild patellar spurring   • Lateral epicondylitis     and medial    • LBP (low Social History    Tobacco Use      Smoking status: Never Smoker      Smokeless tobacco: Never Used    Vaping Use      Vaping Use: Never used    Alcohol use: Not Currently      Alcohol/week: 0.0 standard drinks    Drug use: Never             Review of following components:       Result Value    BUN 27 (*)     BUN/CREA Ratio 49.1 (*)     All other components within normal limits   SED RATE, WESTERGREN (AUTOMATED) - Normal   C-REACTIVE PROTEIN - Normal   CBC WITH DIFFERENTIAL WITH PLATELET    Narrative: evaluation yielded no source for her pain. Head CT obtained which was negative. Inflammatory markers were normal.  Case discussed with rheumatology, Dr. Cait Valente who will follow up patient tomorrow.   Patient was advised to expect a call from the office bernice

## 2021-07-06 ENCOUNTER — OFFICE VISIT (OUTPATIENT)
Dept: PHYSICAL THERAPY | Facility: HOSPITAL | Age: 66
End: 2021-07-06
Attending: INTERNAL MEDICINE
Payer: MEDICARE

## 2021-07-06 PROCEDURE — 97140 MANUAL THERAPY 1/> REGIONS: CPT

## 2021-07-06 PROCEDURE — 97110 THERAPEUTIC EXERCISES: CPT

## 2021-07-06 NOTE — PROGRESS NOTES
Dx: Left neck pain         Insurance (Authorized # of Visits):  25          Authorizing Physician: Dr. Pabon ref.  provider found  Next MD visit: none scheduled  Fall Risk: standard         Precautions: n/a             Subjective: Patient reports that she was with trigger point target  3x10  Cervical mob x 5'   TE  Protraction / Retraction 2x10  Chin tuck with extension 2x10  Row green tube band 3x15 TE  T&M x 8'  Rowing blue TB 3x15x3 sec  Shoulder extension red TB 3x12x2 sec  Lat pull down red TB 3x15 TE  SL

## 2021-07-09 ENCOUNTER — OFFICE VISIT (OUTPATIENT)
Dept: INTERNAL MEDICINE CLINIC | Facility: CLINIC | Age: 66
End: 2021-07-09
Payer: MEDICARE

## 2021-07-09 ENCOUNTER — OFFICE VISIT (OUTPATIENT)
Dept: PHYSICAL THERAPY | Facility: HOSPITAL | Age: 66
End: 2021-07-09
Attending: INTERNAL MEDICINE
Payer: MEDICARE

## 2021-07-09 VITALS
BODY MASS INDEX: 21 KG/M2 | RESPIRATION RATE: 16 BRPM | WEIGHT: 123 LBS | HEART RATE: 72 BPM | HEIGHT: 64 IN | TEMPERATURE: 97 F | DIASTOLIC BLOOD PRESSURE: 62 MMHG | SYSTOLIC BLOOD PRESSURE: 102 MMHG

## 2021-07-09 DIAGNOSIS — H57.11 PAIN OF RIGHT EYE: ICD-10-CM

## 2021-07-09 DIAGNOSIS — R51.9 ACUTE NONINTRACTABLE HEADACHE, UNSPECIFIED HEADACHE TYPE: Primary | ICD-10-CM

## 2021-07-09 PROCEDURE — 97110 THERAPEUTIC EXERCISES: CPT

## 2021-07-09 PROCEDURE — 99213 OFFICE O/P EST LOW 20 MIN: CPT | Performed by: INTERNAL MEDICINE

## 2021-07-09 PROCEDURE — 97140 MANUAL THERAPY 1/> REGIONS: CPT

## 2021-07-09 NOTE — PROGRESS NOTES
Patient presents with:  ER F/U: DD Rm 9, ER F/U 7/1/21 Pain of left eye, no complaints.  Throat tightness      HPI:  Here for ER f/u from headache and right eye pain, went to eye dr, concerned for temporal arteritis, sent to ER, w/u neg there, ibuprofen cau Lateral epicondylitis     and medial    • LBP (low back pain)    • Leg pain     LE pain and weakness   • Lumbar foraminal stenosis    • Lumbar sprain    • Memory deficit    • Neck pain    • Night sweats     and chills   • Nocturia    • Oligomenorrhea    • each 2   • Polyvinyl Alcohol (LUBRICANT DROPS OP) Apply to eye daily. • omega-3 fatty acids 1000 MG Oral Cap Take 1,000 mg by mouth 2 (two) times daily.      • Multiple Vitamins-Minerals (WOMENS MULTIVITAMIN PLUS) Oral Tab Take 1 tablet by mouth 2 (two) answered and the patient understands the plan.

## 2021-07-09 NOTE — PROGRESS NOTES
Dx: Left neck pain         Insurance (Authorized # of Visits):  25          Authorizing Physician: Dr. Pabon ref.  provider found  Next MD visit: none scheduled  Fall Risk: standard         Precautions: n/a             Subjective: Patient reports that her degr mob x 5' Manual  Trigger point release mid/low trap 3x90 sec  STM scapular and posterior cuff x 8'  Cervical mob left upper x 8'   TE  Protraction / Retraction 2x10  Chin tuck with extension 2x10  Row green tube band 3x15 TE  T&M x 8'  Rowing blue TB 3x15x

## 2021-07-13 ENCOUNTER — OFFICE VISIT (OUTPATIENT)
Dept: PHYSICAL THERAPY | Facility: HOSPITAL | Age: 66
End: 2021-07-13
Attending: INTERNAL MEDICINE
Payer: MEDICARE

## 2021-07-13 PROCEDURE — 97110 THERAPEUTIC EXERCISES: CPT

## 2021-07-13 PROCEDURE — 97140 MANUAL THERAPY 1/> REGIONS: CPT

## 2021-07-13 NOTE — PROGRESS NOTES
Dx: Left neck pain         Insurance (Authorized # of Visits):  25          Authorizing Physician: Dr. Pabon ref.  provider found  Next MD visit: none scheduled  Fall Risk: standard         Precautions: n/a             Subjective: Patient reports having signif cuff x 8'  Cervical mob left upper x 8' Manual  Trigger point release mid/low trap 3x90 sec  STM scapular and posterior cuff x 10'  Cervical mob left upper x 8'   TE  SL ER 1# 3x12  Upper trap stretch 10x5 sec  Snow chente on wall 1# 3x10  Shoulder hiking g

## 2021-07-15 ENCOUNTER — OFFICE VISIT (OUTPATIENT)
Dept: PHYSICAL THERAPY | Facility: HOSPITAL | Age: 66
End: 2021-07-15
Attending: INTERNAL MEDICINE
Payer: MEDICARE

## 2021-07-15 PROCEDURE — 97140 MANUAL THERAPY 1/> REGIONS: CPT

## 2021-07-15 PROCEDURE — 97110 THERAPEUTIC EXERCISES: CPT

## 2021-07-15 NOTE — PROGRESS NOTES
Dx: Left neck pain         Insurance (Authorized # of Visits):  25          Authorizing Physician: Dr. Pabon ref.  provider found  Next MD visit: none scheduled  Fall Risk: standard         Precautions: n/a             Subjective: Patient reports that dressing 8'  Cervical mob left upper x 8' Manual  Trigger point release mid/low trap 3x90 sec  STM scapular and posterior cuff x 10'  Cervical mob left upper x 8' Manual  Trigger point release- infra 3x90 sec  STM upper, mid, low trap x 5'  Physiological rotation a

## 2021-07-19 ENCOUNTER — OFFICE VISIT (OUTPATIENT)
Dept: PHYSICAL THERAPY | Facility: HOSPITAL | Age: 66
End: 2021-07-19
Attending: INTERNAL MEDICINE
Payer: MEDICARE

## 2021-07-19 PROCEDURE — 97110 THERAPEUTIC EXERCISES: CPT

## 2021-07-19 PROCEDURE — 97140 MANUAL THERAPY 1/> REGIONS: CPT

## 2021-07-19 NOTE — PROGRESS NOTES
Dx: Left neck pain         Insurance (Authorized # of Visits):  25          Authorizing Physician: Dr. Pabon ref.  provider found  Next MD visit: none scheduled  Fall Risk: standard         Precautions: n/a             Subjective: Patient reports that she is c point target  3x10  Cervical mob x 5' Manual  Trigger point release mid/low trap 3x90 sec  STM scapular and posterior cuff x 8'  Cervical mob left upper x 8' Manual  Trigger point release mid/low trap 3x90 sec  STM scapular and posterior cuff x 10'  Cervic

## 2021-07-26 ENCOUNTER — OFFICE VISIT (OUTPATIENT)
Dept: PHYSICAL THERAPY | Facility: HOSPITAL | Age: 66
End: 2021-07-26
Attending: INTERNAL MEDICINE
Payer: MEDICARE

## 2021-07-26 PROCEDURE — 97140 MANUAL THERAPY 1/> REGIONS: CPT

## 2021-07-26 PROCEDURE — 97110 THERAPEUTIC EXERCISES: CPT

## 2021-07-26 NOTE — PROGRESS NOTES
Dx: Left neck pain         Insurance (Authorized # of Visits):  25          Authorizing Physician: Dr. Pabon ref.  provider found  Next MD visit: none scheduled  Fall Risk: standard         Precautions: n/a             Kamar  Pt has attended 18 visi actively participate in planning and for this course of care. Thank you for your referral. If you have any questions, please contact me at Dept: [phone].     Sincerely,  Electronically signed by therapist: Tommy Cueva     Physician's certification r down; shoulder extension  Quadruped serratus punch; quadruped arm raise; seated upper trap stretch with isometric; trigger point release    Charges: Manual x 1; TE x 2      Total Timed Treatment: 40 min  Total Treatment Time: 40 min

## 2021-07-30 ENCOUNTER — OFFICE VISIT (OUTPATIENT)
Dept: RHEUMATOLOGY | Facility: CLINIC | Age: 66
End: 2021-07-30
Payer: MEDICARE

## 2021-07-30 VITALS
RESPIRATION RATE: 18 BRPM | HEART RATE: 67 BPM | HEIGHT: 64 IN | DIASTOLIC BLOOD PRESSURE: 68 MMHG | WEIGHT: 120 LBS | SYSTOLIC BLOOD PRESSURE: 108 MMHG | BODY MASS INDEX: 20.49 KG/M2

## 2021-07-30 DIAGNOSIS — R76.8 POSITIVE ANA (ANTINUCLEAR ANTIBODY): ICD-10-CM

## 2021-07-30 DIAGNOSIS — M35.00 SICCA SYNDROME (HCC): Primary | ICD-10-CM

## 2021-07-30 DIAGNOSIS — M19.042 PRIMARY OSTEOARTHRITIS OF BOTH HANDS: ICD-10-CM

## 2021-07-30 DIAGNOSIS — M26.609 TMJ (TEMPOROMANDIBULAR JOINT DISORDER): ICD-10-CM

## 2021-07-30 DIAGNOSIS — M19.041 PRIMARY OSTEOARTHRITIS OF BOTH HANDS: ICD-10-CM

## 2021-07-30 DIAGNOSIS — R51.9 NONINTRACTABLE HEADACHE, UNSPECIFIED CHRONICITY PATTERN, UNSPECIFIED HEADACHE TYPE: ICD-10-CM

## 2021-07-30 DIAGNOSIS — R76.8 RHEUMATOID FACTOR POSITIVE: ICD-10-CM

## 2021-07-30 PROCEDURE — 99214 OFFICE O/P EST MOD 30 MIN: CPT | Performed by: INTERNAL MEDICINE
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
## 2021-09-13 ENCOUNTER — TELEPHONE (OUTPATIENT)
Dept: INTERNAL MEDICINE CLINIC | Facility: CLINIC | Age: 66
End: 2021-09-13

## 2021-09-13 NOTE — TELEPHONE ENCOUNTER
Pt has medicare and she would like to know can she get her flu vacc prior to her Last one was which was 10/13/20.  Please advise

## 2021-10-08 ENCOUNTER — OFFICE VISIT (OUTPATIENT)
Dept: INTERNAL MEDICINE CLINIC | Facility: CLINIC | Age: 66
End: 2021-10-08
Payer: MEDICARE

## 2021-10-08 VITALS
WEIGHT: 120 LBS | RESPIRATION RATE: 16 BRPM | DIASTOLIC BLOOD PRESSURE: 84 MMHG | HEART RATE: 77 BPM | HEIGHT: 64 IN | TEMPERATURE: 98 F | BODY MASS INDEX: 20.49 KG/M2 | SYSTOLIC BLOOD PRESSURE: 116 MMHG

## 2021-10-08 DIAGNOSIS — M48.061 LUMBAR FORAMINAL STENOSIS: ICD-10-CM

## 2021-10-08 DIAGNOSIS — Z12.11 SCREEN FOR COLON CANCER: ICD-10-CM

## 2021-10-08 DIAGNOSIS — M19.042 PRIMARY OSTEOARTHRITIS OF BOTH HANDS: ICD-10-CM

## 2021-10-08 DIAGNOSIS — M85.89 OSTEOPENIA OF MULTIPLE SITES: ICD-10-CM

## 2021-10-08 DIAGNOSIS — Z12.31 VISIT FOR SCREENING MAMMOGRAM: Primary | ICD-10-CM

## 2021-10-08 DIAGNOSIS — E78.2 MIXED HYPERLIPIDEMIA: ICD-10-CM

## 2021-10-08 DIAGNOSIS — D25.9 UTERINE LEIOMYOMA, UNSPECIFIED LOCATION: ICD-10-CM

## 2021-10-08 DIAGNOSIS — H04.129 DRY EYE: ICD-10-CM

## 2021-10-08 DIAGNOSIS — Z78.0 POST-MENOPAUSAL: ICD-10-CM

## 2021-10-08 DIAGNOSIS — Z00.00 ENCOUNTER FOR ANNUAL HEALTH EXAMINATION: ICD-10-CM

## 2021-10-08 DIAGNOSIS — M19.041 PRIMARY OSTEOARTHRITIS OF BOTH HANDS: ICD-10-CM

## 2021-10-08 DIAGNOSIS — N63.0 BREAST NODULE: ICD-10-CM

## 2021-10-08 PROBLEM — R76.8 POSITIVE ANA (ANTINUCLEAR ANTIBODY): Status: RESOLVED | Noted: 2019-02-06 | Resolved: 2021-10-08

## 2021-10-08 PROBLEM — R76.8 RHEUMATOID FACTOR POSITIVE: Status: RESOLVED | Noted: 2019-02-06 | Resolved: 2021-10-08

## 2021-10-08 PROBLEM — M35.00 SICCA SYNDROME (HCC): Status: RESOLVED | Noted: 2019-02-06 | Resolved: 2021-10-08

## 2021-10-08 PROBLEM — R51.9 NONINTRACTABLE HEADACHE: Status: RESOLVED | Noted: 2021-07-02 | Resolved: 2021-10-08

## 2021-10-08 PROCEDURE — 90670 PCV13 VACCINE IM: CPT | Performed by: INTERNAL MEDICINE

## 2021-10-08 PROCEDURE — G0438 PPPS, INITIAL VISIT: HCPCS | Performed by: INTERNAL MEDICINE

## 2021-10-08 PROCEDURE — G0009 ADMIN PNEUMOCOCCAL VACCINE: HCPCS | Performed by: INTERNAL MEDICINE

## 2021-10-08 RX ORDER — ERYTHROMYCIN 5 MG/G
OINTMENT OPHTHALMIC
COMMUNITY
Start: 2021-08-04

## 2021-10-08 NOTE — PROGRESS NOTES
HPI:   Enma Carrasco is a 77year old female who presents for a Medicare Subsequent Annual Wellness visit (Pt already had Initial Annual Wellness). Here for awv. Pt has stable chronic issues.    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), Atypical ductal hyperplasia of breast (3/17/2008), Bloating, special service or report; breast surgery procedure unlisted (1999, 3/17/2008, 10/16/2009); stereotactic breast biopsy (2/25/2008); chinmay localization wire 1 site left (cpt=19281) (2008); chinmay localization wire 1 site right (cpt=19281) (1999); and needle biop septum midline,mucosa normal, no drainage or sinus tenderness   Throat: Lips, mucosa, and tongue normal; teeth and gums normal   Neck: Supple, symmetrical, trachea midline, no adenopathy;  thyroid: not enlarged, symmetric, no tenderness/mass/nodules; no ca El Almanzar is a 77year old female who presents for a Medicare Assessment. PLAN SUMMARY:   Diagnoses and all orders for this visit:    Visit for screening mammogram  -     [facility] KALI 2D+3D SCREENING BILAT (CPT=77067/83858);  Future    Screen for colo months if never tested or if previously tested but not diagnosed with pre-diabetes   One screening every 6 months if diagnosed with pre-diabetes Lab Results   Component Value Date    GLU 97 07/01/2021        Cardiovascular Disease Screening    Lipid Panel aged 35-39 11/10/2020    Mammogram due on 11/10/2021    Immunizations    Influenza Covered once per flu season  Please get every year 10/14/2020  Influenza Vaccine(1) due on 10/01/2021    Pneumococcal Each vaccine ([id number] & [id number]) covered once aft

## 2021-10-08 NOTE — PATIENT INSTRUCTIONS
Vonnie Colbert's SCREENING SCHEDULE   Tests on this list are recommended by your physician but may not be covered, or covered at this frequency, by your insurer. Please check with your insurance carrier before scheduling to verify coverage.    PREVENTATI 09/05/2019      No recommendations at this time   Pap and Pelvic    Pap   Covered every 2 years for women at normal risk;  Annually if at high risk -  No recommendations at this time    Chlamydia Annually if high risk -  No recommendations at this time   Sc regarding Advance Directives.

## 2021-10-21 ENCOUNTER — HOSPITAL ENCOUNTER (OUTPATIENT)
Dept: BONE DENSITY | Age: 66
Discharge: HOME | End: 2021-10-21
Attending: INTERNAL MEDICINE
Payer: MEDICARE

## 2021-10-21 DIAGNOSIS — Z78.0 POST-MENOPAUSAL: ICD-10-CM

## 2021-10-21 PROCEDURE — 77080 DXA BONE DENSITY AXIAL: CPT | Performed by: INTERNAL MEDICINE

## 2021-11-08 ENCOUNTER — LAB ENCOUNTER (OUTPATIENT)
Dept: LAB | Age: 66
End: 2021-11-08
Attending: INTERNAL MEDICINE
Payer: MEDICARE

## 2021-11-08 DIAGNOSIS — R79.89 ELEVATED TSH: ICD-10-CM

## 2021-11-08 PROCEDURE — 36415 COLL VENOUS BLD VENIPUNCTURE: CPT

## 2021-11-08 PROCEDURE — 84443 ASSAY THYROID STIM HORMONE: CPT

## 2021-11-08 PROCEDURE — 84439 ASSAY OF FREE THYROXINE: CPT

## 2021-11-11 ENCOUNTER — TELEPHONE (OUTPATIENT)
Dept: INTERNAL MEDICINE CLINIC | Facility: CLINIC | Age: 66
End: 2021-11-11

## 2021-11-11 NOTE — TELEPHONE ENCOUNTER
Patient reviewed her supplements, MVI has Biotin 15mg so she will stop the MVI 1-2 weeks before repeating her Thyroid labs. Pt verbalizes understanding. FYI to AS. (see result note)

## 2021-11-30 ENCOUNTER — HOSPITAL ENCOUNTER (OUTPATIENT)
Dept: MAMMOGRAPHY | Age: 66
Discharge: HOME | End: 2021-11-30
Attending: INTERNAL MEDICINE
Payer: MEDICARE

## 2021-11-30 DIAGNOSIS — Z12.31 VISIT FOR SCREENING MAMMOGRAM: ICD-10-CM

## 2021-11-30 PROCEDURE — 77063 BREAST TOMOSYNTHESIS BI: CPT | Performed by: INTERNAL MEDICINE

## 2021-11-30 PROCEDURE — 77067 SCR MAMMO BI INCL CAD: CPT | Performed by: INTERNAL MEDICINE

## 2022-03-10 ENCOUNTER — TELEPHONE (OUTPATIENT)
Dept: INTERNAL MEDICINE CLINIC | Facility: CLINIC | Age: 67
End: 2022-03-10

## 2022-03-10 DIAGNOSIS — E78.2 MIXED HYPERLIPIDEMIA: Primary | ICD-10-CM

## 2022-03-10 DIAGNOSIS — E03.8 SUBCLINICAL HYPOTHYROIDISM: ICD-10-CM

## 2022-03-10 NOTE — TELEPHONE ENCOUNTER
Future Appointments   Date Time Provider Doyle Hooksi   10/12/2022 10:30 AM Fermin Lebron MD [facility]     Patient is scheduled for Annual Physical.  Please place orders with THE [facility]. Patient aware to fast.  No call back required. Patient informed that labs need to be completed no sooner than 2 weeks prior to the appointment.

## 2022-04-04 ENCOUNTER — TELEPHONE (OUTPATIENT)
Dept: INTERNAL MEDICINE CLINIC | Facility: CLINIC | Age: 67
End: 2022-04-04

## 2022-04-04 DIAGNOSIS — R79.89 TSH ELEVATION: Primary | ICD-10-CM

## 2022-04-04 NOTE — TELEPHONE ENCOUNTER
Per AS result note from 11/9/21, \"Tsh is still borderline. I would not treat at this point unless symptoms, please screen for hypothyoird symptoms. Rpt tsh and t4 in 6 mos\" . Order placed.

## 2022-05-16 ENCOUNTER — TELEPHONE (OUTPATIENT)
Dept: INTERNAL MEDICINE CLINIC | Facility: CLINIC | Age: 67
End: 2022-05-16

## 2022-05-16 DIAGNOSIS — R73.03 PRE-DIABETES: Primary | ICD-10-CM

## 2022-05-16 NOTE — TELEPHONE ENCOUNTER
Pt called asking if she is due for her A1C?  Last one was 4/21-pt not due for wellness until 10/22-call to advise

## 2022-05-19 NOTE — TELEPHONE ENCOUNTER
A1c order placed. Pt informed can complete at her convenience. Pt stated understanding and was thankful.

## 2022-05-23 ENCOUNTER — LAB ENCOUNTER (OUTPATIENT)
Dept: LAB | Age: 67
End: 2022-05-23
Attending: INTERNAL MEDICINE
Payer: MEDICARE

## 2022-05-23 DIAGNOSIS — R79.89 TSH ELEVATION: ICD-10-CM

## 2022-05-23 DIAGNOSIS — R73.03 PRE-DIABETES: ICD-10-CM

## 2022-05-23 LAB
EST. AVERAGE GLUCOSE BLD GHB EST-MCNC: 111 MG/DL (ref 68–126)
HBA1C MFR BLD: 5.5 % (ref ?–5.7)
T4 FREE SERPL-MCNC: 0.8 NG/DL (ref 0.8–1.7)
TSI SER-ACNC: 4.77 MIU/ML (ref 0.36–3.74)

## 2022-05-23 PROCEDURE — 36415 COLL VENOUS BLD VENIPUNCTURE: CPT

## 2022-05-23 PROCEDURE — 83036 HEMOGLOBIN GLYCOSYLATED A1C: CPT

## 2022-05-23 PROCEDURE — 84443 ASSAY THYROID STIM HORMONE: CPT

## 2022-05-23 PROCEDURE — 84439 ASSAY OF FREE THYROXINE: CPT

## 2022-05-24 DIAGNOSIS — R79.89 ELEVATED TSH: Primary | ICD-10-CM

## 2022-07-12 ENCOUNTER — HOSPITAL ENCOUNTER (OUTPATIENT)
Facility: HOSPITAL | Age: 67
Setting detail: OBSERVATION
Discharge: HOME | End: 2022-07-13
Attending: EMERGENCY MEDICINE | Admitting: INTERNAL MEDICINE
Payer: MEDICARE

## 2022-07-12 DIAGNOSIS — E87.1 HYPONATREMIA: Primary | ICD-10-CM

## 2022-07-12 DIAGNOSIS — R94.31 ABNORMAL ECG: ICD-10-CM

## 2022-07-12 DIAGNOSIS — R55 SYNCOPE, NEAR: ICD-10-CM

## 2022-07-12 PROBLEM — R73.9 HYPERGLYCEMIA: Status: ACTIVE | Noted: 2022-07-12

## 2022-07-12 PROBLEM — D69.6 THROMBOCYTOPENIA: Status: ACTIVE | Noted: 2022-07-12

## 2022-07-12 PROBLEM — D69.6 THROMBOCYTOPENIA (HCC): Status: ACTIVE | Noted: 2022-07-12

## 2022-07-12 LAB
ALBUMIN SERPL-MCNC: 4 G/DL (ref 3.4–5)
ALBUMIN/GLOB SERPL: 1.3 {RATIO} (ref 1–2)
ALP LIVER SERPL-CCNC: 67 U/L
ALT SERPL-CCNC: 24 U/L
ANION GAP SERPL CALC-SCNC: 9 MMOL/L (ref 0–18)
AST SERPL-CCNC: 21 U/L (ref 15–37)
BASOPHILS # BLD AUTO: 0.01 X10(3) UL (ref 0–0.2)
BASOPHILS NFR BLD AUTO: 0.1 %
BILIRUB SERPL-MCNC: 0.9 MG/DL (ref 0.1–2)
BUN BLD-MCNC: 8 MG/DL (ref 7–18)
CALCIUM BLD-MCNC: 8.8 MG/DL (ref 8.5–10.1)
CHLORIDE SERPL-SCNC: 87 MMOL/L (ref 98–112)
CO2 SERPL-SCNC: 26 MMOL/L (ref 21–32)
CREAT BLD-MCNC: 0.5 MG/DL
EOSINOPHIL # BLD AUTO: 0 X10(3) UL (ref 0–0.7)
EOSINOPHIL NFR BLD AUTO: 0 %
ERYTHROCYTE [DISTWIDTH] IN BLOOD BY AUTOMATED COUNT: 12.5 %
GLOBULIN PLAS-MCNC: 3.2 G/DL (ref 2.8–4.4)
GLUCOSE BLD-MCNC: 156 MG/DL (ref 70–99)
GLUCOSE BLD-MCNC: 193 MG/DL (ref 70–99)
HCT VFR BLD AUTO: 36.2 %
HGB BLD-MCNC: 12.6 G/DL
IMM GRANULOCYTES # BLD AUTO: 0.04 X10(3) UL (ref 0–1)
IMM GRANULOCYTES NFR BLD: 0.4 %
LIPASE SERPL-CCNC: 89 U/L (ref 73–393)
LYMPHOCYTES # BLD AUTO: 0.98 X10(3) UL (ref 1–4)
LYMPHOCYTES NFR BLD AUTO: 10.2 %
MCH RBC QN AUTO: 29.9 PG (ref 26–34)
MCHC RBC AUTO-ENTMCNC: 34.8 G/DL (ref 31–37)
MCV RBC AUTO: 85.8 FL
MONOCYTES # BLD AUTO: 0.41 X10(3) UL (ref 0.1–1)
MONOCYTES NFR BLD AUTO: 4.3 %
NEUTROPHILS # BLD AUTO: 8.19 X10 (3) UL (ref 1.5–7.7)
NEUTROPHILS # BLD AUTO: 8.19 X10(3) UL (ref 1.5–7.7)
NEUTROPHILS NFR BLD AUTO: 85 %
OSMOLALITY SERPL CALC.SUM OF ELEC: 258 MOSM/KG (ref 275–295)
PLATELET # BLD AUTO: 130 10(3)UL (ref 150–450)
POTASSIUM SERPL-SCNC: 3.7 MMOL/L (ref 3.5–5.1)
PROT SERPL-MCNC: 7.2 G/DL (ref 6.4–8.2)
RBC # BLD AUTO: 4.22 X10(6)UL
SARS-COV-2 RNA RESP QL NAA+PROBE: NOT DETECTED
SODIUM SERPL-SCNC: 122 MMOL/L (ref 136–145)
TROPONIN I HIGH SENSITIVITY: 6 NG/L
WBC # BLD AUTO: 9.6 X10(3) UL (ref 4–11)

## 2022-07-12 PROCEDURE — 99285 EMERGENCY DEPT VISIT HI MDM: CPT

## 2022-07-12 PROCEDURE — 93005 ELECTROCARDIOGRAM TRACING: CPT

## 2022-07-12 PROCEDURE — 96360 HYDRATION IV INFUSION INIT: CPT

## 2022-07-12 PROCEDURE — 84484 ASSAY OF TROPONIN QUANT: CPT | Performed by: EMERGENCY MEDICINE

## 2022-07-12 PROCEDURE — 83690 ASSAY OF LIPASE: CPT | Performed by: EMERGENCY MEDICINE

## 2022-07-12 PROCEDURE — 96374 THER/PROPH/DIAG INJ IV PUSH: CPT

## 2022-07-12 PROCEDURE — 96375 TX/PRO/DX INJ NEW DRUG ADDON: CPT

## 2022-07-12 PROCEDURE — 82962 GLUCOSE BLOOD TEST: CPT

## 2022-07-12 PROCEDURE — 93010 ELECTROCARDIOGRAM REPORT: CPT

## 2022-07-12 PROCEDURE — 80053 COMPREHEN METABOLIC PANEL: CPT | Performed by: EMERGENCY MEDICINE

## 2022-07-12 PROCEDURE — 85025 COMPLETE CBC W/AUTO DIFF WBC: CPT | Performed by: EMERGENCY MEDICINE

## 2022-07-12 RX ORDER — ONDANSETRON 2 MG/ML
4 INJECTION INTRAMUSCULAR; INTRAVENOUS ONCE
Status: COMPLETED | OUTPATIENT
Start: 2022-07-12 | End: 2022-07-12

## 2022-07-12 RX ORDER — ONDANSETRON 2 MG/ML
4 INJECTION INTRAMUSCULAR; INTRAVENOUS ONCE
Status: DISCONTINUED | OUTPATIENT
Start: 2022-07-12 | End: 2022-07-12

## 2022-07-12 NOTE — ED INITIAL ASSESSMENT (HPI)
Abdominal pain ,nausea , vomiting since 1 hour sweaty mild shortness of breath. Patient is on preparation for colonoscopy tomorrow.

## 2022-07-13 ENCOUNTER — APPOINTMENT (OUTPATIENT)
Dept: CV DIAGNOSTICS | Facility: HOSPITAL | Age: 67
End: 2022-07-13
Attending: INTERNAL MEDICINE
Payer: MEDICARE

## 2022-07-13 VITALS
HEIGHT: 64 IN | DIASTOLIC BLOOD PRESSURE: 56 MMHG | SYSTOLIC BLOOD PRESSURE: 124 MMHG | BODY MASS INDEX: 19.97 KG/M2 | RESPIRATION RATE: 14 BRPM | OXYGEN SATURATION: 97 % | WEIGHT: 117 LBS | TEMPERATURE: 99 F | HEART RATE: 77 BPM

## 2022-07-13 PROBLEM — R94.31 ABNORMAL ECG: Status: ACTIVE | Noted: 2022-07-13

## 2022-07-13 PROBLEM — R55 SYNCOPE, NEAR: Status: ACTIVE | Noted: 2022-07-13

## 2022-07-13 LAB
ANION GAP SERPL CALC-SCNC: 5 MMOL/L (ref 0–18)
ATRIAL RATE: 75 BPM
BASOPHILS # BLD AUTO: 0.01 X10(3) UL (ref 0–0.2)
BASOPHILS NFR BLD AUTO: 0.2 %
BILIRUB UR QL STRIP.AUTO: NEGATIVE
BUN BLD-MCNC: 5 MG/DL (ref 7–18)
CALCIUM BLD-MCNC: 9.2 MG/DL (ref 8.5–10.1)
CHLORIDE SERPL-SCNC: 99 MMOL/L (ref 98–112)
CLARITY UR REFRACT.AUTO: CLEAR
CO2 SERPL-SCNC: 27 MMOL/L (ref 21–32)
CREAT BLD-MCNC: 0.48 MG/DL
EOSINOPHIL # BLD AUTO: 0 X10(3) UL (ref 0–0.7)
EOSINOPHIL NFR BLD AUTO: 0 %
ERYTHROCYTE [DISTWIDTH] IN BLOOD BY AUTOMATED COUNT: 12.6 %
GLUCOSE BLD-MCNC: 101 MG/DL (ref 70–99)
GLUCOSE BLD-MCNC: 116 MG/DL (ref 70–99)
GLUCOSE BLD-MCNC: 122 MG/DL (ref 70–99)
GLUCOSE BLD-MCNC: 150 MG/DL (ref 70–99)
GLUCOSE UR STRIP.AUTO-MCNC: 150 MG/DL
HCT VFR BLD AUTO: 40 %
HGB BLD-MCNC: 13.5 G/DL
IMM GRANULOCYTES # BLD AUTO: 0.02 X10(3) UL (ref 0–1)
IMM GRANULOCYTES NFR BLD: 0.3 %
LEUKOCYTE ESTERASE UR QL STRIP.AUTO: NEGATIVE
LYMPHOCYTES # BLD AUTO: 1.07 X10(3) UL (ref 1–4)
LYMPHOCYTES NFR BLD AUTO: 16.2 %
MCH RBC QN AUTO: 29.4 PG (ref 26–34)
MCHC RBC AUTO-ENTMCNC: 33.8 G/DL (ref 31–37)
MCV RBC AUTO: 87.1 FL
MONOCYTES # BLD AUTO: 0.28 X10(3) UL (ref 0.1–1)
MONOCYTES NFR BLD AUTO: 4.2 %
NEUTROPHILS # BLD AUTO: 5.21 X10 (3) UL (ref 1.5–7.7)
NEUTROPHILS # BLD AUTO: 5.21 X10(3) UL (ref 1.5–7.7)
NEUTROPHILS NFR BLD AUTO: 79.1 %
NITRITE UR QL STRIP.AUTO: NEGATIVE
OSMOLALITY SERPL CALC.SUM OF ELEC: 271 MOSM/KG (ref 275–295)
P AXIS: 52 DEGREES
P-R INTERVAL: 164 MS
PH UR STRIP.AUTO: 9 [PH] (ref 5–8)
PLATELET # BLD AUTO: 151 10(3)UL (ref 150–450)
POTASSIUM SERPL-SCNC: 4.1 MMOL/L (ref 3.5–5.1)
PROT UR STRIP.AUTO-MCNC: NEGATIVE MG/DL
Q-T INTERVAL: 442 MS
QRS DURATION: 102 MS
QTC CALCULATION (BEZET): 493 MS
R AXIS: 5 DEGREES
RBC # BLD AUTO: 4.59 X10(6)UL
RBC UR QL AUTO: NEGATIVE
SODIUM SERPL-SCNC: 131 MMOL/L (ref 136–145)
SP GR UR STRIP.AUTO: 1.01 (ref 1–1.03)
T AXIS: -75 DEGREES
UROBILINOGEN UR STRIP.AUTO-MCNC: <2 MG/DL
VENTRICULAR RATE: 75 BPM
WBC # BLD AUTO: 6.6 X10(3) UL (ref 4–11)

## 2022-07-13 PROCEDURE — 93306 TTE W/DOPPLER COMPLETE: CPT | Performed by: INTERNAL MEDICINE

## 2022-07-13 PROCEDURE — 82962 GLUCOSE BLOOD TEST: CPT

## 2022-07-13 PROCEDURE — 80048 BASIC METABOLIC PNL TOTAL CA: CPT | Performed by: HOSPITALIST

## 2022-07-13 PROCEDURE — 81001 URINALYSIS AUTO W/SCOPE: CPT | Performed by: EMERGENCY MEDICINE

## 2022-07-13 PROCEDURE — 85025 COMPLETE CBC W/AUTO DIFF WBC: CPT | Performed by: HOSPITALIST

## 2022-07-13 RX ORDER — ACETAMINOPHEN 500 MG
500 TABLET ORAL EVERY 4 HOURS PRN
Status: DISCONTINUED | OUTPATIENT
Start: 2022-07-13 | End: 2022-07-13

## 2022-07-13 RX ORDER — METOCLOPRAMIDE HYDROCHLORIDE 5 MG/ML
10 INJECTION INTRAMUSCULAR; INTRAVENOUS EVERY 8 HOURS PRN
Status: DISCONTINUED | OUTPATIENT
Start: 2022-07-13 | End: 2022-07-13

## 2022-07-13 RX ORDER — DEXTROSE MONOHYDRATE 25 G/50ML
50 INJECTION, SOLUTION INTRAVENOUS
Status: DISCONTINUED | OUTPATIENT
Start: 2022-07-13 | End: 2022-07-13

## 2022-07-13 RX ORDER — ONDANSETRON 2 MG/ML
4 INJECTION INTRAMUSCULAR; INTRAVENOUS EVERY 6 HOURS PRN
Status: DISCONTINUED | OUTPATIENT
Start: 2022-07-13 | End: 2022-07-13

## 2022-07-13 RX ORDER — KETOROLAC TROMETHAMINE 15 MG/ML
15 INJECTION, SOLUTION INTRAMUSCULAR; INTRAVENOUS ONCE
Status: COMPLETED | OUTPATIENT
Start: 2022-07-13 | End: 2022-07-13

## 2022-07-13 RX ORDER — PSEUDOEPHEDRINE HCL 60 MG/1
60 TABLET ORAL EVERY 4 HOURS PRN
Status: DISCONTINUED | OUTPATIENT
Start: 2022-07-13 | End: 2022-07-13

## 2022-07-13 RX ORDER — MELATONIN
3 NIGHTLY PRN
Status: DISCONTINUED | OUTPATIENT
Start: 2022-07-13 | End: 2022-07-13

## 2022-07-13 RX ORDER — NICOTINE POLACRILEX 4 MG
30 LOZENGE BUCCAL
Status: DISCONTINUED | OUTPATIENT
Start: 2022-07-13 | End: 2022-07-13

## 2022-07-13 RX ORDER — NICOTINE POLACRILEX 4 MG
15 LOZENGE BUCCAL
Status: DISCONTINUED | OUTPATIENT
Start: 2022-07-13 | End: 2022-07-13

## 2022-07-13 RX ORDER — SODIUM CHLORIDE 9 MG/ML
INJECTION, SOLUTION INTRAVENOUS CONTINUOUS
Status: DISCONTINUED | OUTPATIENT
Start: 2022-07-13 | End: 2022-07-13

## 2022-07-13 NOTE — PLAN OF CARE
Assumed care at 1930  Pt drowsy but orientated x4, RA, VSS  Denies abd pain, n/v  No diarrhea overnight  Tylenol given for HA  IVF infusing  Will continue to monitor

## 2022-07-13 NOTE — ED QUICK NOTES
Pt sleeping, she rouses easy. States pain to \"butt and face, around the mouth coming and going, a little better.  \" Pt in no acute distress

## 2022-07-13 NOTE — ED QUICK NOTES
Orders for admission, patient is aware of plan and ready to go upstairs.  Any questions, please call ED RN Edmundo Villanueva at extension [phone]    Patient Covid vaccination status: Fully vaccinated     COVID Test Ordered in ED: Rapid SARS-CoV-2 by PCR    COVID Suspicion at Admission: Low clinical suspicion for COVID    Running Infusions:  None    Mental Status/LOC at time of transport: A/Ox4    Other pertinent information:   CIWA score: N/A   NIH score:  N/A

## 2022-07-13 NOTE — PLAN OF CARE
Received pt at shift change. Pt axox4. C/o headache tylenol given. Md ordered sudafed and pt felt great relief. Son at bedside. All questions and concerns addressed, support given, will monitor.

## 2022-07-14 ENCOUNTER — PATIENT OUTREACH (OUTPATIENT)
Dept: CASE MANAGEMENT | Age: 67
End: 2022-07-14

## 2022-07-14 DIAGNOSIS — Z02.9 ENCOUNTERS FOR UNSPECIFIED ADMINISTRATIVE PURPOSE: ICD-10-CM

## 2022-07-14 PROCEDURE — 1111F DSCHRG MED/CURRENT MED MERGE: CPT

## 2022-07-18 ENCOUNTER — OFFICE VISIT (OUTPATIENT)
Dept: INTERNAL MEDICINE CLINIC | Facility: CLINIC | Age: 67
End: 2022-07-18
Payer: MEDICARE

## 2022-07-18 VITALS
BODY MASS INDEX: 20.32 KG/M2 | SYSTOLIC BLOOD PRESSURE: 116 MMHG | OXYGEN SATURATION: 97 % | HEART RATE: 94 BPM | DIASTOLIC BLOOD PRESSURE: 58 MMHG | HEIGHT: 64 IN | TEMPERATURE: 97 F | WEIGHT: 119 LBS

## 2022-07-18 DIAGNOSIS — D69.6 THROMBOCYTOPENIA (HCC): ICD-10-CM

## 2022-07-18 DIAGNOSIS — R55 SYNCOPE, NEAR: Primary | ICD-10-CM

## 2022-07-18 DIAGNOSIS — E87.1 HYPONATREMIA: ICD-10-CM

## 2022-07-18 DIAGNOSIS — R09.81 SINUS CONGESTION: ICD-10-CM

## 2022-07-18 RX ORDER — FLUTICASONE PROPIONATE 50 MCG
1 SPRAY, SUSPENSION (ML) NASAL 2 TIMES DAILY
Qty: 1 EACH | Refills: 1 | Status: SHIPPED | OUTPATIENT
Start: 2022-07-18

## 2022-07-18 RX ORDER — AZITHROMYCIN 250 MG/1
TABLET, FILM COATED ORAL
Qty: 6 TABLET | Refills: 0 | Status: SHIPPED | OUTPATIENT
Start: 2022-07-18 | End: 2022-07-23

## 2022-07-19 NOTE — PROGRESS NOTES
Multiple attempts to reach pt and messages left with no return call. Patient went in for HFU appt with PCP office on 7/18/22. Encounter closing.

## 2022-07-26 ENCOUNTER — LAB ENCOUNTER (OUTPATIENT)
Dept: LAB | Age: 67
End: 2022-07-26
Attending: NURSE PRACTITIONER
Payer: MEDICARE

## 2022-07-26 DIAGNOSIS — E87.1 HYPONATREMIA: ICD-10-CM

## 2022-07-26 LAB
ALBUMIN SERPL-MCNC: 3.8 G/DL (ref 3.4–5)
ALBUMIN/GLOB SERPL: 1.1 {RATIO} (ref 1–2)
ALP LIVER SERPL-CCNC: 82 U/L
ALT SERPL-CCNC: 27 U/L
ANION GAP SERPL CALC-SCNC: 2 MMOL/L (ref 0–18)
AST SERPL-CCNC: 19 U/L (ref 15–37)
BILIRUB SERPL-MCNC: 0.5 MG/DL (ref 0.1–2)
BUN BLD-MCNC: 17 MG/DL (ref 7–18)
CALCIUM BLD-MCNC: 9.4 MG/DL (ref 8.5–10.1)
CHLORIDE SERPL-SCNC: 107 MMOL/L (ref 98–112)
CO2 SERPL-SCNC: 29 MMOL/L (ref 21–32)
CREAT BLD-MCNC: 0.67 MG/DL
FASTING STATUS PATIENT QL REPORTED: YES
GLOBULIN PLAS-MCNC: 3.5 G/DL (ref 2.8–4.4)
GLUCOSE BLD-MCNC: 93 MG/DL (ref 70–99)
OSMOLALITY SERPL CALC.SUM OF ELEC: 287 MOSM/KG (ref 275–295)
POTASSIUM SERPL-SCNC: 4.3 MMOL/L (ref 3.5–5.1)
PROT SERPL-MCNC: 7.3 G/DL (ref 6.4–8.2)
SODIUM SERPL-SCNC: 138 MMOL/L (ref 136–145)

## 2022-07-26 PROCEDURE — 80053 COMPREHEN METABOLIC PANEL: CPT

## 2022-07-26 PROCEDURE — 36415 COLL VENOUS BLD VENIPUNCTURE: CPT

## 2022-07-29 ENCOUNTER — TELEPHONE (OUTPATIENT)
Dept: INTERNAL MEDICINE CLINIC | Facility: CLINIC | Age: 67
End: 2022-07-29

## 2022-07-29 NOTE — TELEPHONE ENCOUNTER
Sodium is normal.  No need to increase salt intake. She should discuss with GI regarding colonoscopy prep and precautions to take.

## 2022-07-29 NOTE — TELEPHONE ENCOUNTER
Patient states she saw SD on 7/18/22 for ER f/u to hyponatremia issue on 7/12/22 during prep for colonoscoppy. Repeat Na+ level on 7/26/22, Na+=138. Patient wants to know if 138 is low and if she should intake more salt. Patient asking if S thinks she should reschedule her colonoscopy and what precautions she should take.

## 2022-07-29 NOTE — TELEPHONE ENCOUNTER
Patient notified sodium normal, o need to increase sodium intake, should speak with GI about prep and precautions. Pt verbalizes understanding.

## 2022-09-06 ENCOUNTER — OFFICE VISIT (OUTPATIENT)
Dept: FAMILY MEDICINE CLINIC | Facility: CLINIC | Age: 67
End: 2022-09-06
Payer: MEDICARE

## 2022-09-06 DIAGNOSIS — J01.00 ACUTE NON-RECURRENT MAXILLARY SINUSITIS: Primary | ICD-10-CM

## 2022-09-06 DIAGNOSIS — H65.02 NON-RECURRENT ACUTE SEROUS OTITIS MEDIA OF LEFT EAR: ICD-10-CM

## 2022-09-06 PROCEDURE — 99213 OFFICE O/P EST LOW 20 MIN: CPT | Performed by: NURSE PRACTITIONER

## 2022-09-06 RX ORDER — AZITHROMYCIN 250 MG/1
TABLET, FILM COATED ORAL
Qty: 6 TABLET | Refills: 0 | Status: SHIPPED | OUTPATIENT
Start: 2022-09-06 | End: 2022-09-11

## 2022-09-07 LAB — SARS-COV-2 RNA RESP QL NAA+PROBE: NOT DETECTED

## 2022-09-09 ENCOUNTER — OFFICE VISIT (OUTPATIENT)
Facility: LOCATION | Age: 67
End: 2022-09-09
Payer: MEDICARE

## 2022-09-09 DIAGNOSIS — J32.0 SINUSITIS, MAXILLARY, CHRONIC: Primary | ICD-10-CM

## 2022-09-09 DIAGNOSIS — H92.03 OTALGIA OF BOTH EARS: Primary | ICD-10-CM

## 2022-09-09 PROCEDURE — 99213 OFFICE O/P EST LOW 20 MIN: CPT | Performed by: OTOLARYNGOLOGY

## 2022-09-09 RX ORDER — CEFUROXIME AXETIL 250 MG/1
250 TABLET ORAL 2 TIMES DAILY
Qty: 20 TABLET | Refills: 0 | Status: SHIPPED | OUTPATIENT
Start: 2022-09-09

## 2022-09-09 RX ORDER — METHYLPREDNISOLONE 4 MG/1
TABLET ORAL
Qty: 21 TABLET | Refills: 0 | Status: SHIPPED | OUTPATIENT
Start: 2022-09-09

## 2022-09-09 RX ORDER — FLUTICASONE PROPIONATE 50 MCG
1 SPRAY, SUSPENSION (ML) NASAL 2 TIMES DAILY
Qty: 16 G | Refills: 3 | Status: SHIPPED | OUTPATIENT
Start: 2022-09-09

## 2022-09-12 NOTE — TELEPHONE ENCOUNTER
The following labs completed:    Component      Latest Ref Rng & Units 7/26/2022 7/12/2022   WBC      4.0 - 11.0 x10(3) uL  9.6   RBC      3.80 - 5.30 x10(6)uL  4.22   Hemoglobin      12.0 - 16.0 g/dL  12.6   Hematocrit      35.0 - 48.0 %  36.2   Platelet Count      382.1 - 450.0 10(3)uL  130.0 (L)   MCV      80.0 - 100.0 fL  85.8   MCH      26.0 - 34.0 pg  29.9   MCHC      31.0 - 37.0 g/dL  34.8   RDW      %  12.5   Prelim Neutrophil Abs      1.50 - 7.70 x10 (3) uL  8.19 (H)   Neutrophils Absolute      1.50 - 7.70 x10(3) uL  8.19 (H)   Lymphocytes Absolute      1.00 - 4.00 x10(3) uL  0.98 (L)   Monocytes Absolute      0.10 - 1.00 x10(3) uL  0.41   Eosinophils Absolute      0.00 - 0.70 x10(3) uL  0.00   Basophils Absolute      0.00 - 0.20 x10(3) uL  0.01   Immature Granulocyte Absolute      0.00 - 1.00 x10(3) uL  0.04   Neutrophils %      %  85.0   Lymphocytes %      %  10.2   Monocytes %      %  4.3   Eosinophils %      %  0.0   Basophils %      %  0.1   Immature Granulocyte %      %  0.4   Glucose      70 - 99 mg/dL 93    Sodium      136 - 145 mmol/L 138    Potassium      3.5 - 5.1 mmol/L 4.3    Chloride      98 - 112 mmol/L 107    Carbon Dioxide, Total      21.0 - 32.0 mmol/L 29.0    ANION GAP      0 - 18 mmol/L 2    BUN      7 - 18 mg/dL 17    CREATININE      0.55 - 1.02 mg/dL 0.67    CALCIUM      8.5 - 10.1 mg/dL 9.4    CALCULATED OSMOLALITY      275 - 295 mOsm/kg 287    eGFR NON-AFR. AMERICAN      >=60 91    eGFR AFRICAN AMERICAN      >=60 105    AST (SGOT)      15 - 37 U/L 19    ALT (SGPT)      13 - 56 U/L 27    ALKALINE PHOSPHATASE      55 - 142 U/L 82    Total Bilirubin      0.1 - 2.0 mg/dL 0.5    PROTEIN, TOTAL      6.4 - 8.2 g/dL 7.3    Albumin      3.4 - 5.0 g/dL 3.8    Globulin      2.8 - 4.4 g/dL 3.5    A/G Ratio      1.0 - 2.0 1.1    Patient Fasting for CMP? Yes      Lipid & TSH ordered ; please advise if you would like to order any additional labs?

## 2022-10-03 ENCOUNTER — OFFICE VISIT (OUTPATIENT)
Facility: LOCATION | Age: 67
End: 2022-10-03
Payer: MEDICARE

## 2022-10-03 DIAGNOSIS — J32.0 SINUSITIS, MAXILLARY, CHRONIC: Primary | ICD-10-CM

## 2022-10-03 PROCEDURE — 99214 OFFICE O/P EST MOD 30 MIN: CPT | Performed by: OTOLARYNGOLOGY

## 2022-10-03 RX ORDER — DOXYCYCLINE 100 MG/1
100 CAPSULE ORAL 2 TIMES DAILY
Qty: 30 CAPSULE | Refills: 0 | Status: SHIPPED | OUTPATIENT
Start: 2022-10-03

## 2022-10-27 ENCOUNTER — OFFICE VISIT (OUTPATIENT)
Dept: INTERNAL MEDICINE CLINIC | Facility: CLINIC | Age: 67
End: 2022-10-27
Payer: MEDICARE

## 2022-10-27 VITALS
DIASTOLIC BLOOD PRESSURE: 76 MMHG | TEMPERATURE: 97 F | HEIGHT: 64.57 IN | BODY MASS INDEX: 20.07 KG/M2 | OXYGEN SATURATION: 99 % | RESPIRATION RATE: 16 BRPM | WEIGHT: 119 LBS | SYSTOLIC BLOOD PRESSURE: 118 MMHG | HEART RATE: 78 BPM

## 2022-10-27 DIAGNOSIS — E78.2 MIXED HYPERLIPIDEMIA: ICD-10-CM

## 2022-10-27 DIAGNOSIS — R73.03 PRE-DIABETES: ICD-10-CM

## 2022-10-27 DIAGNOSIS — M19.041 PRIMARY OSTEOARTHRITIS OF BOTH HANDS: ICD-10-CM

## 2022-10-27 DIAGNOSIS — D69.6 THROMBOCYTOPENIA (HCC): ICD-10-CM

## 2022-10-27 DIAGNOSIS — Z12.31 ENCOUNTER FOR SCREENING MAMMOGRAM FOR MALIGNANT NEOPLASM OF BREAST: ICD-10-CM

## 2022-10-27 DIAGNOSIS — N63.0 BREAST NODULE: ICD-10-CM

## 2022-10-27 DIAGNOSIS — D25.9 UTERINE LEIOMYOMA, UNSPECIFIED LOCATION: ICD-10-CM

## 2022-10-27 DIAGNOSIS — M48.061 LUMBAR FORAMINAL STENOSIS: ICD-10-CM

## 2022-10-27 DIAGNOSIS — Z00.00 ENCOUNTER FOR ANNUAL HEALTH EXAMINATION: Primary | ICD-10-CM

## 2022-10-27 DIAGNOSIS — E03.8 SUBCLINICAL HYPOTHYROIDISM: ICD-10-CM

## 2022-10-27 DIAGNOSIS — J32.0 CHRONIC MAXILLARY SINUSITIS: ICD-10-CM

## 2022-10-27 DIAGNOSIS — M85.89 OSTEOPENIA OF MULTIPLE SITES: ICD-10-CM

## 2022-10-27 DIAGNOSIS — M19.042 PRIMARY OSTEOARTHRITIS OF BOTH HANDS: ICD-10-CM

## 2022-10-27 PROCEDURE — 1125F AMNT PAIN NOTED PAIN PRSNT: CPT | Performed by: INTERNAL MEDICINE

## 2022-10-27 PROCEDURE — G0439 PPPS, SUBSEQ VISIT: HCPCS | Performed by: INTERNAL MEDICINE

## 2022-10-27 PROCEDURE — 90662 IIV NO PRSV INCREASED AG IM: CPT | Performed by: INTERNAL MEDICINE

## 2022-10-27 PROCEDURE — G0008 ADMIN INFLUENZA VIRUS VAC: HCPCS | Performed by: INTERNAL MEDICINE

## 2022-10-27 PROCEDURE — 99214 OFFICE O/P EST MOD 30 MIN: CPT | Performed by: INTERNAL MEDICINE

## 2022-11-04 ENCOUNTER — OFFICE VISIT (OUTPATIENT)
Facility: LOCATION | Age: 67
End: 2022-11-04
Payer: MEDICARE

## 2022-11-04 DIAGNOSIS — R13.13 PHARYNGEAL DYSPHAGIA: ICD-10-CM

## 2022-11-04 DIAGNOSIS — J32.0 SINUSITIS, MAXILLARY, CHRONIC: Primary | ICD-10-CM

## 2022-11-04 PROCEDURE — 99214 OFFICE O/P EST MOD 30 MIN: CPT | Performed by: OTOLARYNGOLOGY

## 2022-11-04 PROCEDURE — 31575 DIAGNOSTIC LARYNGOSCOPY: CPT | Performed by: OTOLARYNGOLOGY

## 2022-11-04 PROCEDURE — 77011 CT SCAN FOR LOCALIZATION: CPT | Performed by: OTOLARYNGOLOGY

## 2022-11-04 RX ORDER — AZELASTINE 1 MG/ML
2 SPRAY, METERED NASAL 2 TIMES DAILY
Qty: 30 ML | Refills: 3 | Status: SHIPPED | OUTPATIENT
Start: 2022-11-04

## 2022-11-07 ENCOUNTER — TELEPHONE (OUTPATIENT)
Facility: LOCATION | Age: 67
End: 2022-11-07

## 2022-11-18 ENCOUNTER — TELEPHONE (OUTPATIENT)
Facility: LOCATION | Age: 67
End: 2022-11-18

## 2022-11-23 ENCOUNTER — LAB ENCOUNTER (OUTPATIENT)
Dept: LAB | Age: 67
End: 2022-11-23
Attending: INTERNAL MEDICINE
Payer: MEDICARE

## 2022-11-23 DIAGNOSIS — Z00.00 ENCOUNTER FOR ANNUAL HEALTH EXAMINATION: ICD-10-CM

## 2022-11-23 DIAGNOSIS — D69.6 THROMBOCYTOPENIA (HCC): ICD-10-CM

## 2022-11-23 DIAGNOSIS — E78.2 MIXED HYPERLIPIDEMIA: ICD-10-CM

## 2022-11-23 DIAGNOSIS — R73.03 PRE-DIABETES: ICD-10-CM

## 2022-11-23 DIAGNOSIS — E03.8 SUBCLINICAL HYPOTHYROIDISM: ICD-10-CM

## 2022-11-23 LAB
ALBUMIN SERPL-MCNC: 3.9 G/DL (ref 3.4–5)
ALBUMIN/GLOB SERPL: 1.1 {RATIO} (ref 1–2)
ALP LIVER SERPL-CCNC: 77 U/L
ALT SERPL-CCNC: 27 U/L
ANION GAP SERPL CALC-SCNC: 3 MMOL/L (ref 0–18)
AST SERPL-CCNC: 22 U/L (ref 15–37)
BASOPHILS # BLD AUTO: 0.03 X10(3) UL (ref 0–0.2)
BASOPHILS NFR BLD AUTO: 0.6 %
BILIRUB SERPL-MCNC: 0.6 MG/DL (ref 0.1–2)
BUN BLD-MCNC: 17 MG/DL (ref 7–18)
BUN/CREAT SERPL: 27 (ref 10–20)
CALCIUM BLD-MCNC: 9.4 MG/DL (ref 8.5–10.1)
CHLORIDE SERPL-SCNC: 103 MMOL/L (ref 98–112)
CHOLEST SERPL-MCNC: 196 MG/DL (ref ?–200)
CO2 SERPL-SCNC: 29 MMOL/L (ref 21–32)
CREAT BLD-MCNC: 0.63 MG/DL
DEPRECATED RDW RBC AUTO: 43 FL (ref 35.1–46.3)
EOSINOPHIL # BLD AUTO: 0.04 X10(3) UL (ref 0–0.7)
EOSINOPHIL NFR BLD AUTO: 0.8 %
ERYTHROCYTE [DISTWIDTH] IN BLOOD BY AUTOMATED COUNT: 13.1 % (ref 11–15)
EST. AVERAGE GLUCOSE BLD GHB EST-MCNC: 105 MG/DL (ref 68–126)
FASTING PATIENT LIPID ANSWER: YES
FASTING STATUS PATIENT QL REPORTED: YES
GFR SERPLBLD BASED ON 1.73 SQ M-ARVRAT: 97 ML/MIN/1.73M2 (ref 60–?)
GLOBULIN PLAS-MCNC: 3.7 G/DL (ref 2.8–4.4)
GLUCOSE BLD-MCNC: 95 MG/DL (ref 70–99)
HBA1C MFR BLD: 5.3 % (ref ?–5.7)
HCT VFR BLD AUTO: 44.8 %
HDLC SERPL-MCNC: 92 MG/DL (ref 40–59)
HGB BLD-MCNC: 14.3 G/DL
IMM GRANULOCYTES # BLD AUTO: 0 X10(3) UL (ref 0–1)
IMM GRANULOCYTES NFR BLD: 0 %
LDLC SERPL CALC-MCNC: 93 MG/DL (ref ?–100)
LYMPHOCYTES # BLD AUTO: 1.35 X10(3) UL (ref 1–4)
LYMPHOCYTES NFR BLD AUTO: 27.8 %
MCH RBC QN AUTO: 28.9 PG (ref 26–34)
MCHC RBC AUTO-ENTMCNC: 31.9 G/DL (ref 31–37)
MCV RBC AUTO: 90.5 FL
MONOCYTES # BLD AUTO: 0.31 X10(3) UL (ref 0.1–1)
MONOCYTES NFR BLD AUTO: 6.4 %
NEUTROPHILS # BLD AUTO: 3.12 X10 (3) UL (ref 1.5–7.7)
NEUTROPHILS # BLD AUTO: 3.12 X10(3) UL (ref 1.5–7.7)
NEUTROPHILS NFR BLD AUTO: 64.4 %
NONHDLC SERPL-MCNC: 104 MG/DL (ref ?–130)
OSMOLALITY SERPL CALC.SUM OF ELEC: 281 MOSM/KG (ref 275–295)
PLATELET # BLD AUTO: 174 10(3)UL (ref 150–450)
POTASSIUM SERPL-SCNC: 4.4 MMOL/L (ref 3.5–5.1)
PROT SERPL-MCNC: 7.6 G/DL (ref 6.4–8.2)
RBC # BLD AUTO: 4.95 X10(6)UL
SODIUM SERPL-SCNC: 135 MMOL/L (ref 136–145)
T4 FREE SERPL-MCNC: 0.8 NG/DL (ref 0.8–1.7)
TRIGL SERPL-MCNC: 58 MG/DL (ref 30–149)
TSI SER-ACNC: 5.01 MIU/ML (ref 0.36–3.74)
VLDLC SERPL CALC-MCNC: 9 MG/DL (ref 0–30)
WBC # BLD AUTO: 4.9 X10(3) UL (ref 4–11)

## 2022-11-23 PROCEDURE — 83036 HEMOGLOBIN GLYCOSYLATED A1C: CPT

## 2022-11-23 PROCEDURE — 84439 ASSAY OF FREE THYROXINE: CPT

## 2022-11-23 PROCEDURE — 84443 ASSAY THYROID STIM HORMONE: CPT

## 2022-11-23 PROCEDURE — 85025 COMPLETE CBC W/AUTO DIFF WBC: CPT

## 2022-11-23 PROCEDURE — 36415 COLL VENOUS BLD VENIPUNCTURE: CPT

## 2022-11-23 PROCEDURE — 80061 LIPID PANEL: CPT

## 2022-11-23 PROCEDURE — 80053 COMPREHEN METABOLIC PANEL: CPT

## 2022-12-06 PROBLEM — K63.5 POLYP OF COLON: Status: ACTIVE | Noted: 2022-12-06

## 2022-12-06 PROBLEM — Z12.11 SPECIAL SCREENING FOR MALIGNANT NEOPLASM OF COLON: Status: ACTIVE | Noted: 2022-12-06

## 2022-12-12 ENCOUNTER — HOSPITAL ENCOUNTER (OUTPATIENT)
Dept: MAMMOGRAPHY | Age: 67
Discharge: HOME | End: 2022-12-12
Attending: INTERNAL MEDICINE
Payer: MEDICARE

## 2022-12-12 DIAGNOSIS — Z12.31 ENCOUNTER FOR SCREENING MAMMOGRAM FOR MALIGNANT NEOPLASM OF BREAST: ICD-10-CM

## 2022-12-12 PROCEDURE — 77063 BREAST TOMOSYNTHESIS BI: CPT | Performed by: INTERNAL MEDICINE

## 2022-12-12 PROCEDURE — 77067 SCR MAMMO BI INCL CAD: CPT | Performed by: INTERNAL MEDICINE

## 2023-01-31 ENCOUNTER — TELEPHONE (OUTPATIENT)
Dept: INTERNAL MEDICINE CLINIC | Facility: CLINIC | Age: 68
End: 2023-01-31

## 2023-01-31 NOTE — TELEPHONE ENCOUNTER
Pt complaining of low enery, feeling very tired and dry mouth for the past month. Would like to see AD before first available of 2-20. Please advise if triage, SDA spots can be used for pt.  She would like a callback

## 2023-01-31 NOTE — TELEPHONE ENCOUNTER
Future Appointments   Date Time Provider Doyle Jeanne   2/3/2023 10:40 AM Kamaljit Ruelas MD [facility]

## 2023-02-02 ENCOUNTER — LAB ENCOUNTER (OUTPATIENT)
Dept: LAB | Age: 68
End: 2023-02-02
Attending: INTERNAL MEDICINE
Payer: MEDICARE

## 2023-02-02 ENCOUNTER — OFFICE VISIT (OUTPATIENT)
Dept: INTERNAL MEDICINE CLINIC | Facility: CLINIC | Age: 68
End: 2023-02-02
Payer: MEDICARE

## 2023-02-02 VITALS
OXYGEN SATURATION: 97 % | HEART RATE: 80 BPM | BODY MASS INDEX: 20.24 KG/M2 | WEIGHT: 120 LBS | SYSTOLIC BLOOD PRESSURE: 118 MMHG | DIASTOLIC BLOOD PRESSURE: 70 MMHG | HEIGHT: 64.57 IN

## 2023-02-02 DIAGNOSIS — M79.602 LEFT ARM PAIN: ICD-10-CM

## 2023-02-02 DIAGNOSIS — R53.83 FATIGUE, UNSPECIFIED TYPE: ICD-10-CM

## 2023-02-02 DIAGNOSIS — E87.1 CHRONIC HYPONATREMIA: ICD-10-CM

## 2023-02-02 DIAGNOSIS — R68.84 JAW PAIN: Primary | ICD-10-CM

## 2023-02-02 LAB
ALBUMIN SERPL-MCNC: 3.9 G/DL (ref 3.4–5)
ALBUMIN/GLOB SERPL: 1.1 {RATIO} (ref 1–2)
ALP LIVER SERPL-CCNC: 86 U/L
ALT SERPL-CCNC: 28 U/L
ANION GAP SERPL CALC-SCNC: 7 MMOL/L (ref 0–18)
AST SERPL-CCNC: 20 U/L (ref 15–37)
ATRIAL RATE: 73 BPM
BASOPHILS # BLD AUTO: 0.03 X10(3) UL (ref 0–0.2)
BASOPHILS NFR BLD AUTO: 0.4 %
BILIRUB SERPL-MCNC: 0.3 MG/DL (ref 0.1–2)
BUN BLD-MCNC: 23 MG/DL (ref 7–18)
CALCIUM BLD-MCNC: 9.1 MG/DL (ref 8.5–10.1)
CHLORIDE SERPL-SCNC: 99 MMOL/L (ref 98–112)
CO2 SERPL-SCNC: 28 MMOL/L (ref 21–32)
CREAT BLD-MCNC: 0.62 MG/DL
EOSINOPHIL # BLD AUTO: 0.02 X10(3) UL (ref 0–0.7)
EOSINOPHIL NFR BLD AUTO: 0.3 %
ERYTHROCYTE [DISTWIDTH] IN BLOOD BY AUTOMATED COUNT: 12.9 %
FASTING STATUS PATIENT QL REPORTED: NO
GFR SERPLBLD BASED ON 1.73 SQ M-ARVRAT: 98 ML/MIN/1.73M2 (ref 60–?)
GLOBULIN PLAS-MCNC: 3.6 G/DL (ref 2.8–4.4)
GLUCOSE BLD-MCNC: 112 MG/DL (ref 70–99)
HCT VFR BLD AUTO: 43 %
HGB BLD-MCNC: 14.2 G/DL
IMM GRANULOCYTES # BLD AUTO: 0.02 X10(3) UL (ref 0–1)
IMM GRANULOCYTES NFR BLD: 0.3 %
LYMPHOCYTES # BLD AUTO: 1.74 X10(3) UL (ref 1–4)
LYMPHOCYTES NFR BLD AUTO: 22.1 %
MCH RBC QN AUTO: 29.7 PG (ref 26–34)
MCHC RBC AUTO-ENTMCNC: 33 G/DL (ref 31–37)
MCV RBC AUTO: 90 FL
MONOCYTES # BLD AUTO: 0.37 X10(3) UL (ref 0.1–1)
MONOCYTES NFR BLD AUTO: 4.7 %
NEUTROPHILS # BLD AUTO: 5.71 X10 (3) UL (ref 1.5–7.7)
NEUTROPHILS # BLD AUTO: 5.71 X10(3) UL (ref 1.5–7.7)
NEUTROPHILS NFR BLD AUTO: 72.2 %
OSMOLALITY SERPL CALC.SUM OF ELEC: 282 MOSM/KG (ref 275–295)
P AXIS: 58 DEGREES
P-R INTERVAL: 146 MS
PLATELET # BLD AUTO: 157 10(3)UL (ref 150–450)
POTASSIUM SERPL-SCNC: 4.2 MMOL/L (ref 3.5–5.1)
PROT SERPL-MCNC: 7.5 G/DL (ref 6.4–8.2)
Q-T INTERVAL: 406 MS
QRS DURATION: 92 MS
QTC CALCULATION (BEZET): 447 MS
R AXIS: -3 DEGREES
RBC # BLD AUTO: 4.78 X10(6)UL
SODIUM SERPL-SCNC: 134 MMOL/L (ref 136–145)
T AXIS: 57 DEGREES
T4 FREE SERPL-MCNC: 0.8 NG/DL (ref 0.8–1.7)
TSI SER-ACNC: 5.12 MIU/ML (ref 0.36–3.74)
VENTRICULAR RATE: 73 BPM
WBC # BLD AUTO: 7.9 X10(3) UL (ref 4–11)

## 2023-02-02 PROCEDURE — 84439 ASSAY OF FREE THYROXINE: CPT

## 2023-02-02 PROCEDURE — 99214 OFFICE O/P EST MOD 30 MIN: CPT | Performed by: INTERNAL MEDICINE

## 2023-02-02 PROCEDURE — 80053 COMPREHEN METABOLIC PANEL: CPT

## 2023-02-02 PROCEDURE — 36415 COLL VENOUS BLD VENIPUNCTURE: CPT

## 2023-02-02 PROCEDURE — 85025 COMPLETE CBC W/AUTO DIFF WBC: CPT

## 2023-02-02 PROCEDURE — 84443 ASSAY THYROID STIM HORMONE: CPT

## 2023-02-02 PROCEDURE — 93000 ELECTROCARDIOGRAM COMPLETE: CPT | Performed by: INTERNAL MEDICINE

## 2023-02-02 RX ORDER — CHLORAL HYDRATE 500 MG
1 CAPSULE ORAL AS DIRECTED
COMMUNITY
End: 2023-02-02 | Stop reason: ALTCHOICE

## 2023-02-06 ENCOUNTER — TELEPHONE (OUTPATIENT)
Dept: INTERNAL MEDICINE CLINIC | Facility: CLINIC | Age: 68
End: 2023-02-06

## 2023-02-06 ENCOUNTER — HOSPITAL ENCOUNTER (OUTPATIENT)
Dept: CV DIAGNOSTICS | Facility: HOSPITAL | Age: 68
Discharge: HOME | End: 2023-02-06
Attending: INTERNAL MEDICINE
Payer: MEDICARE

## 2023-02-06 DIAGNOSIS — R68.84 JAW PAIN: ICD-10-CM

## 2023-02-06 DIAGNOSIS — M79.602 LEFT ARM PAIN: ICD-10-CM

## 2023-02-06 DIAGNOSIS — R53.83 FATIGUE, UNSPECIFIED TYPE: ICD-10-CM

## 2023-02-06 PROCEDURE — 93017 CV STRESS TEST TRACING ONLY: CPT | Performed by: INTERNAL MEDICINE

## 2023-02-06 NOTE — TELEPHONE ENCOUNTER
Patient returned call discussed thyroid management and how to take levothyroxine. Patient aware to retest in 6-8 weeks.

## 2023-02-06 NOTE — TELEPHONE ENCOUNTER
Pt is diabetic and she was given the following meds she wants to make sure they are safe to take.     azelastine 0.1 % Nasal Solution  levothyroxine 25 MCG Oral Tab

## 2023-02-08 ENCOUNTER — TELEPHONE (OUTPATIENT)
Dept: INTERNAL MEDICINE CLINIC | Facility: CLINIC | Age: 68
End: 2023-02-08

## 2023-02-10 ENCOUNTER — TELEPHONE (OUTPATIENT)
Dept: INTERNAL MEDICINE CLINIC | Facility: CLINIC | Age: 68
End: 2023-02-10

## 2023-02-10 DIAGNOSIS — R94.39 ABNORMAL STRESS ECG: Primary | ICD-10-CM

## 2023-02-10 NOTE — TELEPHONE ENCOUNTER
Pt has not received the results oh her treadmill stress test. She is very anxious about it and would like a callback asap.

## 2023-02-15 ENCOUNTER — APPOINTMENT (OUTPATIENT)
Dept: GENERAL RADIOLOGY | Age: 68
End: 2023-02-15
Attending: PHYSICIAN ASSISTANT
Payer: MEDICARE

## 2023-02-15 ENCOUNTER — HOSPITAL ENCOUNTER (OUTPATIENT)
Age: 68
Discharge: HOME | End: 2023-02-15
Payer: MEDICARE

## 2023-02-15 ENCOUNTER — OFFICE VISIT (OUTPATIENT)
Dept: FAMILY MEDICINE CLINIC | Facility: CLINIC | Age: 68
End: 2023-02-15
Payer: MEDICARE

## 2023-02-15 VITALS
OXYGEN SATURATION: 97 % | HEART RATE: 81 BPM | TEMPERATURE: 98 F | WEIGHT: 117 LBS | BODY MASS INDEX: 20 KG/M2 | RESPIRATION RATE: 18 BRPM | SYSTOLIC BLOOD PRESSURE: 139 MMHG | DIASTOLIC BLOOD PRESSURE: 94 MMHG

## 2023-02-15 VITALS
OXYGEN SATURATION: 99 % | BODY MASS INDEX: 21 KG/M2 | DIASTOLIC BLOOD PRESSURE: 74 MMHG | RESPIRATION RATE: 16 BRPM | SYSTOLIC BLOOD PRESSURE: 124 MMHG | TEMPERATURE: 100 F | HEART RATE: 88 BPM | WEIGHT: 122 LBS

## 2023-02-15 DIAGNOSIS — M54.31 BILATERAL SCIATICA: Primary | ICD-10-CM

## 2023-02-15 DIAGNOSIS — M54.32 BILATERAL SCIATICA: Primary | ICD-10-CM

## 2023-02-15 DIAGNOSIS — Z02.9 ENCOUNTERS FOR ADMINISTRATIVE PURPOSE: Primary | ICD-10-CM

## 2023-02-15 DIAGNOSIS — M47.26 OSTEOARTHRITIS OF SPINE WITH RADICULOPATHY, LUMBAR REGION: ICD-10-CM

## 2023-02-15 PROCEDURE — 96372 THER/PROPH/DIAG INJ SC/IM: CPT

## 2023-02-15 PROCEDURE — 99214 OFFICE O/P EST MOD 30 MIN: CPT

## 2023-02-15 PROCEDURE — 72110 X-RAY EXAM L-2 SPINE 4/>VWS: CPT | Performed by: PHYSICIAN ASSISTANT

## 2023-02-15 RX ORDER — METHYLPREDNISOLONE 4 MG/1
TABLET ORAL
Qty: 1 EACH | Refills: 0 | Status: SHIPPED | OUTPATIENT
Start: 2023-02-15

## 2023-02-15 RX ORDER — CYCLOBENZAPRINE HCL 5 MG
5 TABLET ORAL NIGHTLY
Qty: 20 TABLET | Refills: 0 | Status: SHIPPED | OUTPATIENT
Start: 2023-02-15 | End: 2023-03-07

## 2023-02-15 RX ORDER — KETOROLAC TROMETHAMINE 30 MG/ML
30 INJECTION, SOLUTION INTRAMUSCULAR; INTRAVENOUS ONCE
Status: COMPLETED | OUTPATIENT
Start: 2023-02-15 | End: 2023-02-15

## 2023-02-15 RX ORDER — DEXAMETHASONE 4 MG/1
10 TABLET ORAL ONCE
Status: COMPLETED | OUTPATIENT
Start: 2023-02-15 | End: 2023-02-15

## 2023-02-15 NOTE — DISCHARGE INSTRUCTIONS
Please return to the ER/clinic if symptoms worsen. Follow-up with your PCP in 24-48 hours as needed. The Decadron will work in your system the next several days. We will write for some additional prednisone to start on day 2 or 3 if symptoms persist.    Recommend over-the-counter lidocaine patches. Alternate with heat and ice. Also recommend taking naproxen twice daily with food. We will give a low-dose muscle relaxant to take before bed but allow 8 hours for operating machinery and make sure there is a clear path to the bathroom. Otherwise make a follow-up appointment with chiropractor and/or physical therapy for further evaluation and treatment.

## 2023-02-15 NOTE — ED INITIAL ASSESSMENT (HPI)
Pt c/o low back pain radiating into bilat buttocks all the way to bilet feet starting Sunday, denies injury, pt did move a coffee table on Saturday thinking it may be from that

## 2023-02-20 ENCOUNTER — OFFICE VISIT (OUTPATIENT)
Dept: INTERNAL MEDICINE CLINIC | Facility: CLINIC | Age: 68
End: 2023-02-20
Payer: MEDICARE

## 2023-02-20 ENCOUNTER — HOSPITAL ENCOUNTER (OUTPATIENT)
Dept: ULTRASOUND IMAGING | Facility: HOSPITAL | Age: 68
Discharge: HOME | End: 2023-02-20
Attending: INTERNAL MEDICINE
Payer: MEDICARE

## 2023-02-20 VITALS
HEART RATE: 82 BPM | TEMPERATURE: 98 F | BODY MASS INDEX: 20.74 KG/M2 | HEIGHT: 64.57 IN | DIASTOLIC BLOOD PRESSURE: 62 MMHG | WEIGHT: 123 LBS | OXYGEN SATURATION: 98 % | SYSTOLIC BLOOD PRESSURE: 98 MMHG

## 2023-02-20 DIAGNOSIS — M54.2 NECK PAIN, CHRONIC: ICD-10-CM

## 2023-02-20 DIAGNOSIS — G89.29 NECK PAIN, CHRONIC: ICD-10-CM

## 2023-02-20 DIAGNOSIS — M47.26 OSTEOARTHRITIS OF SPINE WITH RADICULOPATHY, LUMBAR REGION: ICD-10-CM

## 2023-02-20 DIAGNOSIS — M54.31 BILATERAL SCIATICA: Primary | ICD-10-CM

## 2023-02-20 DIAGNOSIS — M54.32 BILATERAL SCIATICA: Primary | ICD-10-CM

## 2023-02-20 DIAGNOSIS — M51.36 DDD (DEGENERATIVE DISC DISEASE), LUMBAR: ICD-10-CM

## 2023-02-20 PROCEDURE — 99214 OFFICE O/P EST MOD 30 MIN: CPT | Performed by: INTERNAL MEDICINE

## 2023-02-20 PROCEDURE — 93880 EXTRACRANIAL BILAT STUDY: CPT | Performed by: INTERNAL MEDICINE

## 2023-02-23 ENCOUNTER — HOSPITAL ENCOUNTER (INPATIENT)
Facility: HOSPITAL | Age: 68
LOS: 2 days | Discharge: HOME | DRG: 375 | End: 2023-02-27
Attending: EMERGENCY MEDICINE | Admitting: HOSPITALIST
Payer: MEDICARE

## 2023-02-23 ENCOUNTER — APPOINTMENT (OUTPATIENT)
Dept: CT IMAGING | Facility: HOSPITAL | Age: 68
DRG: 375 | End: 2023-02-23
Attending: EMERGENCY MEDICINE
Payer: MEDICARE

## 2023-02-23 ENCOUNTER — APPOINTMENT (OUTPATIENT)
Dept: GENERAL RADIOLOGY | Facility: HOSPITAL | Age: 68
DRG: 375 | End: 2023-02-23
Attending: EMERGENCY MEDICINE
Payer: MEDICARE

## 2023-02-23 DIAGNOSIS — E87.6 HYPOKALEMIA: ICD-10-CM

## 2023-02-23 DIAGNOSIS — R10.9 ABDOMINAL PAIN: ICD-10-CM

## 2023-02-23 DIAGNOSIS — E87.1 HYPONATREMIA: ICD-10-CM

## 2023-02-23 DIAGNOSIS — R07.9 CHEST PAIN, RULE OUT ACUTE MYOCARDIAL INFARCTION: Primary | ICD-10-CM

## 2023-02-23 DIAGNOSIS — R79.89 AZOTEMIA: ICD-10-CM

## 2023-02-23 LAB
ALBUMIN SERPL-MCNC: 4 G/DL (ref 3.4–5)
ALBUMIN/GLOB SERPL: 1.3 {RATIO} (ref 1–2)
ALP LIVER SERPL-CCNC: 80 U/L
ALT SERPL-CCNC: 26 U/L
ANION GAP SERPL CALC-SCNC: 10 MMOL/L (ref 0–18)
APTT PPP: 26.9 SECONDS (ref 23.3–35.6)
AST SERPL-CCNC: 19 U/L (ref 15–37)
BASOPHILS # BLD AUTO: 0.01 X10(3) UL (ref 0–0.2)
BASOPHILS NFR BLD AUTO: 0.1 %
BILIRUB SERPL-MCNC: 0.7 MG/DL (ref 0.1–2)
BILIRUB UR QL STRIP.AUTO: NEGATIVE
BUN BLD-MCNC: 15 MG/DL (ref 7–18)
CALCIUM BLD-MCNC: 8.8 MG/DL (ref 8.5–10.1)
CHLORIDE SERPL-SCNC: 87 MMOL/L (ref 98–112)
CLARITY UR REFRACT.AUTO: CLEAR
CO2 SERPL-SCNC: 26 MMOL/L (ref 21–32)
CREAT BLD-MCNC: 0.49 MG/DL
EOSINOPHIL # BLD AUTO: 0.01 X10(3) UL (ref 0–0.7)
EOSINOPHIL NFR BLD AUTO: 0.1 %
ERYTHROCYTE [DISTWIDTH] IN BLOOD BY AUTOMATED COUNT: 12.4 %
GFR SERPLBLD BASED ON 1.73 SQ M-ARVRAT: 103 ML/MIN/1.73M2 (ref 60–?)
GLOBULIN PLAS-MCNC: 3.1 G/DL (ref 2.8–4.4)
GLUCOSE BLD-MCNC: 182 MG/DL (ref 70–99)
GLUCOSE UR STRIP.AUTO-MCNC: 150 MG/DL
HCT VFR BLD AUTO: 37.6 %
HGB BLD-MCNC: 13.2 G/DL
IMM GRANULOCYTES # BLD AUTO: 0.06 X10(3) UL (ref 0–1)
IMM GRANULOCYTES NFR BLD: 0.7 %
INR BLD: 1 (ref 0.85–1.16)
KETONES UR STRIP.AUTO-MCNC: 20 MG/DL
LEUKOCYTE ESTERASE UR QL STRIP.AUTO: NEGATIVE
LIPASE SERPL-CCNC: 231 U/L (ref 13–75)
LIPASE SERPL-CCNC: 899 U/L (ref 73–393)
LYMPHOCYTES # BLD AUTO: 1.53 X10(3) UL (ref 1–4)
LYMPHOCYTES NFR BLD AUTO: 17.3 %
MCH RBC QN AUTO: 29.6 PG (ref 26–34)
MCHC RBC AUTO-ENTMCNC: 35.1 G/DL (ref 31–37)
MCV RBC AUTO: 84.3 FL
MONOCYTES # BLD AUTO: 0.59 X10(3) UL (ref 0.1–1)
MONOCYTES NFR BLD AUTO: 6.7 %
NEUTROPHILS # BLD AUTO: 6.66 X10 (3) UL (ref 1.5–7.7)
NEUTROPHILS # BLD AUTO: 6.66 X10(3) UL (ref 1.5–7.7)
NEUTROPHILS NFR BLD AUTO: 75.1 %
NITRITE UR QL STRIP.AUTO: NEGATIVE
OSMOLALITY SERPL CALC.SUM OF ELEC: 261 MOSM/KG (ref 275–295)
PH UR STRIP.AUTO: 8 [PH] (ref 5–8)
PLATELET # BLD AUTO: 176 10(3)UL (ref 150–450)
POTASSIUM SERPL-SCNC: 3 MMOL/L (ref 3.5–5.1)
PROT SERPL-MCNC: 7.1 G/DL (ref 6.4–8.2)
PROT UR STRIP.AUTO-MCNC: NEGATIVE MG/DL
PROTHROMBIN TIME: 13.2 SECONDS (ref 11.6–14.8)
RBC # BLD AUTO: 4.46 X10(6)UL
RBC UR QL AUTO: NEGATIVE
SARS-COV-2 RNA RESP QL NAA+PROBE: NOT DETECTED
SODIUM SERPL-SCNC: 123 MMOL/L (ref 136–145)
SP GR UR STRIP.AUTO: 1.01 (ref 1–1.03)
TROPONIN I HIGH SENSITIVITY: 5 NG/L
UROBILINOGEN UR STRIP.AUTO-MCNC: <2 MG/DL
WBC # BLD AUTO: 8.9 X10(3) UL (ref 4–11)

## 2023-02-23 PROCEDURE — 74177 CT ABD & PELVIS W/CONTRAST: CPT | Performed by: EMERGENCY MEDICINE

## 2023-02-23 PROCEDURE — 70450 CT HEAD/BRAIN W/O DYE: CPT | Performed by: EMERGENCY MEDICINE

## 2023-02-23 PROCEDURE — 71045 X-RAY EXAM CHEST 1 VIEW: CPT | Performed by: EMERGENCY MEDICINE

## 2023-02-23 RX ORDER — MORPHINE SULFATE 2 MG/ML
2 INJECTION, SOLUTION INTRAMUSCULAR; INTRAVENOUS ONCE
Status: COMPLETED | OUTPATIENT
Start: 2023-02-23 | End: 2023-02-23

## 2023-02-23 RX ORDER — ONDANSETRON 2 MG/ML
4 INJECTION INTRAMUSCULAR; INTRAVENOUS ONCE
Status: COMPLETED | OUTPATIENT
Start: 2023-02-23 | End: 2023-02-23

## 2023-02-24 PROBLEM — E87.6 HYPOKALEMIA: Status: ACTIVE | Noted: 2023-02-24

## 2023-02-24 PROBLEM — R79.89 AZOTEMIA: Status: ACTIVE | Noted: 2023-02-24

## 2023-02-24 LAB
ANION GAP SERPL CALC-SCNC: 8 MMOL/L (ref 0–18)
ATRIAL RATE: 64 BPM
BASOPHILS # BLD AUTO: 0.01 X10(3) UL (ref 0–0.2)
BASOPHILS NFR BLD AUTO: 0.1 %
BUN BLD-MCNC: 8 MG/DL (ref 7–18)
CALCIUM BLD-MCNC: 8.5 MG/DL (ref 8.5–10.1)
CHLORIDE SERPL-SCNC: 93 MMOL/L (ref 98–112)
CO2 SERPL-SCNC: 22 MMOL/L (ref 21–32)
CREAT BLD-MCNC: 0.4 MG/DL
EOSINOPHIL # BLD AUTO: 0 X10(3) UL (ref 0–0.7)
EOSINOPHIL NFR BLD AUTO: 0 %
ERYTHROCYTE [DISTWIDTH] IN BLOOD BY AUTOMATED COUNT: 12.3 %
EST. AVERAGE GLUCOSE BLD GHB EST-MCNC: 120 MG/DL (ref 68–126)
GFR SERPLBLD BASED ON 1.73 SQ M-ARVRAT: 108 ML/MIN/1.73M2 (ref 60–?)
GLUCOSE BLD-MCNC: 101 MG/DL (ref 70–99)
GLUCOSE BLD-MCNC: 110 MG/DL (ref 70–99)
GLUCOSE BLD-MCNC: 119 MG/DL (ref 70–99)
GLUCOSE BLD-MCNC: 120 MG/DL (ref 70–99)
GLUCOSE BLD-MCNC: 144 MG/DL (ref 70–99)
GLUCOSE BLD-MCNC: 180 MG/DL (ref 70–99)
HBA1C MFR BLD: 5.8 % (ref ?–5.7)
HCT VFR BLD AUTO: 36.4 %
HGB BLD-MCNC: 12.9 G/DL
IMM GRANULOCYTES # BLD AUTO: 0.02 X10(3) UL (ref 0–1)
IMM GRANULOCYTES NFR BLD: 0.2 %
LYMPHOCYTES # BLD AUTO: 0.84 X10(3) UL (ref 1–4)
LYMPHOCYTES NFR BLD AUTO: 9.4 %
MCH RBC QN AUTO: 29.7 PG (ref 26–34)
MCHC RBC AUTO-ENTMCNC: 35.4 G/DL (ref 31–37)
MCV RBC AUTO: 83.7 FL
MONOCYTES # BLD AUTO: 0.34 X10(3) UL (ref 0.1–1)
MONOCYTES NFR BLD AUTO: 3.8 %
NEUTROPHILS # BLD AUTO: 7.73 X10 (3) UL (ref 1.5–7.7)
NEUTROPHILS # BLD AUTO: 7.73 X10(3) UL (ref 1.5–7.7)
NEUTROPHILS NFR BLD AUTO: 86.5 %
OSMOLALITY SERPL CALC.SUM OF ELEC: 257 MOSM/KG (ref 275–295)
P AXIS: 26 DEGREES
P-R INTERVAL: 162 MS
PLATELET # BLD AUTO: 178 10(3)UL (ref 150–450)
POTASSIUM SERPL-SCNC: 3.4 MMOL/L (ref 3.5–5.1)
POTASSIUM SERPL-SCNC: 3.4 MMOL/L (ref 3.5–5.1)
Q-T INTERVAL: 458 MS
QRS DURATION: 104 MS
QTC CALCULATION (BEZET): 472 MS
R AXIS: -6 DEGREES
RBC # BLD AUTO: 4.35 X10(6)UL
SODIUM SERPL-SCNC: 123 MMOL/L (ref 136–145)
T AXIS: 2 DEGREES
VENTRICULAR RATE: 64 BPM
WBC # BLD AUTO: 8.9 X10(3) UL (ref 4–11)

## 2023-02-24 PROCEDURE — 99223 1ST HOSP IP/OBS HIGH 75: CPT | Performed by: HOSPITALIST

## 2023-02-24 RX ORDER — ONDANSETRON 2 MG/ML
4 INJECTION INTRAMUSCULAR; INTRAVENOUS EVERY 8 HOURS
Status: DISCONTINUED | OUTPATIENT
Start: 2023-02-24 | End: 2023-02-26

## 2023-02-24 RX ORDER — MORPHINE SULFATE 2 MG/ML
2 INJECTION, SOLUTION INTRAMUSCULAR; INTRAVENOUS EVERY 2 HOUR PRN
Status: DISCONTINUED | OUTPATIENT
Start: 2023-02-24 | End: 2023-02-27

## 2023-02-24 RX ORDER — HYDROCODONE BITARTRATE AND ACETAMINOPHEN 5; 325 MG/1; MG/1
1 TABLET ORAL EVERY 4 HOURS PRN
Status: DISCONTINUED | OUTPATIENT
Start: 2023-02-24 | End: 2023-02-27

## 2023-02-24 RX ORDER — AZELASTINE 1 MG/ML
2 SPRAY, METERED NASAL 2 TIMES DAILY
Status: DISCONTINUED | OUTPATIENT
Start: 2023-02-24 | End: 2023-02-27

## 2023-02-24 RX ORDER — METHOCARBAMOL 100 MG/ML
500 INJECTION, SOLUTION INTRAMUSCULAR; INTRAVENOUS EVERY 8 HOURS PRN
Status: DISCONTINUED | OUTPATIENT
Start: 2023-02-24 | End: 2023-02-26

## 2023-02-24 RX ORDER — ONDANSETRON 2 MG/ML
4 INJECTION INTRAMUSCULAR; INTRAVENOUS EVERY 6 HOURS PRN
Status: DISCONTINUED | OUTPATIENT
Start: 2023-02-24 | End: 2023-02-24

## 2023-02-24 RX ORDER — HYDROCODONE BITARTRATE AND ACETAMINOPHEN 5; 325 MG/1; MG/1
2 TABLET ORAL EVERY 4 HOURS PRN
Status: DISCONTINUED | OUTPATIENT
Start: 2023-02-24 | End: 2023-02-27

## 2023-02-24 RX ORDER — NICOTINE POLACRILEX 4 MG
30 LOZENGE BUCCAL
Status: DISCONTINUED | OUTPATIENT
Start: 2023-02-24 | End: 2023-02-27

## 2023-02-24 RX ORDER — PROCHLORPERAZINE EDISYLATE 5 MG/ML
10 INJECTION INTRAMUSCULAR; INTRAVENOUS EVERY 6 HOURS PRN
Status: DISCONTINUED | OUTPATIENT
Start: 2023-02-24 | End: 2023-02-27

## 2023-02-24 RX ORDER — MELATONIN
3 NIGHTLY PRN
Status: DISCONTINUED | OUTPATIENT
Start: 2023-02-24 | End: 2023-02-27

## 2023-02-24 RX ORDER — DEXTROSE MONOHYDRATE 25 G/50ML
50 INJECTION, SOLUTION INTRAVENOUS
Status: DISCONTINUED | OUTPATIENT
Start: 2023-02-24 | End: 2023-02-27

## 2023-02-24 RX ORDER — MORPHINE SULFATE 4 MG/ML
4 INJECTION, SOLUTION INTRAMUSCULAR; INTRAVENOUS EVERY 2 HOUR PRN
Status: DISCONTINUED | OUTPATIENT
Start: 2023-02-24 | End: 2023-02-27

## 2023-02-24 RX ORDER — SODIUM CHLORIDE 9 MG/ML
INJECTION, SOLUTION INTRAVENOUS CONTINUOUS
Status: DISCONTINUED | OUTPATIENT
Start: 2023-02-24 | End: 2023-02-25

## 2023-02-24 RX ORDER — ACETAMINOPHEN 500 MG
500 TABLET ORAL EVERY 4 HOURS PRN
Status: DISCONTINUED | OUTPATIENT
Start: 2023-02-24 | End: 2023-02-27

## 2023-02-24 RX ORDER — MORPHINE SULFATE 2 MG/ML
1 INJECTION, SOLUTION INTRAMUSCULAR; INTRAVENOUS EVERY 2 HOUR PRN
Status: DISCONTINUED | OUTPATIENT
Start: 2023-02-24 | End: 2023-02-27

## 2023-02-24 RX ORDER — POTASSIUM CHLORIDE 20 MEQ/1
40 TABLET, EXTENDED RELEASE ORAL ONCE
Status: COMPLETED | OUTPATIENT
Start: 2023-02-24 | End: 2023-02-24

## 2023-02-24 RX ORDER — METHOCARBAMOL 100 MG/ML
500 INJECTION, SOLUTION INTRAMUSCULAR; INTRAVENOUS EVERY 12 HOURS
Status: COMPLETED | OUTPATIENT
Start: 2023-02-24 | End: 2023-02-24

## 2023-02-24 RX ORDER — METOCLOPRAMIDE HYDROCHLORIDE 5 MG/ML
10 INJECTION INTRAMUSCULAR; INTRAVENOUS EVERY 8 HOURS PRN
Status: DISCONTINUED | OUTPATIENT
Start: 2023-02-24 | End: 2023-02-24

## 2023-02-24 RX ORDER — NICOTINE POLACRILEX 4 MG
15 LOZENGE BUCCAL
Status: DISCONTINUED | OUTPATIENT
Start: 2023-02-24 | End: 2023-02-27

## 2023-02-24 RX ORDER — ACETAMINOPHEN 325 MG/1
650 TABLET ORAL EVERY 4 HOURS PRN
Status: DISCONTINUED | OUTPATIENT
Start: 2023-02-24 | End: 2023-02-27

## 2023-02-24 RX ORDER — LEVOTHYROXINE SODIUM 0.03 MG/1
25 TABLET ORAL
Status: DISCONTINUED | OUTPATIENT
Start: 2023-02-24 | End: 2023-02-27

## 2023-02-24 RX ORDER — ENOXAPARIN SODIUM 100 MG/ML
40 INJECTION SUBCUTANEOUS DAILY
Status: DISCONTINUED | OUTPATIENT
Start: 2023-02-24 | End: 2023-02-27

## 2023-02-24 RX ORDER — CYCLOBENZAPRINE HCL 5 MG
5 TABLET ORAL NIGHTLY
Status: DISCONTINUED | OUTPATIENT
Start: 2023-02-24 | End: 2023-02-24

## 2023-02-24 RX ORDER — METHOCARBAMOL 500 MG/1
500 TABLET, FILM COATED ORAL EVERY 12 HOURS
Status: DISCONTINUED | OUTPATIENT
Start: 2023-02-25 | End: 2023-02-26

## 2023-02-24 NOTE — ED INITIAL ASSESSMENT (HPI)
Pt to ED for N/V, dizziness and weakness starting today. Pt was found on the floor by EMS, pt stated she lowered herself to the ground because she felt weak, did not fall no LOC, no blood thinner. EMS administered ODT Zofran.  20G IV established by EMS in the left wrist.

## 2023-02-24 NOTE — PROGRESS NOTES
Progress Notes:     79year old female admitted overnight with 1 day history of n/v and left shoulder pain. She is persistently nauseated today morning and diaphoretic. Abdominal exam with active BS, soft, non-tender to palpation.      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

## 2023-02-24 NOTE — DISCHARGE INSTRUCTIONS
Referrals to follow up with a psychotherapist     Antonio Serna   Psychologist [facility])   [address] [place], [address]   [phone] Extension: [address] [facility]   Therapist ([address])   [address] [place], [address]   [phone] Extension: [facility] [place], [address]   [phone] Extension:      [address])   [address], [address] [facility] [place], [place], [address]   [phone] Extension: [facility] [place].  [address] Work/Therapist [facility])   [facility] [address], [place], [address]   [phone] Extension:

## 2023-02-24 NOTE — PROGRESS NOTES
23 1300   Over the last 2 weeks, how often have you been bothered by any of the following problems? Little interest or pleasure in doing things 1   Feeling down, depressed, or hopeless 1   Trouble falling or staying asleep, or sleeping too much 1   Feeling tired or having little energy 1   Poor appetite or overeating 1   Feeling bad about yourself - or that you are a failure or have let yourself or your family down 0   Trouble concentrating on things, such as reading the newspaper or watching television 0   Moving or speaking so slowly that other people could have noticed. Or the opposite - being so fidgety or restless that you have been moving around a lot more than usual 0   Thoughts that you would be better off dead, or of hurting yourself in some way 0   PHQ-9 TOTAL SCORE 5   If you checked off any problems, how difficult have these problems made it for you to do your work, take care of things at home, or get along with other people? Somewhat difficult     [address] [facility] Resource Referral Counselor Note    Keeley Lawler Patient Status:  Observation    4/10/1955 MRN [id number]   [facility] 8NE-A Attending Shanthi Galarza MD   Hosp Day # 0 PCP Sharlene Reyna MD       S(subjective) Patient states she is not been feeling well at home and having nausea, pain and vomiting. She said, she now finds out she has a mass and waiting to here if it is cancer. She admits to being afraid of that. Vonnie said, at home it has been stressful because she has been dealing with a leak in her home and she lives by herself. She denies any suicidal thoughts. O(objective) The patient is alert and oriented x4. She appears anxious and sad. A(assessment) The patient was allowed to ventilate her thoughts and feelings. P(plan) She said, she is waiting for her children to come in tomorrow.   She said, she doesn't think she needs any mental health referrals now but will to take some in the discharge summary.       Kenya Bauman RN  2/24/2023  1:01 PM

## 2023-02-24 NOTE — ED QUICK NOTES
Orders for admission, patient is aware of plan and ready to go upstairs. Any questions, please call ED NEWTON Motley  at extension [phone]. Vaccinated? Type of COVID test sent:rapid  COVID Suspicion level: negative      Titratable drug(s) infusin.9ns and Potassium infusing per order  Rate:    LOC at time of transport: A+Ox4    Other pertinent information:    CIWA score=  NIH score=

## 2023-02-24 NOTE — PLAN OF CARE
Assumed care for patient at 0730. AOx4. NSR on cardiac monitor. Adequate saturation on RA. Lung sounds clear. Denies sob. Reports mid upper abdominal cramping. Nausea. And leg cramping. Reviewed plan with Dr Bay Jacob. GI was consulted and notified. Methocarbamol and compazine given. Pt reported relief of cramping pain and nausea. Bowel sounds present. Had a few bites of breakfast but couldn't eat due to nausea. IVF infusing and electrolytes replaced per protocol. Good urine output. Reports LBM yesterday. Resting in bed. Pt reports feeling weak.

## 2023-02-24 NOTE — PROGRESS NOTES
NURSING ADMISSION NOTE      Patient admitted via Cart  Oriented to room. Safety precautions initiated. Bed in low position. Call light in reach. Admission navigator completed with patient. PTA meds reviewed. Alert and oriented x4. On room air with adequate O2 saturations. NSR on tele. No complaints of chest pain or shortness of breath. Incontinent of bladder at times. Per patient, at home she ambulates without assistive device. Pt c/o being very dizzy and nauseous upon arrival to floor. Patient clammy and diaphoretic. Blood sugar checked, 180. Nausea resolved with antiemetic. Pt c/o leg cramping. Dr. Monika Vasquez at bedside and notified. Pt given PRN pain medication (see MAR). Fluids and potassium infusing per order. Plan of care reviewed with patient, verbalized understanding. Needs met at this time.     Plan:  Monitor electrolytes  Treat nausea  0.9 normal saline IV

## 2023-02-25 LAB
ANION GAP SERPL CALC-SCNC: 6 MMOL/L (ref 0–18)
BUN BLD-MCNC: 8 MG/DL (ref 7–18)
CALCIUM BLD-MCNC: 8.7 MG/DL (ref 8.5–10.1)
CHLORIDE SERPL-SCNC: 111 MMOL/L (ref 98–112)
CO2 SERPL-SCNC: 22 MMOL/L (ref 21–32)
CREAT BLD-MCNC: 0.53 MG/DL
GFR SERPLBLD BASED ON 1.73 SQ M-ARVRAT: 101 ML/MIN/1.73M2 (ref 60–?)
GLUCOSE BLD-MCNC: 120 MG/DL (ref 70–99)
GLUCOSE BLD-MCNC: 87 MG/DL (ref 70–99)
GLUCOSE BLD-MCNC: 90 MG/DL (ref 70–99)
GLUCOSE BLD-MCNC: 95 MG/DL (ref 70–99)
GLUCOSE BLD-MCNC: 99 MG/DL (ref 70–99)
OSMOLALITY SERPL CALC.SUM OF ELEC: 286 MOSM/KG (ref 275–295)
POTASSIUM SERPL-SCNC: 3.9 MMOL/L (ref 3.5–5.1)
POTASSIUM SERPL-SCNC: 3.9 MMOL/L (ref 3.5–5.1)
SODIUM SERPL-SCNC: 139 MMOL/L (ref 136–145)

## 2023-02-25 PROCEDURE — 99232 SBSQ HOSP IP/OBS MODERATE 35: CPT | Performed by: INTERNAL MEDICINE

## 2023-02-25 NOTE — PLAN OF CARE
Alert and oriented x4 on tele monitor hr 70's sinus rhythm. Denies any pain and no nausea. Updated w/ poc and verbalized understanding. All needs attended and will continue to monitor. Call light within reach.   Problem: GASTROINTESTINAL - ADULT  Goal: Minimal or absence of nausea and vomiting  Description: INTERVENTIONS:  - Maintain adequate hydration with IV or PO as ordered and tolerated  - Nasogastric tube to low intermittent suction as ordered  - Evaluate effectiveness of ordered antiemetic medications  - Provide nonpharmacologic comfort measures as appropriate  - Advance diet as tolerated, if ordered  - Obtain nutritional consult as needed  - Evaluate fluid balance  Outcome: Progressing  Goal: Maintains or returns to baseline bowel function  Description: INTERVENTIONS:  - Assess bowel function  - Maintain adequate hydration with IV or PO as ordered and tolerated  - Evaluate effectiveness of GI medications  - Encourage mobilization and activity  - Obtain nutritional consult as needed  - Establish a toileting routine/schedule  - Consider collaborating with pharmacy to review patient's medication profile  Outcome: Progressing  Goal: Maintains adequate nutritional intake (undernourished)  Description: INTERVENTIONS:  - Monitor percentage of each meal consumed  - Identify factors contributing to decreased intake, treat as appropriate  - Assist with meals as needed  - Monitor I&O, WT and lab values  - Obtain nutritional consult as needed  - Optimize oral hygiene and moisture  - Encourage food from home; allow for food preferences  - Enhance eating environment  Outcome: Progressing  Goal: Achieves appropriate nutritional intake (bariatric)  Description: INTERVENTIONS:  - Monitor for over-consumption  - Identify factors contributing to increased intake, treat as appropriate  - Monitor I&O, WT and lab values  - Obtain nutritional consult as needed  - Evaluate psychosocial factors contributing to over-consumption  Outcome: Progressing     Problem: Diabetes/Glucose Control  Goal: Glucose maintained within prescribed range  Description: INTERVENTIONS:  - Monitor Blood Glucose as ordered  - Assess for signs and symptoms of hyperglycemia and hypoglycemia  - Administer ordered medications to maintain glucose within target range  - Assess barriers to adequate nutritional intake and initiate nutrition consult as needed  - Instruct patient on self management of diabetes  Outcome: Progressing

## 2023-02-25 NOTE — PLAN OF CARE
RN SHIFT NOTE    Assumed care of pt at 0700. Pt denies pain at this time. Patient is alert and oriented x 4 (Reminders and reorientation completed). Patient is NSR on tele, S1 and S2 present and pt denies cardiac symptoms. Lung sounds clear. Last BM on 2/24. Abdomen soft and round. Skin is intact. Tolerating medications and care needs have been met at this time.      POC: EGD Monday, IVF 0.9 NS running at 100 ml/hr    Problem: GASTROINTESTINAL - ADULT  Goal: Minimal or absence of nausea and vomiting  Description: INTERVENTIONS:  - Maintain adequate hydration with IV or PO as ordered and tolerated  - Nasogastric tube to low intermittent suction as ordered  - Evaluate effectiveness of ordered antiemetic medications  - Provide nonpharmacologic comfort measures as appropriate  - Advance diet as tolerated, if ordered  - Obtain nutritional consult as needed  - Evaluate fluid balance  Outcome: Progressing  Goal: Maintains or returns to baseline bowel function  Description: INTERVENTIONS:  - Assess bowel function  - Maintain adequate hydration with IV or PO as ordered and tolerated  - Evaluate effectiveness of GI medications  - Encourage mobilization and activity  - Obtain nutritional consult as needed  - Establish a toileting routine/schedule  - Consider collaborating with pharmacy to review patient's medication profile  Outcome: Progressing  Goal: Maintains adequate nutritional intake (undernourished)  Description: INTERVENTIONS:  - Monitor percentage of each meal consumed  - Identify factors contributing to decreased intake, treat as appropriate  - Assist with meals as needed  - Monitor I&O, WT and lab values  - Obtain nutritional consult as needed  - Optimize oral hygiene and moisture  - Encourage food from home; allow for food preferences  - Enhance eating environment  Outcome: Progressing  Goal: Achieves appropriate nutritional intake (bariatric)  Description: INTERVENTIONS:  - Monitor for over-consumption  - Identify factors contributing to increased intake, treat as appropriate  - Monitor I&O, WT and lab values  - Obtain nutritional consult as needed  - Evaluate psychosocial factors contributing to over-consumption  Outcome: Progressing     Problem: Diabetes/Glucose Control  Goal: Glucose maintained within prescribed range  Description: INTERVENTIONS:  - Monitor Blood Glucose as ordered  - Assess for signs and symptoms of hyperglycemia and hypoglycemia  - Administer ordered medications to maintain glucose within target range  - Assess barriers to adequate nutritional intake and initiate nutrition consult as needed  - Instruct patient on self management of diabetes  Outcome: Progressing

## 2023-02-26 ENCOUNTER — APPOINTMENT (OUTPATIENT)
Dept: CT IMAGING | Facility: HOSPITAL | Age: 68
DRG: 375 | End: 2023-02-26
Attending: INTERNAL MEDICINE
Payer: MEDICARE

## 2023-02-26 LAB
GLUCOSE BLD-MCNC: 73 MG/DL (ref 70–99)
GLUCOSE BLD-MCNC: 84 MG/DL (ref 70–99)
GLUCOSE BLD-MCNC: 90 MG/DL (ref 70–99)
GLUCOSE BLD-MCNC: 97 MG/DL (ref 70–99)

## 2023-02-26 PROCEDURE — 99232 SBSQ HOSP IP/OBS MODERATE 35: CPT | Performed by: INTERNAL MEDICINE

## 2023-02-26 PROCEDURE — 71260 CT THORAX DX C+: CPT | Performed by: INTERNAL MEDICINE

## 2023-02-26 RX ORDER — POLYETHYLENE GLYCOL 3350 17 G/17G
17 POWDER, FOR SOLUTION ORAL DAILY PRN
Status: DISCONTINUED | OUTPATIENT
Start: 2023-02-26 | End: 2023-02-27

## 2023-02-26 RX ORDER — BISACODYL 10 MG
10 SUPPOSITORY, RECTAL RECTAL
Status: DISCONTINUED | OUTPATIENT
Start: 2023-02-26 | End: 2023-02-27

## 2023-02-26 RX ORDER — METHOCARBAMOL 500 MG/1
500 TABLET, FILM COATED ORAL NIGHTLY PRN
Status: DISCONTINUED | OUTPATIENT
Start: 2023-02-26 | End: 2023-02-27

## 2023-02-26 RX ORDER — DOCUSATE SODIUM 100 MG/1
100 CAPSULE, LIQUID FILLED ORAL 2 TIMES DAILY
Status: DISCONTINUED | OUTPATIENT
Start: 2023-02-26 | End: 2023-02-27

## 2023-02-26 RX ORDER — SODIUM PHOSPHATE, DIBASIC AND SODIUM PHOSPHATE, MONOBASIC 7; 19 G/133ML; G/133ML
1 ENEMA RECTAL ONCE AS NEEDED
Status: DISCONTINUED | OUTPATIENT
Start: 2023-02-26 | End: 2023-02-27

## 2023-02-26 RX ORDER — ONDANSETRON 2 MG/ML
4 INJECTION INTRAMUSCULAR; INTRAVENOUS EVERY 6 HOURS PRN
Status: DISCONTINUED | OUTPATIENT
Start: 2023-02-26 | End: 2023-02-27

## 2023-02-26 NOTE — PLAN OF CARE
Assumed care of pt at 0700. Pt denies pain at this time. Patient is A&Ox 4 (Reminders and reorientation completed). Patient is NSR on tele, S1 and S2 present and pt denies cardiac symptoms. Lung sounds clear. No edema. Abdomen soft and round. Last BM on 2/24. Skin intact. Tolerating medication and care needs have been met at this time.     POC: EDG on Monday    Problem: GASTROINTESTINAL - ADULT  Goal: Minimal or absence of nausea and vomiting  Description: INTERVENTIONS:  - Maintain adequate hydration with IV or PO as ordered and tolerated  - Nasogastric tube to low intermittent suction as ordered  - Evaluate effectiveness of ordered antiemetic medications  - Provide nonpharmacologic comfort measures as appropriate  - Advance diet as tolerated, if ordered  - Obtain nutritional consult as needed  - Evaluate fluid balance  Outcome: Progressing  Goal: Maintains or returns to baseline bowel function  Description: INTERVENTIONS:  - Assess bowel function  - Maintain adequate hydration with IV or PO as ordered and tolerated  - Evaluate effectiveness of GI medications  - Encourage mobilization and activity  - Obtain nutritional consult as needed  - Establish a toileting routine/schedule  - Consider collaborating with pharmacy to review patient's medication profile  Outcome: Progressing  Goal: Maintains adequate nutritional intake (undernourished)  Description: INTERVENTIONS:  - Monitor percentage of each meal consumed  - Identify factors contributing to decreased intake, treat as appropriate  - Assist with meals as needed  - Monitor I&O, WT and lab values  - Obtain nutritional consult as needed  - Optimize oral hygiene and moisture  - Encourage food from home; allow for food preferences  - Enhance eating environment  Outcome: Progressing  Goal: Achieves appropriate nutritional intake (bariatric)  Description: INTERVENTIONS:  - Monitor for over-consumption  - Identify factors contributing to increased intake, treat as appropriate  - Monitor I&O, WT and lab values  - Obtain nutritional consult as needed  - Evaluate psychosocial factors contributing to over-consumption  Outcome: Progressing     Problem: Diabetes/Glucose Control  Goal: Glucose maintained within prescribed range  Description: INTERVENTIONS:  - Monitor Blood Glucose as ordered  - Assess for signs and symptoms of hyperglycemia and hypoglycemia  - Administer ordered medications to maintain glucose within target range  - Assess barriers to adequate nutritional intake and initiate nutrition consult as needed  - Instruct patient on self management of diabetes  Outcome: Progressing

## 2023-02-26 NOTE — PLAN OF CARE
Alert and oriented x4 on tele monitor hr 60's sinus rhythm. Denies any pain and no nausea. Updated w/ poc and verbalized understanding. All needs attended and will continue to monitor. Call light within reach.   Problem: GASTROINTESTINAL - ADULT  Goal: Minimal or absence of nausea and vomiting  Description: INTERVENTIONS:  - Maintain adequate hydration with IV or PO as ordered and tolerated  - Nasogastric tube to low intermittent suction as ordered  - Evaluate effectiveness of ordered antiemetic medications  - Provide nonpharmacologic comfort measures as appropriate  - Advance diet as tolerated, if ordered  - Obtain nutritional consult as needed  - Evaluate fluid balance  Outcome: Progressing  Goal: Maintains or returns to baseline bowel function  Description: INTERVENTIONS:  - Assess bowel function  - Maintain adequate hydration with IV or PO as ordered and tolerated  - Evaluate effectiveness of GI medications  - Encourage mobilization and activity  - Obtain nutritional consult as needed  - Establish a toileting routine/schedule  - Consider collaborating with pharmacy to review patient's medication profile  Outcome: Progressing  Goal: Maintains adequate nutritional intake (undernourished)  Description: INTERVENTIONS:  - Monitor percentage of each meal consumed  - Identify factors contributing to decreased intake, treat as appropriate  - Assist with meals as needed  - Monitor I&O, WT and lab values  - Obtain nutritional consult as needed  - Optimize oral hygiene and moisture  - Encourage food from home; allow for food preferences  - Enhance eating environment  Outcome: Progressing  Goal: Achieves appropriate nutritional intake (bariatric)  Description: INTERVENTIONS:  - Monitor for over-consumption  - Identify factors contributing to increased intake, treat as appropriate  - Monitor I&O, WT and lab values  - Obtain nutritional consult as needed  - Evaluate psychosocial factors contributing to over-consumption  Outcome: Progressing     Problem: Diabetes/Glucose Control  Goal: Glucose maintained within prescribed range  Description: INTERVENTIONS:  - Monitor Blood Glucose as ordered  - Assess for signs and symptoms of hyperglycemia and hypoglycemia  - Administer ordered medications to maintain glucose within target range  - Assess barriers to adequate nutritional intake and initiate nutrition consult as needed  - Instruct patient on self management of diabetes  Outcome: Progressing

## 2023-02-27 ENCOUNTER — ANESTHESIA (OUTPATIENT)
Dept: ENDOSCOPY | Facility: HOSPITAL | Age: 68
End: 2023-02-27
Payer: MEDICARE

## 2023-02-27 ENCOUNTER — ANESTHESIA EVENT (OUTPATIENT)
Dept: ENDOSCOPY | Facility: HOSPITAL | Age: 68
End: 2023-02-27
Payer: MEDICARE

## 2023-02-27 VITALS
RESPIRATION RATE: 20 BRPM | WEIGHT: 114.88 LBS | OXYGEN SATURATION: 99 % | DIASTOLIC BLOOD PRESSURE: 65 MMHG | TEMPERATURE: 98 F | SYSTOLIC BLOOD PRESSURE: 117 MMHG | BODY MASS INDEX: 19 KG/M2 | HEART RATE: 75 BPM

## 2023-02-27 LAB
ANION GAP SERPL CALC-SCNC: 8 MMOL/L (ref 0–18)
BUN BLD-MCNC: 14 MG/DL (ref 7–18)
CALCIUM BLD-MCNC: 9.3 MG/DL (ref 8.5–10.1)
CHLORIDE SERPL-SCNC: 105 MMOL/L (ref 98–112)
CO2 SERPL-SCNC: 27 MMOL/L (ref 21–32)
CREAT BLD-MCNC: 0.56 MG/DL
ERYTHROCYTE [DISTWIDTH] IN BLOOD BY AUTOMATED COUNT: 13.1 %
GFR SERPLBLD BASED ON 1.73 SQ M-ARVRAT: 100 ML/MIN/1.73M2 (ref 60–?)
GLUCOSE BLD-MCNC: 100 MG/DL (ref 70–99)
GLUCOSE BLD-MCNC: 78 MG/DL (ref 70–99)
GLUCOSE BLD-MCNC: 88 MG/DL (ref 70–99)
GLUCOSE BLD-MCNC: 98 MG/DL (ref 70–99)
HCT VFR BLD AUTO: 44.1 %
HGB BLD-MCNC: 14.9 G/DL
INR BLD: 0.96 (ref 0.85–1.16)
MCH RBC QN AUTO: 29 PG (ref 26–34)
MCHC RBC AUTO-ENTMCNC: 33.8 G/DL (ref 31–37)
MCV RBC AUTO: 86 FL
OSMOLALITY SERPL CALC.SUM OF ELEC: 291 MOSM/KG (ref 275–295)
PLATELET # BLD AUTO: 194 10(3)UL (ref 150–450)
POTASSIUM SERPL-SCNC: 3.7 MMOL/L (ref 3.5–5.1)
PROTHROMBIN TIME: 12.8 SECONDS (ref 11.6–14.8)
RBC # BLD AUTO: 5.13 X10(6)UL
SARS-COV-2 RNA RESP QL NAA+PROBE: NOT DETECTED
SODIUM SERPL-SCNC: 140 MMOL/L (ref 136–145)
WBC # BLD AUTO: 5.1 X10(3) UL (ref 4–11)

## 2023-02-27 PROCEDURE — 0DJ08ZZ INSPECTION OF UPPER INTESTINAL TRACT, VIA NATURAL OR ARTIFICIAL OPENING ENDOSCOPIC: ICD-10-PCS | Performed by: INTERNAL MEDICINE

## 2023-02-27 PROCEDURE — 99239 HOSP IP/OBS DSCHRG MGMT >30: CPT | Performed by: INTERNAL MEDICINE

## 2023-02-27 PROCEDURE — 0DD68ZX EXTRACTION OF STOMACH, VIA NATURAL OR ARTIFICIAL OPENING ENDOSCOPIC, DIAGNOSTIC: ICD-10-PCS | Performed by: INTERNAL MEDICINE

## 2023-02-27 RX ORDER — NALOXONE HYDROCHLORIDE 0.4 MG/ML
80 INJECTION, SOLUTION INTRAMUSCULAR; INTRAVENOUS; SUBCUTANEOUS AS NEEDED
Status: DISCONTINUED | OUTPATIENT
Start: 2023-02-27 | End: 2023-02-27

## 2023-02-27 RX ORDER — LIDOCAINE HYDROCHLORIDE 10 MG/ML
INJECTION, SOLUTION EPIDURAL; INFILTRATION; INTRACAUDAL; PERINEURAL AS NEEDED
Status: DISCONTINUED | OUTPATIENT
Start: 2023-02-27 | End: 2023-02-27 | Stop reason: SURG

## 2023-02-27 RX ORDER — SODIUM CHLORIDE, SODIUM LACTATE, POTASSIUM CHLORIDE, CALCIUM CHLORIDE 600; 310; 30; 20 MG/100ML; MG/100ML; MG/100ML; MG/100ML
INJECTION, SOLUTION INTRAVENOUS CONTINUOUS PRN
Status: DISCONTINUED | OUTPATIENT
Start: 2023-02-27 | End: 2023-02-27 | Stop reason: SURG

## 2023-02-27 RX ORDER — SODIUM CHLORIDE, SODIUM LACTATE, POTASSIUM CHLORIDE, CALCIUM CHLORIDE 600; 310; 30; 20 MG/100ML; MG/100ML; MG/100ML; MG/100ML
INJECTION, SOLUTION INTRAVENOUS CONTINUOUS
Status: DISCONTINUED | OUTPATIENT
Start: 2023-02-27 | End: 2023-02-27

## 2023-02-27 RX ADMIN — LIDOCAINE HYDROCHLORIDE 50 MG: 10 INJECTION, SOLUTION EPIDURAL; INFILTRATION; INTRACAUDAL; PERINEURAL at 16:44:00

## 2023-02-27 RX ADMIN — SODIUM CHLORIDE, SODIUM LACTATE, POTASSIUM CHLORIDE, CALCIUM CHLORIDE: 600; 310; 30; 20 INJECTION, SOLUTION INTRAVENOUS at 16:37:00

## 2023-02-27 NOTE — PLAN OF CARE
Assumed care of patient @ 0730 patient resting in bed, AOX4, reports no pain. Lung sounds clear but diminished bilaterally, O2 saturation maintained on room air. No complaints of shortness of breath or chest pain. NSR on tele, S1-S2 present, no adventitious sounds noted. Bowel sounds present and active in all quadrants. Patient voiding with no issue. Pt ambulating in [place] with steady gait. No skin issues noted. Plan of Care: EGD this afternoon, possible discharge afterward. Discussed plan of care with patient, verbalized understanding. Call light within reach.         Problem: GASTROINTESTINAL - ADULT  Goal: Minimal or absence of nausea and vomiting  Description: INTERVENTIONS:  - Maintain adequate hydration with IV or PO as ordered and tolerated  - Nasogastric tube to low intermittent suction as ordered  - Evaluate effectiveness of ordered antiemetic medications  - Provide nonpharmacologic comfort measures as appropriate  - Advance diet as tolerated, if ordered  - Obtain nutritional consult as needed  - Evaluate fluid balance  Outcome: Progressing  Goal: Maintains or returns to baseline bowel function  Description: INTERVENTIONS:  - Assess bowel function  - Maintain adequate hydration with IV or PO as ordered and tolerated  - Evaluate effectiveness of GI medications  - Encourage mobilization and activity  - Obtain nutritional consult as needed  - Establish a toileting routine/schedule  - Consider collaborating with pharmacy to review patient's medication profile  Outcome: Progressing  Goal: Maintains adequate nutritional intake (undernourished)  Description: INTERVENTIONS:  - Monitor percentage of each meal consumed  - Identify factors contributing to decreased intake, treat as appropriate  - Assist with meals as needed  - Monitor I&O, WT and lab values  - Obtain nutritional consult as needed  - Optimize oral hygiene and moisture  - Encourage food from home; allow for food preferences  - Enhance eating environment  Outcome: Progressing  Goal: Achieves appropriate nutritional intake (bariatric)  Description: INTERVENTIONS:  - Monitor for over-consumption  - Identify factors contributing to increased intake, treat as appropriate  - Monitor I&O, WT and lab values  - Obtain nutritional consult as needed  - Evaluate psychosocial factors contributing to over-consumption  Outcome: Progressing     Problem: Diabetes/Glucose Control  Goal: Glucose maintained within prescribed range  Description: INTERVENTIONS:  - Monitor Blood Glucose as ordered  - Assess for signs and symptoms of hyperglycemia and hypoglycemia  - Administer ordered medications to maintain glucose within target range  - Assess barriers to adequate nutritional intake and initiate nutrition consult as needed  - Instruct patient on self management of diabetes  Outcome: Progressing

## 2023-02-27 NOTE — PLAN OF CARE
Assumed care around 1930. A/Ox4. On RA w/ sats >90. Monitor shows NSR. Continent of bowel and bladder. No reports of pain. Up ad omid, steady gait noted. Plan for EGD tmrw, pt NPO at 0000. Safety precautions in place, call light within reach.      Problem: GASTROINTESTINAL - ADULT  Goal: Minimal or absence of nausea and vomiting  Description: INTERVENTIONS:  - Maintain adequate hydration with IV or PO as ordered and tolerated  - Nasogastric tube to low intermittent suction as ordered  - Evaluate effectiveness of ordered antiemetic medications  - Provide nonpharmacologic comfort measures as appropriate  - Advance diet as tolerated, if ordered  - Obtain nutritional consult as needed  - Evaluate fluid balance  2/26/2023 2319 by Jada Palomo RN  Outcome: Progressing  2/26/2023 2318 by Jada Palomo RN  Outcome: Progressing  Goal: Maintains or returns to baseline bowel function  Description: INTERVENTIONS:  - Assess bowel function  - Maintain adequate hydration with IV or PO as ordered and tolerated  - Evaluate effectiveness of GI medications  - Encourage mobilization and activity  - Obtain nutritional consult as needed  - Establish a toileting routine/schedule  - Consider collaborating with pharmacy to review patient's medication profile  2/26/2023 2319 by Jada Palomo RN  Outcome: Progressing  2/26/2023 2318 by Jada Palomo RN  Outcome: Progressing  Goal: Maintains adequate nutritional intake (undernourished)  Description: INTERVENTIONS:  - Monitor percentage of each meal consumed  - Identify factors contributing to decreased intake, treat as appropriate  - Assist with meals as needed  - Monitor I&O, WT and lab values  - Obtain nutritional consult as needed  - Optimize oral hygiene and moisture  - Encourage food from home; allow for food preferences  - Enhance eating environment  2/26/2023 2319 by Jada Palomo RN  Outcome: Progressing  2/26/2023 2318 by Jada Palomo RN  Outcome: Progressing  Goal: Achieves appropriate nutritional intake (bariatric)  Description: INTERVENTIONS:  - Monitor for over-consumption  - Identify factors contributing to increased intake, treat as appropriate  - Monitor I&O, WT and lab values  - Obtain nutritional consult as needed  - Evaluate psychosocial factors contributing to over-consumption  2/26/2023 2319 by Mj Sheffield RN  Outcome: Miguelito Mcdermott  2/26/2023 2318 by Mj Sheffield RN  Outcome: Progressing     Problem: Diabetes/Glucose Control  Goal: Glucose maintained within prescribed range  Description: INTERVENTIONS:  - Monitor Blood Glucose as ordered  - Assess for signs and symptoms of hyperglycemia and hypoglycemia  - Administer ordered medications to maintain glucose within target range  - Assess barriers to adequate nutritional intake and initiate nutrition consult as needed  - Instruct patient on self management of diabetes  2/26/2023 2319 by Mj Sheffield RN  Outcome: Progressing  2/26/2023 2318 by Mj Sheffield RN  Outcome: Progressing

## 2023-02-27 NOTE — INTERVAL H&P NOTE
Pre-op Diagnosis: Abdominal pain [R10.9]    The above referenced H&P was reviewed by Chong Perez DO on 2/27/2023, the patient was examined and no significant changes have occurred in the patient's condition since the H&P was performed. I discussed with the patient and/or legal representative the potential benefits, risks and side effects of this procedure; the likelihood of the patient achieving goals; and potential problems that might occur during recuperation. I discussed reasonable alternatives to the procedure, including risks, benefits and side effects related to the alternatives and risks related to not receiving this procedure. We will proceed with procedure as planned.

## 2023-02-28 ENCOUNTER — TELEPHONE (OUTPATIENT)
Dept: PHYSICAL THERAPY | Facility: HOSPITAL | Age: 68
End: 2023-02-28

## 2023-02-28 ENCOUNTER — PATIENT OUTREACH (OUTPATIENT)
Dept: CASE MANAGEMENT | Age: 68
End: 2023-02-28

## 2023-02-28 DIAGNOSIS — Z02.9 ENCOUNTERS FOR ADMINISTRATIVE PURPOSE: ICD-10-CM

## 2023-02-28 PROCEDURE — 1111F DSCHRG MED/CURRENT MED MERGE: CPT

## 2023-02-28 NOTE — PROGRESS NOTES
Assumed care of patient at 1900. Family members at bedside. Pt is eating and tolerating it well. Vital signs stable. Discharging home with supervision from family tonight. Discharge paperwork explained to patient at shift change. All questions answered. IV removed. Pt wheeled down to [place] entrance via wheelchair with RN. All needs met.

## 2023-03-02 ENCOUNTER — OFFICE VISIT (OUTPATIENT)
Dept: INTERNAL MEDICINE CLINIC | Facility: CLINIC | Age: 68
End: 2023-03-02
Payer: MEDICARE

## 2023-03-02 ENCOUNTER — TELEPHONE (OUTPATIENT)
Dept: PHYSICAL THERAPY | Facility: HOSPITAL | Age: 68
End: 2023-03-02

## 2023-03-02 VITALS
DIASTOLIC BLOOD PRESSURE: 64 MMHG | HEIGHT: 64.57 IN | OXYGEN SATURATION: 96 % | SYSTOLIC BLOOD PRESSURE: 120 MMHG | BODY MASS INDEX: 19.87 KG/M2 | WEIGHT: 117.81 LBS | HEART RATE: 78 BPM

## 2023-03-02 DIAGNOSIS — C49.A0 GASTROINTESTINAL STROMAL TUMOR (GIST) (HCC): Primary | ICD-10-CM

## 2023-03-02 DIAGNOSIS — E03.8 SUBCLINICAL HYPOTHYROIDISM: ICD-10-CM

## 2023-03-02 PROCEDURE — 1111F DSCHRG MED/CURRENT MED MERGE: CPT | Performed by: INTERNAL MEDICINE

## 2023-03-02 PROCEDURE — 99496 TRANSJ CARE MGMT HIGH F2F 7D: CPT | Performed by: INTERNAL MEDICINE

## 2023-03-03 ENCOUNTER — TELEPHONE (OUTPATIENT)
Dept: HEMATOLOGY/ONCOLOGY | Facility: HOSPITAL | Age: 68
End: 2023-03-03

## 2023-03-03 NOTE — TELEPHONE ENCOUNTER
----- Message from Patience Modi RN sent at 3/2/2023  4:34 PM CST -----  Regarding: RE: New Consult  He doesn't have anything sooner. If one of the other's do that should be fine. ----- Message -----  From: Emily Gomez  Sent: 3/2/2023   4:26 PM CST  To: Bjorn Poole Rns  Subject: New Consult                                      Pt has called twice, path is back and wants sooner appt. Can I offer anything sooner or should I get her in with Laura Fernandez or Denilson Villar?

## 2023-03-08 ENCOUNTER — APPOINTMENT (OUTPATIENT)
Dept: PHYSICAL THERAPY | Facility: HOSPITAL | Age: 68
End: 2023-03-08
Attending: INTERNAL MEDICINE
Payer: MEDICARE

## 2023-03-08 ENCOUNTER — OFFICE VISIT (OUTPATIENT)
Dept: HEMATOLOGY/ONCOLOGY | Facility: HOSPITAL | Age: 68
End: 2023-03-08
Attending: INTERNAL MEDICINE
Payer: MEDICARE

## 2023-03-08 VITALS
SYSTOLIC BLOOD PRESSURE: 134 MMHG | OXYGEN SATURATION: 97 % | WEIGHT: 118.25 LBS | BODY MASS INDEX: 20 KG/M2 | RESPIRATION RATE: 16 BRPM | HEART RATE: 82 BPM | TEMPERATURE: 98 F | DIASTOLIC BLOOD PRESSURE: 79 MMHG

## 2023-03-08 DIAGNOSIS — C49.A2 MALIGNANT GASTROINTESTINAL STROMAL TUMOR (GIST) OF STOMACH (HCC): Primary | ICD-10-CM

## 2023-03-08 PROCEDURE — 99205 OFFICE O/P NEW HI 60 MIN: CPT | Performed by: INTERNAL MEDICINE

## 2023-03-08 NOTE — PROGRESS NOTES
Outpatient Oncology Care Plan  Problem list:  knowledge deficit    Problems related to:    combined modality therapy  disease/disease progression    Interventions:  provided general teaching    Expected outcomes:  understands plan of care    Progress towards outcome:  making progress    Education Record    Learner:  Patient and Family Member  Barriers / Limitations:  None  Method:  Brief focused  Outcome:  Shows understanding  Comments: Consult referred by Dr. Pari Bates to discuss path results. Pt states she went to ER with dizziness, leg cramping and vomitting. States imaging showed mass in abd and biopsy was taken. Pt denies any pain and states she has been eating and drinking well.

## 2023-03-13 ENCOUNTER — APPOINTMENT (OUTPATIENT)
Dept: PHYSICAL THERAPY | Facility: HOSPITAL | Age: 68
End: 2023-03-13
Attending: INTERNAL MEDICINE
Payer: MEDICARE

## 2023-03-13 PROBLEM — E11.9 DIABETES MELLITUS TYPE 2 WITHOUT RETINOPATHY (HCC): Status: ACTIVE | Noted: 2018-02-24

## 2023-03-15 ENCOUNTER — OFFICE VISIT (OUTPATIENT)
Dept: SURGERY | Facility: CLINIC | Age: 68
End: 2023-03-15
Payer: MEDICARE

## 2023-03-15 ENCOUNTER — APPOINTMENT (OUTPATIENT)
Dept: PHYSICAL THERAPY | Facility: HOSPITAL | Age: 68
End: 2023-03-15
Attending: INTERNAL MEDICINE
Payer: MEDICARE

## 2023-03-15 VITALS
DIASTOLIC BLOOD PRESSURE: 68 MMHG | SYSTOLIC BLOOD PRESSURE: 126 MMHG | BODY MASS INDEX: 20 KG/M2 | TEMPERATURE: 98 F | OXYGEN SATURATION: 96 % | HEART RATE: 84 BPM | RESPIRATION RATE: 16 BRPM | WEIGHT: 118 LBS

## 2023-03-15 DIAGNOSIS — E11.9 DIABETES MELLITUS TYPE 2 WITHOUT RETINOPATHY (HCC): ICD-10-CM

## 2023-03-15 DIAGNOSIS — C49.A2 GASTROINTESTINAL STROMAL TUMOR (GIST) OF STOMACH (HCC): Primary | ICD-10-CM

## 2023-03-15 DIAGNOSIS — Z01.818 PRE-OP TESTING: ICD-10-CM

## 2023-03-15 NOTE — PATIENT INSTRUCTIONS
Surgery:  Laparoscopic, robotic assisted, possible open partial gastrectomy    Date of Surgery:    [facility]    [facility] [address] [facility], [address]  Phone: [phone]    This is an Inpatient procedure. Use the provided Chlorhexadine surgical soap(instructions attached) to shower the night before and morning of your procedure. Do not apply powders, creams, lotions or deodorant after showering. Do not apply any kind of makeup and make sure to remove nail polish prior to your surgery. For faster recovery from anesthesia and surgery please follow the instructions below regarding your pre-op diet:  12 hours prior to your surgery time you are to drink one 10oz bottle of Ensure Pre-Surgery Drink. You are to have NO solid food or water after 11pm the night before your surgery EXCEPT one additional 10oz bottle of Ensure Pre-Surgery Drink. You need to finish drinking this 4 hours prior to surgery time. Take Tylenol 1000mg by mouth at the time of your second Ensure Pre-Surgery drink(4hrs prior to surgery time), unless instructed otherwise by your surgeon. Bowel Prep: No   If you take Insulin contact your primary care physician for specific instructions regarding dosing around your surgery. Do not drink alcohol or smoke tobacco products 24 hours prior to procedure. Bring your picture ID and insurance card with you. Wear comfortable clothing that can easily be removed. Preferably, something that zips, snaps, or buttons up the front. You will be contacted by the hospital  for pre-admission COVID-19 testing and the day prior to your surgery to confirm details and give you specific instructions about when and where to arrive the day of your procedure. If you are taking blood thinners including: Plavix, Eliquis, Xarelto, Coumadin, full strength Aspirin you will need to contact the prescribing provider for specific instructions on holding these medications for your procedure.   Inform your primary care physician of your surgery and ask if him/her will need to see you prior to surgery. If you develop symptoms of another illness prior to surgery please contact our office immediately. If this is an inpatient surgery, attending the Surgical Oncology Pre-operative Education Class is strongly encouraged. Email [email or web address] to RSVP or for more class information. Pre-Operative Testing  x CBC x CMP  BMP    PT, PTT, INR  UA x EKG    Chest X-Ray  Cardiac Clearance x H & P Medical Clearance     Geeta Esparza MD, [facility]  Chief of Surgical Oncology  Co-Medical Director, Colonel Jones  Professor of Surgery    Dr. Mari Lara nurse line: [phone]   Monday through Friday 8:00am to 4:30pm.     For Dr. Mari Lara office: [phone]/ Fax: [phone]  After hours you will reach the answering service    Pre-Admission Testing:  Barbie Dorantes 1874 [phone]  Medical Records:   [phone]

## 2023-03-16 ENCOUNTER — HOSPITAL ENCOUNTER (OUTPATIENT)
Dept: NUCLEAR MEDICINE | Facility: HOSPITAL | Age: 68
Discharge: HOME | End: 2023-03-16
Attending: INTERNAL MEDICINE
Payer: MEDICARE

## 2023-03-16 ENCOUNTER — TELEPHONE (OUTPATIENT)
Dept: SURGERY | Facility: CLINIC | Age: 68
End: 2023-03-16

## 2023-03-16 ENCOUNTER — TELEPHONE (OUTPATIENT)
Dept: HEMATOLOGY/ONCOLOGY | Facility: HOSPITAL | Age: 68
End: 2023-03-16

## 2023-03-16 DIAGNOSIS — C49.A2 MALIGNANT GASTROINTESTINAL STROMAL TUMOR (GIST) OF STOMACH (HCC): ICD-10-CM

## 2023-03-16 DIAGNOSIS — C49.A2 GASTROINTESTINAL STROMAL TUMOR (GIST) OF STOMACH (HCC): Primary | ICD-10-CM

## 2023-03-16 LAB — GLUCOSE BLD-MCNC: 93 MG/DL (ref 70–99)

## 2023-03-16 PROCEDURE — 78815 PET IMAGE W/CT SKULL-THIGH: CPT | Performed by: INTERNAL MEDICINE

## 2023-03-16 PROCEDURE — 82962 GLUCOSE BLOOD TEST: CPT

## 2023-03-16 NOTE — TELEPHONE ENCOUNTER
Edwin Wang [phone] has her Pet Scan results  would like  to call her back to discuss her results or she can't sleep tonight.  Thanks [employer]

## 2023-03-16 NOTE — TELEPHONE ENCOUNTER
Calling patient to discuss surgery scheduling. Confirmed surgery date of 05/04/2023 at [facility] with Dr. Cassandra Ramirez. Patient was worried about the date being so far out, I let her know this is my soonest availability and it was reviewed by Dr. Pérez Nose team. I let her know as well that Anali Jony would be calling her to go over any medical questions or concerns she might have. Patient agreed with plan.

## 2023-03-20 NOTE — PROGRESS NOTES
GASTROENTEROLOGY CONSULTATION      Date of Admission:  3/15/2023          ASSESSMENT AND RECOMMENDATIONS:     Pre-LVAD Colon Cancer Screening     55 year old male with a history of HFrEF (EF 10% 5/2022) 2/2 NICM s/p ICD, tobacco use disorder, marijuana use, HTN, lumbar radiculopathy, CKD who presents after being found down at home. Found to have had a prolonged episode of VT with concern for cardiac arrest s/p ROSC in the field for which he was admitted and is being managed by cardiology. Cardiology team with plans for LVAD placement on Thursday 3/23 and have consulted our team for pre LVAD screening colonoscopy.    Patient denies previous colonoscopy or colon cancer screening. He denies any family history of colon cancer. He denies melena, hematochezia or any concerning GI related symptoms.     Benefit and risk of colonoscopy discussed with the patient and he is agreeable with proceeding with colonoscopy, patient still getting diuresed and not quite optimized for a colonoscopy, hence will plan for a colonoscopy Wednesday 3/22 before his planned LVAD on Thursday 3/23    RECOMMENDATIONS  - Colonoscopy Wednesday 3/22  - Clear liquids, no red coloring tomorrow  - Colonoscopy prep  - Give 4L Go-Lytely tomorrow at 1600  - Give 2L Go-Lytely on Wednesday 3/21 at 0300  - If not clear by 0500, please give an additional 2L Go-Lytely  - NPO at midnight except medications.  - Hold all anti-coagulant medications pre-procedurally     Thank you for involving us in this patient's care. Please do not hesitate to contact the GI service with any questions or concerns.     Patient care plan discussed with Dr. Crespo, GI staff physician.    Aneudy Brock MD  Gastroenterology Fellow  Pager             Chief Complaint:   We were asked by Marco Santoyo MD of the cardiology team to evaluate this patient for pre LVAD colonoscopy.     History is obtained from the patient and the medical record.          History of Present  Discharge Summary  Pt has attended 8 visits in Physical Therapy   .  Dx: TMJ (temporomandibular joint disorder) (M26.609)            Insurance (Authorized # of Visits):  6           Authorizing Physician: Dr. Mary Kraft  Next MD visit: none scheduled  Fall Ris 4/ Date:         9/12/19        TX#: 5/ Date: 9/  Tx#: 6/ Date: 9/19/19  Tx#: 7/ Date: 9/24/19   Tx#: 8/ 9/30/19  10/7/19   UBE x4' (2,2)  UBE x4' (2,2) UBE x4' (2,2) UBE x4' (2,2) UBE x4' (2,2) UBE x4' (2,2) UBE x4' (2,2) UBE x4' (2,2) UBE x4' (2,2)   foa Illness:   Akshat Fragoso is a 55 year old male with a history of HFrEF (EF 10% 5/2022) 2/2 NICM s/p ICD, tobacco use disorder, marijuana use, HTN, lumbar radiculopathy, CKD who presents after being found down at home. Found to have had a prolonged episode of VT with concern for cardiac arrest s/p ROSC in the field for which he was admitted and is being managed by cardiology. Cardiology team with plans for LVAD placement on Thursday 3/23 and have consulted our team for pre LVAD screening colonoscopy.    Patient denies previous colonoscopy or colon cancer screening. He denies any family history of colon cancer. He denies melena, hematochezia or any concerning GI related symptoms.         Past Medical History:   Reviewed and edited as appropriate  Past Medical History:   Diagnosis Date     Acute right lumbar radiculopathy 11/02/2015     Acute systolic heart failure (H) 01/10/2017     Cardiomyopathy (H) 01/10/2017     CKD (chronic kidney disease) stage 3, GFR 30-59 ml/min (H) 01/30/2020     JOHNSON (dyspnea on exertion)      ED (erectile dysfunction) 10/02/2019     Erectile dysfunction      ICD (implantable cardioverter-defibrillator) in place- [email or web address], single chamber- NOT dependent 10/09/2017     Personal history of smoking 01/04/2017     Pulmonary nodules 01/17/2017    CT 11/2018:  Bilateral pulmonary nodules measuring up to 4 mm. No new or enlarging pulmonary nodules.            Past Surgical History:   Reviewed and edited as appropriate   Past Surgical History:   Procedure Laterality Date     CARDIAC SURGERY  2016    defibrillator placement     CV RIGHT HEART CATH MEASUREMENTS RECORDED N/A 11/20/2019    Procedure: CV RIGHT HEART CATH;  Surgeon: Jeff Aguilar MD;  Location: [facility] CARDIAC CATH LAB     None              Previous Endoscopy:   No results found for this or any previous visit.         Social History:   Reviewed and edited as appropriate  Social History     Socioeconomic History     Marital  "status:      Spouse name: Cheryl     Number of children: 2     Years of education: Not on file     Highest education level: Not on file   Occupational History     Occupation: Construction      Employer: SELF   Tobacco Use     Smoking status: Light Smoker     Packs/day: 0.25     Years: 15.00     Pack years: 3.75     Types: Cigarettes     Smokeless tobacco: Former     Quit date: 10/24/2011   Vaping Use     Vaping Use: Never used   Substance and Sexual Activity     Alcohol use: No     Alcohol/week: 0.0 standard drinks     Drug use: No     Sexual activity: Yes     Partners: Female     Birth control/protection: Condom   Other Topics Concern     Parent/sibling w/ CABG, MI or angioplasty before 65F 55M? Yes     Comment: both parents had stints placed    Social History Narrative     Not on file     Social Determinants of Health     Financial Resource Strain: Not on file   Food Insecurity: Not on file   Transportation Needs: Not on file   Physical Activity: Not on file   Stress: Not on file   Social Connections: Not on file   Intimate Partner Violence: Not on file   Housing Stability: Not on file            Family History:   Reviewed and edited as appropriate       Allergies:   Reviewed and edited as appropriate   No Known Allergies         Review of Systems:     A complete review of systems was performed and is negative except as noted in the HPI           Physical Exam:   BP 92/56   Pulse 113   Temp 96.8  F (36  C) (Axillary)   Resp 16   Ht 1.702 m (5' 7\")   Wt 72.2 kg (159 lb 2.8 oz)   SpO2 93%   BMI 24.93 kg/m    Wt:   Wt Readings from Last 2 Encounters:   03/20/23 72.2 kg (159 lb 2.8 oz)   01/28/21 87.5 kg (193 lb)      Constitutional: cooperative, pleasant, not dyspneic/diaphoretic, no acute distress  Eyes: Sclera anicteric/injected  Ears/nose/mouth/throat: Mucus membranes moist  Neck: supple  CV: RRR  Respiratory: Unlabored breathing  Abdomen:Nondistended, no hepatosplenomegaly, nontender, no " toes)  SB neck isometrics x10  DNF x20 (look down at toes)  SB neck isometrics x10   STM to masseters  Suboccipitals  SCM  Scalenes  UT x10'   1/2 foam roll super 6 x10 reps each    1/2 foam roll super 6 x10 reps each/given  For HEP  1/2 foam roll super 6 peritoneal signs  Skin: warm, perfused, no jaundice  Neuro: AAO x 3  Psych: Normal affect         Data:   Labs and imaging below were independently reviewed and interpreted    BMP  Recent Labs   Lab 03/20/23  1420 03/20/23  0409 03/19/23  1550 03/19/23  0408   * 134* 130* 137   POTASSIUM 3.9 3.7 4.3  4.3 2.8*   CHLORIDE 88* 93* 90* 99   TONY 9.2 8.0* 9.0 7.0*   CO2 30* 25 28 21*   BUN 24.8* 18.4 18.4 16.9   CR 1.03 0.75 0.89 0.68  0.68   * 107* 168* 96     CBC  Recent Labs   Lab 03/19/23  0945 03/19/23  0411 03/18/23  0431 03/15/23  2007 03/15/23  2003   WBC 16.4* 15.6* 15.2*  --  12.2*   RBC 5.25 4.82 5.04  --  5.05   HGB 15.8 14.6 15.2 16.3 15.3   HCT 49.2 45.9 47.0 48 49.0   MCV 94 95 93  --  97   MCH 30.1 30.3 30.2  --  30.3   MCHC 32.1 31.8 32.3  --  31.2*   RDW 14.6 14.6 14.2  --  14.4   * 122*  122* 130*  --  200     INR  Recent Labs   Lab 03/20/23  1420 03/18/23  0431 03/15/23  2003   INR 1.30* 1.18* 1.13     LFTs  Recent Labs   Lab 03/18/23  0431 03/16/23  2000 03/15/23  2149 03/15/23  2003   ALKPHOS 74 78 73 82   AST 43 34 34 35   ALT 27 26 22 24   BILITOTAL 1.5* 1.1 0.7 0.7   PROTTOTAL 6.1* 6.0* 5.7* 6.0*   ALBUMIN 3.7 3.9 3.6 3.8      PANCNo lab results found in last 7 days.

## 2023-03-22 ENCOUNTER — APPOINTMENT (OUTPATIENT)
Dept: PHYSICAL THERAPY | Facility: HOSPITAL | Age: 68
End: 2023-03-22
Attending: INTERNAL MEDICINE
Payer: MEDICARE

## 2023-03-24 ENCOUNTER — APPOINTMENT (OUTPATIENT)
Dept: PHYSICAL THERAPY | Facility: HOSPITAL | Age: 68
End: 2023-03-24
Attending: INTERNAL MEDICINE
Payer: MEDICARE

## 2023-03-27 ENCOUNTER — APPOINTMENT (OUTPATIENT)
Dept: PHYSICAL THERAPY | Facility: HOSPITAL | Age: 68
End: 2023-03-27
Attending: INTERNAL MEDICINE
Payer: MEDICARE

## 2023-03-31 RX ORDER — IBUPROFEN 200 MG
200 TABLET ORAL EVERY 6 HOURS PRN
COMMUNITY

## 2023-03-31 NOTE — PAT NURSING NOTE
Message left on Dr Bertrand Roy nurse line at ext [phone] at 10:49 3/31/23 to inform of medication contraindication for heparin.

## 2023-03-31 NOTE — PAT NURSING NOTE
Message left on Dr Kelle Patel nurse line at ext [phone] at 11:00 3/31/23 to inform of medication contraindication/allergy to mefoxin, gentamycin and clindamycin.

## 2023-04-12 ENCOUNTER — OFFICE VISIT (OUTPATIENT)
Dept: SURGERY | Facility: CLINIC | Age: 68
End: 2023-04-12
Payer: MEDICARE

## 2023-04-12 VITALS
WEIGHT: 122 LBS | HEART RATE: 79 BPM | OXYGEN SATURATION: 96 % | DIASTOLIC BLOOD PRESSURE: 85 MMHG | SYSTOLIC BLOOD PRESSURE: 128 MMHG | BODY MASS INDEX: 21 KG/M2 | RESPIRATION RATE: 16 BRPM

## 2023-04-12 DIAGNOSIS — R11.0 NAUSEA: Primary | ICD-10-CM

## 2023-04-12 DIAGNOSIS — C49.A2 GASTROINTESTINAL STROMAL TUMOR (GIST) OF STOMACH (HCC): ICD-10-CM

## 2023-04-12 PROCEDURE — 99213 OFFICE O/P EST LOW 20 MIN: CPT | Performed by: SURGERY

## 2023-04-12 PROCEDURE — 1126F AMNT PAIN NOTED NONE PRSNT: CPT | Performed by: SURGERY

## 2023-04-12 RX ORDER — PROCHLORPERAZINE MALEATE 10 MG
5 TABLET ORAL EVERY 6 HOURS PRN
Qty: 15 TABLET | Refills: 0 | Status: SHIPPED | OUTPATIENT
Start: 2023-04-12 | End: 2023-04-12

## 2023-04-12 RX ORDER — ONDANSETRON 4 MG/1
4 TABLET, ORALLY DISINTEGRATING ORAL EVERY 6 HOURS PRN
Qty: 15 TABLET | Refills: 0 | Status: SHIPPED | OUTPATIENT
Start: 2023-04-12

## 2023-04-12 NOTE — H&P (VIEW-ONLY)
Patient and son returned today with questions. I did not examine her on this date. She had a list of questions which I answered to her satisfaction. I spent about half an hour in face-to-face discussion. To proceed as planned.

## 2023-04-18 ENCOUNTER — MED REC SCAN ONLY (OUTPATIENT)
Dept: INTERNAL MEDICINE CLINIC | Facility: CLINIC | Age: 68
End: 2023-04-18

## 2023-04-18 ENCOUNTER — TELEPHONE (OUTPATIENT)
Dept: HEMATOLOGY/ONCOLOGY | Facility: HOSPITAL | Age: 68
End: 2023-04-18

## 2023-04-18 ENCOUNTER — OFFICE VISIT (OUTPATIENT)
Dept: INTERNAL MEDICINE CLINIC | Facility: CLINIC | Age: 68
End: 2023-04-18
Payer: MEDICARE

## 2023-04-18 VITALS
WEIGHT: 116.63 LBS | HEIGHT: 65 IN | SYSTOLIC BLOOD PRESSURE: 110 MMHG | TEMPERATURE: 97 F | BODY MASS INDEX: 19.43 KG/M2 | HEART RATE: 80 BPM | DIASTOLIC BLOOD PRESSURE: 76 MMHG | OXYGEN SATURATION: 97 %

## 2023-04-18 DIAGNOSIS — Z01.818 PREOP EXAMINATION: Primary | ICD-10-CM

## 2023-04-18 DIAGNOSIS — C49.A0 GASTROINTESTINAL STROMAL TUMOR (GIST) (HCC): ICD-10-CM

## 2023-04-18 DIAGNOSIS — R73.03 PREDIABETES: ICD-10-CM

## 2023-04-18 DIAGNOSIS — E03.8 SUBCLINICAL HYPOTHYROIDISM: ICD-10-CM

## 2023-04-18 LAB
ATRIAL RATE: 76 BPM
P AXIS: 64 DEGREES
P-R INTERVAL: 144 MS
Q-T INTERVAL: 400 MS
QRS DURATION: 88 MS
QTC CALCULATION (BEZET): 450 MS
R AXIS: -22 DEGREES
T AXIS: 59 DEGREES
VENTRICULAR RATE: 76 BPM

## 2023-04-18 PROCEDURE — 99214 OFFICE O/P EST MOD 30 MIN: CPT | Performed by: INTERNAL MEDICINE

## 2023-04-18 PROCEDURE — 93000 ELECTROCARDIOGRAM COMPLETE: CPT | Performed by: INTERNAL MEDICINE

## 2023-04-18 RX ORDER — PROCHLORPERAZINE MALEATE 10 MG
TABLET ORAL
COMMUNITY
Start: 2023-04-12

## 2023-04-18 NOTE — TELEPHONE ENCOUNTER
Called patient. She wanted to discuss her surgical options and ended up scheduling an appointment to see Dr Jitendra Jackson tomorrow morning to discuss in person. What Type Of Note Output Would You Prefer (Optional)?: Standard Output How Severe Is Your Skin Lesion?: mild Has Your Skin Lesion Been Treated?: not been treated Is This A New Presentation, Or A Follow-Up?: Skin Lesion

## 2023-04-18 NOTE — TELEPHONE ENCOUNTER
Vonnie would like to speak with doctor she has questions about her procedure and options?  Thanks [employer]

## 2023-04-19 ENCOUNTER — OFFICE VISIT (OUTPATIENT)
Dept: HEMATOLOGY/ONCOLOGY | Facility: HOSPITAL | Age: 68
End: 2023-04-19
Attending: INTERNAL MEDICINE
Payer: MEDICARE

## 2023-04-19 VITALS
OXYGEN SATURATION: 97 % | SYSTOLIC BLOOD PRESSURE: 123 MMHG | BODY MASS INDEX: 20 KG/M2 | RESPIRATION RATE: 20 BRPM | DIASTOLIC BLOOD PRESSURE: 81 MMHG | WEIGHT: 119 LBS | TEMPERATURE: 98 F | HEART RATE: 81 BPM

## 2023-04-19 DIAGNOSIS — C49.A2 MALIGNANT GASTROINTESTINAL STROMAL TUMOR (GIST) OF STOMACH (HCC): Primary | ICD-10-CM

## 2023-04-19 PROCEDURE — 99215 OFFICE O/P EST HI 40 MIN: CPT | Performed by: INTERNAL MEDICINE

## 2023-04-19 NOTE — PROGRESS NOTES
Patient is here today for follow up with Tl Ardon for GIST. Patient denies pain. Stated intermittent nausea. Medication list and medical history were reviewed and updated. Education Record    Learner:  Patient and son    Disease / Diagnosis: GIST    Barriers / Limitations:  None   Comments:    Method:  Brief focused, Discussion, Printed material and Reinforcement   Comments:    General Topics:  Medication, Pain, Procedure and Plan of care reviewed   Comments:    Outcome:  Shows understanding   Comments:      AVS provided and follow up reviewed. Patient instructed to call as needed.

## 2023-04-21 ENCOUNTER — LAB ENCOUNTER (OUTPATIENT)
Dept: LAB | Facility: HOSPITAL | Age: 68
End: 2023-04-21
Attending: INTERNAL MEDICINE
Payer: MEDICARE

## 2023-04-21 ENCOUNTER — LABORATORY ENCOUNTER (OUTPATIENT)
Dept: LAB | Facility: HOSPITAL | Age: 68
End: 2023-04-21
Attending: SURGERY
Payer: MEDICARE

## 2023-04-21 DIAGNOSIS — E03.8 SUBCLINICAL HYPOTHYROIDISM: ICD-10-CM

## 2023-04-21 DIAGNOSIS — C49.A2 GASTROINTESTINAL STROMAL TUMOR (GIST) OF STOMACH (HCC): ICD-10-CM

## 2023-04-21 DIAGNOSIS — Z01.818 PRE-OP TESTING: ICD-10-CM

## 2023-04-21 LAB
ALBUMIN SERPL-MCNC: 4.1 G/DL (ref 3.4–5)
ALBUMIN/GLOB SERPL: 1.1 {RATIO} (ref 1–2)
ALP LIVER SERPL-CCNC: 76 U/L
ALT SERPL-CCNC: 26 U/L
ANION GAP SERPL CALC-SCNC: 1 MMOL/L (ref 0–18)
ANTIBODY SCREEN: NEGATIVE
AST SERPL-CCNC: 18 U/L (ref 15–37)
BASOPHILS # BLD AUTO: 0.03 X10(3) UL (ref 0–0.2)
BASOPHILS NFR BLD AUTO: 0.5 %
BILIRUB SERPL-MCNC: 0.6 MG/DL (ref 0.1–2)
BUN BLD-MCNC: 17 MG/DL (ref 7–18)
CALCIUM BLD-MCNC: 9.6 MG/DL (ref 8.5–10.1)
CHLORIDE SERPL-SCNC: 106 MMOL/L (ref 98–112)
CO2 SERPL-SCNC: 29 MMOL/L (ref 21–32)
CREAT BLD-MCNC: 0.57 MG/DL
EOSINOPHIL # BLD AUTO: 0.01 X10(3) UL (ref 0–0.7)
EOSINOPHIL NFR BLD AUTO: 0.2 %
ERYTHROCYTE [DISTWIDTH] IN BLOOD BY AUTOMATED COUNT: 13.1 %
FASTING STATUS PATIENT QL REPORTED: YES
GFR SERPLBLD BASED ON 1.73 SQ M-ARVRAT: 99 ML/MIN/1.73M2 (ref 60–?)
GLOBULIN PLAS-MCNC: 3.8 G/DL (ref 2.8–4.4)
GLUCOSE BLD-MCNC: 105 MG/DL (ref 70–99)
HCT VFR BLD AUTO: 44.3 %
HGB BLD-MCNC: 14.8 G/DL
IMM GRANULOCYTES # BLD AUTO: 0.01 X10(3) UL (ref 0–1)
IMM GRANULOCYTES NFR BLD: 0.2 %
LYMPHOCYTES # BLD AUTO: 1.36 X10(3) UL (ref 1–4)
LYMPHOCYTES NFR BLD AUTO: 22 %
MCH RBC QN AUTO: 29.3 PG (ref 26–34)
MCHC RBC AUTO-ENTMCNC: 33.4 G/DL (ref 31–37)
MCV RBC AUTO: 87.7 FL
MONOCYTES # BLD AUTO: 0.22 X10(3) UL (ref 0.1–1)
MONOCYTES NFR BLD AUTO: 3.6 %
NEUTROPHILS # BLD AUTO: 4.54 X10 (3) UL (ref 1.5–7.7)
NEUTROPHILS # BLD AUTO: 4.54 X10(3) UL (ref 1.5–7.7)
NEUTROPHILS NFR BLD AUTO: 73.5 %
OSMOLALITY SERPL CALC.SUM OF ELEC: 284 MOSM/KG (ref 275–295)
PLATELET # BLD AUTO: 176 10(3)UL (ref 150–450)
POTASSIUM SERPL-SCNC: 4.2 MMOL/L (ref 3.5–5.1)
PROT SERPL-MCNC: 7.9 G/DL (ref 6.4–8.2)
RBC # BLD AUTO: 5.05 X10(6)UL
RH BLOOD TYPE: POSITIVE
SODIUM SERPL-SCNC: 136 MMOL/L (ref 136–145)
TSI SER-ACNC: 3.19 MIU/ML (ref 0.36–3.74)
WBC # BLD AUTO: 6.2 X10(3) UL (ref 4–11)

## 2023-04-21 PROCEDURE — 80053 COMPREHEN METABOLIC PANEL: CPT

## 2023-04-21 PROCEDURE — 86901 BLOOD TYPING SEROLOGIC RH(D): CPT

## 2023-04-21 PROCEDURE — 86850 RBC ANTIBODY SCREEN: CPT

## 2023-04-21 PROCEDURE — 36415 COLL VENOUS BLD VENIPUNCTURE: CPT

## 2023-04-21 PROCEDURE — 86900 BLOOD TYPING SEROLOGIC ABO: CPT

## 2023-04-21 PROCEDURE — 84443 ASSAY THYROID STIM HORMONE: CPT

## 2023-04-21 PROCEDURE — 85025 COMPLETE CBC W/AUTO DIFF WBC: CPT

## 2023-05-01 ENCOUNTER — LAB ENCOUNTER (OUTPATIENT)
Dept: LAB | Facility: HOSPITAL | Age: 68
End: 2023-05-01
Attending: SURGERY
Payer: MEDICARE

## 2023-05-01 DIAGNOSIS — C49.A2 GASTROINTESTINAL STROMAL TUMOR (GIST) OF STOMACH (HCC): ICD-10-CM

## 2023-05-02 LAB — SARS-COV-2 RNA RESP QL NAA+PROBE: NOT DETECTED

## 2023-05-03 ENCOUNTER — ANESTHESIA EVENT (OUTPATIENT)
Dept: SURGERY | Facility: HOSPITAL | Age: 68
End: 2023-05-03
Payer: MEDICARE

## 2023-05-03 RX ORDER — CLINDAMYCIN PHOSPHATE 900 MG/50ML
900 INJECTION INTRAVENOUS ONCE
Status: CANCELLED | OUTPATIENT
Start: 2023-05-03 | End: 2023-05-03

## 2023-05-04 ENCOUNTER — ANESTHESIA (OUTPATIENT)
Dept: SURGERY | Facility: HOSPITAL | Age: 68
End: 2023-05-04
Payer: MEDICARE

## 2023-05-04 ENCOUNTER — HOSPITAL ENCOUNTER (INPATIENT)
Facility: HOSPITAL | Age: 68
LOS: 2 days | Discharge: HOME HEALTH | End: 2023-05-06
Attending: SURGERY | Admitting: SURGERY
Payer: MEDICARE

## 2023-05-04 DIAGNOSIS — C49.A2 GASTROINTESTINAL STROMAL TUMOR (GIST) OF STOMACH (HCC): Primary | ICD-10-CM

## 2023-05-04 LAB
BASE EXCESS BLD CALC-SCNC: 0 MMOL/L
CA-I BLD-SCNC: 1.16 MMOL/L (ref 1.12–1.32)
CO2 BLD-SCNC: 25 MMOL/L (ref 22–32)
GLUCOSE BLD-MCNC: 164 MG/DL (ref 70–99)
GLUCOSE BLD-MCNC: 83 MG/DL (ref 70–99)
GLUCOSE BLD-MCNC: 92 MG/DL (ref 70–99)
HCO3 BLD-SCNC: 23.7 MEQ/L
HCT VFR BLD CALC: 31 %
PCO2 BLD: 33.7 MMHG
PH BLD: 7.46 [PH]
PO2 BLD: 446 MMHG
POTASSIUM BLD-SCNC: 3.3 MMOL/L (ref 3.6–5.1)
SAO2 % BLD: 100 %
SODIUM BLD-SCNC: 140 MMOL/L (ref 136–145)

## 2023-05-04 PROCEDURE — 76942 ECHO GUIDE FOR BIOPSY: CPT | Performed by: ANESTHESIOLOGY

## 2023-05-04 PROCEDURE — 3E0T3BZ INTRODUCTION OF ANESTHETIC AGENT INTO PERIPHERAL NERVES AND PLEXI, PERCUTANEOUS APPROACH: ICD-10-PCS | Performed by: ANESTHESIOLOGY

## 2023-05-04 PROCEDURE — 0DU647Z SUPPLEMENT STOMACH WITH AUTOLOGOUS TISSUE SUBSTITUTE, PERCUTANEOUS ENDOSCOPIC APPROACH: ICD-10-PCS | Performed by: SURGERY

## 2023-05-04 PROCEDURE — 0DJ08ZZ INSPECTION OF UPPER INTESTINAL TRACT, VIA NATURAL OR ARTIFICIAL OPENING ENDOSCOPIC: ICD-10-PCS | Performed by: SURGERY

## 2023-05-04 PROCEDURE — 99223 1ST HOSP IP/OBS HIGH 75: CPT | Performed by: HOSPITALIST

## 2023-05-04 PROCEDURE — 0DB64ZZ EXCISION OF STOMACH, PERCUTANEOUS ENDOSCOPIC APPROACH: ICD-10-PCS | Performed by: SURGERY

## 2023-05-04 RX ORDER — LEVOTHYROXINE SODIUM 0.03 MG/1
25 TABLET ORAL
Status: DISCONTINUED | OUTPATIENT
Start: 2023-05-04 | End: 2023-05-06

## 2023-05-04 RX ORDER — NALOXONE HYDROCHLORIDE 0.4 MG/ML
80 INJECTION, SOLUTION INTRAMUSCULAR; INTRAVENOUS; SUBCUTANEOUS AS NEEDED
Status: DISCONTINUED | OUTPATIENT
Start: 2023-05-04 | End: 2023-05-04 | Stop reason: HOSPADM

## 2023-05-04 RX ORDER — HYDROMORPHONE HYDROCHLORIDE 1 MG/ML
0.8 INJECTION, SOLUTION INTRAMUSCULAR; INTRAVENOUS; SUBCUTANEOUS EVERY 2 HOUR PRN
Status: DISCONTINUED | OUTPATIENT
Start: 2023-05-04 | End: 2023-05-06

## 2023-05-04 RX ORDER — HYDROMORPHONE HYDROCHLORIDE 1 MG/ML
0.4 INJECTION, SOLUTION INTRAMUSCULAR; INTRAVENOUS; SUBCUTANEOUS EVERY 2 HOUR PRN
Status: DISCONTINUED | OUTPATIENT
Start: 2023-05-04 | End: 2023-05-06

## 2023-05-04 RX ORDER — GLYCOPYRROLATE 0.2 MG/ML
INJECTION, SOLUTION INTRAMUSCULAR; INTRAVENOUS AS NEEDED
Status: DISCONTINUED | OUTPATIENT
Start: 2023-05-04 | End: 2023-05-04 | Stop reason: SURG

## 2023-05-04 RX ORDER — DEXAMETHASONE SODIUM PHOSPHATE 4 MG/ML
VIAL (ML) INJECTION AS NEEDED
Status: DISCONTINUED | OUTPATIENT
Start: 2023-05-04 | End: 2023-05-04 | Stop reason: SURG

## 2023-05-04 RX ORDER — ACETAMINOPHEN 500 MG
1000 TABLET ORAL ONCE
Status: DISCONTINUED | OUTPATIENT
Start: 2023-05-04 | End: 2023-05-04 | Stop reason: HOSPADM

## 2023-05-04 RX ORDER — DIPHENHYDRAMINE HYDROCHLORIDE 50 MG/ML
12.5 INJECTION INTRAMUSCULAR; INTRAVENOUS AS NEEDED
Status: DISCONTINUED | OUTPATIENT
Start: 2023-05-04 | End: 2023-05-04 | Stop reason: HOSPADM

## 2023-05-04 RX ORDER — NICOTINE POLACRILEX 4 MG
30 LOZENGE BUCCAL
Status: DISCONTINUED | OUTPATIENT
Start: 2023-05-04 | End: 2023-05-04 | Stop reason: HOSPADM

## 2023-05-04 RX ORDER — DEXTROSE MONOHYDRATE 25 G/50ML
50 INJECTION, SOLUTION INTRAVENOUS
Status: DISCONTINUED | OUTPATIENT
Start: 2023-05-04 | End: 2023-05-04 | Stop reason: HOSPADM

## 2023-05-04 RX ORDER — SODIUM CHLORIDE 9 MG/ML
INJECTION, SOLUTION INTRAVENOUS CONTINUOUS
Status: DISCONTINUED | OUTPATIENT
Start: 2023-05-04 | End: 2023-05-04

## 2023-05-04 RX ORDER — OXYCODONE HYDROCHLORIDE 5 MG/1
5 TABLET ORAL EVERY 4 HOURS PRN
Status: DISCONTINUED | OUTPATIENT
Start: 2023-05-04 | End: 2023-05-06

## 2023-05-04 RX ORDER — FAMOTIDINE 10 MG/ML
20 INJECTION, SOLUTION INTRAVENOUS 2 TIMES DAILY
Status: DISCONTINUED | OUTPATIENT
Start: 2023-05-04 | End: 2023-05-06

## 2023-05-04 RX ORDER — HYDROMORPHONE HYDROCHLORIDE 1 MG/ML
0.4 INJECTION, SOLUTION INTRAMUSCULAR; INTRAVENOUS; SUBCUTANEOUS EVERY 5 MIN PRN
Status: DISCONTINUED | OUTPATIENT
Start: 2023-05-04 | End: 2023-05-04 | Stop reason: HOSPADM

## 2023-05-04 RX ORDER — SODIUM CHLORIDE, SODIUM LACTATE, POTASSIUM CHLORIDE, CALCIUM CHLORIDE 600; 310; 30; 20 MG/100ML; MG/100ML; MG/100ML; MG/100ML
INJECTION, SOLUTION INTRAVENOUS CONTINUOUS
Status: DISCONTINUED | OUTPATIENT
Start: 2023-05-04 | End: 2023-05-04 | Stop reason: HOSPADM

## 2023-05-04 RX ORDER — ONDANSETRON 2 MG/ML
INJECTION INTRAMUSCULAR; INTRAVENOUS AS NEEDED
Status: DISCONTINUED | OUTPATIENT
Start: 2023-05-04 | End: 2023-05-04 | Stop reason: SURG

## 2023-05-04 RX ORDER — KETOROLAC TROMETHAMINE 15 MG/ML
15 INJECTION, SOLUTION INTRAMUSCULAR; INTRAVENOUS EVERY 6 HOURS PRN
Status: DISCONTINUED | OUTPATIENT
Start: 2023-05-04 | End: 2023-05-04

## 2023-05-04 RX ORDER — ENOXAPARIN SODIUM 100 MG/ML
40 INJECTION SUBCUTANEOUS DAILY
Status: DISCONTINUED | OUTPATIENT
Start: 2023-05-05 | End: 2023-05-06

## 2023-05-04 RX ORDER — MIDAZOLAM HYDROCHLORIDE 1 MG/ML
1 INJECTION INTRAMUSCULAR; INTRAVENOUS EVERY 5 MIN PRN
Status: DISCONTINUED | OUTPATIENT
Start: 2023-05-04 | End: 2023-05-04 | Stop reason: HOSPADM

## 2023-05-04 RX ORDER — PHENYLEPHRINE HCL 10 MG/ML
VIAL (ML) INJECTION AS NEEDED
Status: DISCONTINUED | OUTPATIENT
Start: 2023-05-04 | End: 2023-05-04 | Stop reason: SURG

## 2023-05-04 RX ORDER — FAMOTIDINE 20 MG/1
20 TABLET, FILM COATED ORAL 2 TIMES DAILY
Status: DISCONTINUED | OUTPATIENT
Start: 2023-05-04 | End: 2023-05-06

## 2023-05-04 RX ORDER — CLINDAMYCIN PHOSPHATE 900 MG/50ML
900 INJECTION INTRAVENOUS ONCE
Status: DISCONTINUED | OUTPATIENT
Start: 2023-05-04 | End: 2023-05-04 | Stop reason: HOSPADM

## 2023-05-04 RX ORDER — HEPARIN SODIUM 5000 [USP'U]/ML
5000 INJECTION, SOLUTION INTRAVENOUS; SUBCUTANEOUS ONCE
Status: COMPLETED | OUTPATIENT
Start: 2023-05-04 | End: 2023-05-04

## 2023-05-04 RX ORDER — ONDANSETRON 2 MG/ML
4 INJECTION INTRAMUSCULAR; INTRAVENOUS EVERY 6 HOURS PRN
Status: DISCONTINUED | OUTPATIENT
Start: 2023-05-04 | End: 2023-05-05

## 2023-05-04 RX ORDER — HYDROMORPHONE HYDROCHLORIDE 1 MG/ML
INJECTION, SOLUTION INTRAMUSCULAR; INTRAVENOUS; SUBCUTANEOUS
Status: COMPLETED
Start: 2023-05-04 | End: 2023-05-04

## 2023-05-04 RX ORDER — ONDANSETRON 2 MG/ML
4 INJECTION INTRAMUSCULAR; INTRAVENOUS EVERY 6 HOURS PRN
Status: DISCONTINUED | OUTPATIENT
Start: 2023-05-04 | End: 2023-05-04 | Stop reason: HOSPADM

## 2023-05-04 RX ORDER — SODIUM CHLORIDE 9 MG/ML
INJECTION, SOLUTION INTRAVENOUS CONTINUOUS PRN
Status: DISCONTINUED | OUTPATIENT
Start: 2023-05-04 | End: 2023-05-04 | Stop reason: SURG

## 2023-05-04 RX ORDER — NICOTINE POLACRILEX 4 MG
15 LOZENGE BUCCAL
Status: DISCONTINUED | OUTPATIENT
Start: 2023-05-04 | End: 2023-05-04 | Stop reason: HOSPADM

## 2023-05-04 RX ORDER — ROCURONIUM BROMIDE 10 MG/ML
INJECTION, SOLUTION INTRAVENOUS AS NEEDED
Status: DISCONTINUED | OUTPATIENT
Start: 2023-05-04 | End: 2023-05-04 | Stop reason: SURG

## 2023-05-04 RX ORDER — HYDROMORPHONE HYDROCHLORIDE 1 MG/ML
0.6 INJECTION, SOLUTION INTRAMUSCULAR; INTRAVENOUS; SUBCUTANEOUS EVERY 5 MIN PRN
Status: DISCONTINUED | OUTPATIENT
Start: 2023-05-04 | End: 2023-05-04 | Stop reason: HOSPADM

## 2023-05-04 RX ORDER — HYDROMORPHONE HYDROCHLORIDE 1 MG/ML
0.2 INJECTION, SOLUTION INTRAMUSCULAR; INTRAVENOUS; SUBCUTANEOUS EVERY 2 HOUR PRN
Status: DISCONTINUED | OUTPATIENT
Start: 2023-05-04 | End: 2023-05-06

## 2023-05-04 RX ORDER — CEFOXITIN 2 G/1
INJECTION, POWDER, FOR SOLUTION INTRAVENOUS AS NEEDED
Status: DISCONTINUED | OUTPATIENT
Start: 2023-05-04 | End: 2023-05-04 | Stop reason: SURG

## 2023-05-04 RX ORDER — KETOROLAC TROMETHAMINE 15 MG/ML
15 INJECTION, SOLUTION INTRAMUSCULAR; INTRAVENOUS EVERY 6 HOURS PRN
Status: DISCONTINUED | OUTPATIENT
Start: 2023-05-04 | End: 2023-05-05

## 2023-05-04 RX ORDER — KETAMINE HYDROCHLORIDE 50 MG/ML
INJECTION, SOLUTION, CONCENTRATE INTRAMUSCULAR; INTRAVENOUS AS NEEDED
Status: DISCONTINUED | OUTPATIENT
Start: 2023-05-04 | End: 2023-05-04 | Stop reason: SURG

## 2023-05-04 RX ORDER — ACETAMINOPHEN 500 MG
1000 TABLET ORAL EVERY 6 HOURS
Status: DISCONTINUED | OUTPATIENT
Start: 2023-05-04 | End: 2023-05-04

## 2023-05-04 RX ORDER — MIDAZOLAM HYDROCHLORIDE 1 MG/ML
INJECTION INTRAMUSCULAR; INTRAVENOUS AS NEEDED
Status: DISCONTINUED | OUTPATIENT
Start: 2023-05-04 | End: 2023-05-04 | Stop reason: SURG

## 2023-05-04 RX ORDER — HYDROMORPHONE HYDROCHLORIDE 1 MG/ML
0.2 INJECTION, SOLUTION INTRAMUSCULAR; INTRAVENOUS; SUBCUTANEOUS EVERY 5 MIN PRN
Status: DISCONTINUED | OUTPATIENT
Start: 2023-05-04 | End: 2023-05-04 | Stop reason: HOSPADM

## 2023-05-04 RX ORDER — MEPERIDINE HYDROCHLORIDE 25 MG/ML
12.5 INJECTION INTRAMUSCULAR; INTRAVENOUS; SUBCUTANEOUS AS NEEDED
Status: DISCONTINUED | OUTPATIENT
Start: 2023-05-04 | End: 2023-05-04 | Stop reason: HOSPADM

## 2023-05-04 RX ORDER — METOCLOPRAMIDE HYDROCHLORIDE 5 MG/ML
10 INJECTION INTRAMUSCULAR; INTRAVENOUS EVERY 8 HOURS PRN
Status: DISCONTINUED | OUTPATIENT
Start: 2023-05-04 | End: 2023-05-04 | Stop reason: HOSPADM

## 2023-05-04 RX ORDER — SODIUM CHLORIDE, SODIUM LACTATE, POTASSIUM CHLORIDE, CALCIUM CHLORIDE 600; 310; 30; 20 MG/100ML; MG/100ML; MG/100ML; MG/100ML
INJECTION, SOLUTION INTRAVENOUS CONTINUOUS
Status: DISCONTINUED | OUTPATIENT
Start: 2023-05-04 | End: 2023-05-05

## 2023-05-04 RX ORDER — ACETAMINOPHEN 10 MG/ML
1000 INJECTION, SOLUTION INTRAVENOUS EVERY 6 HOURS
Status: DISCONTINUED | OUTPATIENT
Start: 2023-05-05 | End: 2023-05-05

## 2023-05-04 RX ADMIN — MIDAZOLAM HYDROCHLORIDE 2 MG: 1 INJECTION INTRAMUSCULAR; INTRAVENOUS at 07:43:00

## 2023-05-04 RX ADMIN — SODIUM CHLORIDE: 9 INJECTION, SOLUTION INTRAVENOUS at 10:39:00

## 2023-05-04 RX ADMIN — PHENYLEPHRINE HCL 200 MCG: 10 MG/ML VIAL (ML) INJECTION at 08:25:00

## 2023-05-04 RX ADMIN — PHENYLEPHRINE HCL 200 MCG: 10 MG/ML VIAL (ML) INJECTION at 08:12:00

## 2023-05-04 RX ADMIN — SODIUM CHLORIDE: 9 INJECTION, SOLUTION INTRAVENOUS at 07:40:00

## 2023-05-04 RX ADMIN — GLYCOPYRROLATE 0.3 MG: 0.2 INJECTION, SOLUTION INTRAMUSCULAR; INTRAVENOUS at 08:29:00

## 2023-05-04 RX ADMIN — CEFOXITIN 2 G: 2 INJECTION, POWDER, FOR SOLUTION INTRAVENOUS at 08:25:00

## 2023-05-04 RX ADMIN — DEXAMETHASONE SODIUM PHOSPHATE 5 MG: 4 MG/ML VIAL (ML) INJECTION at 09:15:00

## 2023-05-04 RX ADMIN — ROCURONIUM BROMIDE 30 MG: 10 INJECTION, SOLUTION INTRAVENOUS at 09:00:00

## 2023-05-04 RX ADMIN — ROCURONIUM BROMIDE 50 MG: 10 INJECTION, SOLUTION INTRAVENOUS at 07:46:00

## 2023-05-04 RX ADMIN — CEFOXITIN 2 G: 2 INJECTION, POWDER, FOR SOLUTION INTRAVENOUS at 10:21:00

## 2023-05-04 RX ADMIN — SODIUM CHLORIDE: 9 INJECTION, SOLUTION INTRAVENOUS at 09:44:00

## 2023-05-04 RX ADMIN — ONDANSETRON 4 MG: 2 INJECTION INTRAMUSCULAR; INTRAVENOUS at 10:00:00

## 2023-05-04 RX ADMIN — KETAMINE HYDROCHLORIDE 50 MG: 50 INJECTION, SOLUTION, CONCENTRATE INTRAMUSCULAR; INTRAVENOUS at 08:58:00

## 2023-05-04 NOTE — ANESTHESIA POSTPROCEDURE EVALUATION
[facility] Patient Status:  Inpatient   Age/Gender 76year old female MRN [id number]   [facility] SURGERY Attending Laura Alberto MD   Hosp Day # 0 PCP Jerel Marroquin MD       Anesthesia Post-op Note    Laparoscopic, robotic assisted resection of GIST tumor with wedge resection of stomach; omental pedicle flap. Procedure Summary     Date: 05/04/23 Room / Location: [facility] MAIN OR 09 / [facility] MAIN OR    Anesthesia Start: 0740 Anesthesia Stop:     Procedures:       Laparoscopic, robotic assisted resection of GIST tumor with wedge resection of stomach; omental pedicle flap. 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 A2])    Surgeons: Laura Alberto MD; Ally Mar MD Anesthesiologist: Ayleen Don MD    Anesthesia Type: general ASA Status: 3          Anesthesia Type: general    Vitals Value Taken Time   /77 05/04/23 1038   Temp 97.4 05/04/23 1039   Pulse 82 05/04/23 1039   Resp 15 05/04/23 1039   SpO2 100 % 05/04/23 1039   Vitals shown include unvalidated device data. Patient Location: PACU    Anesthesia Type: general    Airway Patency: patent and extubated    Mental Status: preanesthetic baseline    Nausea/Vomiting: none    Cardiopulmonary/Hydration status: stable euvolemic    Complications: no apparent anesthesia related complications    Postop vital signs: stable    Patient to be discharged from PACU when criteria met.

## 2023-05-04 NOTE — ANESTHESIA PROCEDURE NOTES
Arterial Line    Performed by: Ruy Lindquist MD  Authorized by: Ruy Lindquist MD    General Information and Staff    Procedure Start:   Procedure End:  5/4/2023 7:58 AM  Anesthesiologist:  Ruy Lindquist MD  Performed By:  Anesthesiologist  Patient Location:  OR  Indication: continuous blood pressure monitoring and blood sampling needed    Site Identification: real time ultrasound guided and image stored and retrievable    Preanesthetic Checklist: 2 patient identifiers, IV checked, risks and benefits discussed, monitors and equipment checked, pre-op evaluation, timeout performed, anesthesia consent and sterile technique used    Procedure Details    Catheter Size:  20 G  Catheter Length:  1 and 3/4 inch  Catheter Type:  Arrow  Seldinger Technique?: Yes    Laterality:  Left  Site:  Radial artery  Site Prep: chlorhexidine    Line Secured:  Wrist Brace, tape and Tegaderm    Assessment    Events: patient tolerated procedure well with no complications      Medications      Additional Comments

## 2023-05-04 NOTE — INTERVAL H&P NOTE
There has been no significant change since the patient was seen as documented in EPIC. Surgery revisted. To proceed as planned.       Kavitha Collins PA-C

## 2023-05-04 NOTE — ANESTHESIA PROCEDURE NOTES
Airway  Date/Time: 5/4/2023 7:47 AM  Urgency: elective      General Information and Staff    Patient location during procedure: OR  Anesthesiologist: Ayleen Don MD  Performed: anesthesiologist   Performed by: Ayleen Don MD  Authorized by: Ayleen Don MD      Indications and Patient Condition  Indications for airway management: anesthesia  Spontaneous ventilation: present  Sedation level: moderate (conscious sedation)  Preoxygenated: yes  Patient position: sniffing  Mask difficulty assessment: 1 - vent by mask    Final Airway Details  Final airway type: endotracheal airway      Successful airway: ETT  Cuffed: yes   Successful intubation technique: direct laryngoscopy  Facilitating devices/methods: intubating stylet  Endotracheal tube insertion site: oral  Blade: Argelia  Blade size: #3  ETT size (mm): 7.5    Cormack-Lehane Classification: grade IIB - view of arytenoids or posterior of glottis only  Placement verified by: chest auscultation and capnometry   Measured from: lips  ETT to lips (cm): 21  Number of attempts at approach: 1  Number of other approaches attempted: 0

## 2023-05-04 NOTE — PLAN OF CARE
Pt received from PACU. Pt drowsy. Family at the bedside, updated on POC. Pain controlled with PRN Dilaudid. Ketamine discontinued. 7 lap sites with gauze and tegaderm have old drainage. Pt complaining of nausea, prn zofran with relief. Marie in place with adequate urine output. Will continue to monitor.

## 2023-05-04 NOTE — ANESTHESIA PROCEDURE NOTES
Peripheral IV  Date/Time: 5/4/2023 8:00 AM  Inserted by: Juan Rey MD    Placement  Needle size: 16 G  Laterality: right  Location: wrist  Local anesthetic: none  Site prep: chlorhexidine and alcohol  Technique: anatomical landmarks  Attempts: 1

## 2023-05-04 NOTE — BRIEF OP NOTE
Pre-Operative Diagnosis: Gastrointestinal stromal tumor (GIST) of stomach ([address] [C49. A2]     Post-Operative Diagnosis: Gastrointestinal stromal tumor (GIST) of stomach ([address] [C49. A2]      Procedure Performed:   Laparoscopic, robotic assisted resection of GIST tumor with wedge resection of stomach; omental pedicle flap.     Surgeon(s) and Role:     * Sofía Bates MD - Primary    Assistant(s):  PA: MARTHA Balderas     Surgical Findings: as expected     Specimen: yes     Estimated Blood Loss: Blood Output: 10 mL (5/4/2023 10:10 AM)      Darylene Alto, PA  5/4/2023  10:22 AM

## 2023-05-04 NOTE — ANESTHESIA PROCEDURE NOTES
Regional Block    Date/Time: 5/4/2023 7:50 AM    Performed by: Prerna Dias MD  Authorized by: Prerna Dias MD      General Information and Staff    Start Time:  5/4/2023 7:50 AM  End Time:  5/4/2023 8:00 AM  Anesthesiologist:  Prerna Dias MD  Performed by: Anesthesiologist  Patient Location:  OR    Block Placement: Post Induction  Site Identification: real time ultrasound guided and image stored and retrievable    Block site/laterality marked before start: site marked  Reason for Block: at surgeon's request and post-op pain management    Preanesthetic Checklist: 2 patient identifers, IV checked, risks and benefits discussed, monitors and equipment checked, pre-op evaluation, timeout performed, anesthesia consent, sterile technique used, no prohibitive neurological deficits and no local skin infection at insertion site      Procedure Details    Patient Position:  Supine  Prep: ChloraPrep    Monitoring:  Cardiac monitor, continuous pulse ox and blood pressure cuff  Block Type:  TAP  Laterality:  Bilateral  Injection Technique:  Single-shot    Needle    Needle Type:  Short-bevel and echogenic  Needle Gauge:  21 G  Needle Length:  110 mm  Needle Localization:  Ultrasound guidance  Reason for Ultrasound Use: appropriate spread of the medication was noted in real time and no ultrasound evidence of intravascular and/or intraneural injection            Assessment    Injection Assessment:  Good spread noted, negative resistance, negative aspiration for heme, incremental injection and low pressure  Heart Rate Change: No    - Patient tolerated block procedure well without evidence of immediate block related complications.      Medications  5/4/2023 7:50 AM      Additional Comments    Medication:  Ropivacaine 0.25% 30mL + dexamethasone 2mg - 30 ml  each side

## 2023-05-05 LAB
ALBUMIN SERPL-MCNC: 3.1 G/DL (ref 3.4–5)
ALBUMIN/GLOB SERPL: 1.3 {RATIO} (ref 1–2)
ALP LIVER SERPL-CCNC: 50 U/L
ALT SERPL-CCNC: 31 U/L
ANION GAP SERPL CALC-SCNC: 5 MMOL/L (ref 0–18)
AST SERPL-CCNC: 22 U/L (ref 15–37)
BILIRUB SERPL-MCNC: 0.5 MG/DL (ref 0.1–2)
BUN BLD-MCNC: 15 MG/DL (ref 7–18)
CALCIUM BLD-MCNC: 8.5 MG/DL (ref 8.5–10.1)
CHLORIDE SERPL-SCNC: 109 MMOL/L (ref 98–112)
CO2 SERPL-SCNC: 26 MMOL/L (ref 21–32)
CREAT BLD-MCNC: 0.51 MG/DL
ERYTHROCYTE [DISTWIDTH] IN BLOOD BY AUTOMATED COUNT: 13.2 %
GFR SERPLBLD BASED ON 1.73 SQ M-ARVRAT: 102 ML/MIN/1.73M2 (ref 60–?)
GLOBULIN PLAS-MCNC: 2.4 G/DL (ref 2.8–4.4)
GLUCOSE BLD-MCNC: 123 MG/DL (ref 70–99)
HCT VFR BLD AUTO: 32.7 %
HGB BLD-MCNC: 11 G/DL
MAGNESIUM SERPL-MCNC: 2.1 MG/DL (ref 1.6–2.6)
MCH RBC QN AUTO: 29.7 PG (ref 26–34)
MCHC RBC AUTO-ENTMCNC: 33.6 G/DL (ref 31–37)
MCV RBC AUTO: 88.4 FL
OSMOLALITY SERPL CALC.SUM OF ELEC: 292 MOSM/KG (ref 275–295)
PHOSPHATE SERPL-MCNC: 3 MG/DL (ref 2.5–4.9)
PLATELET # BLD AUTO: 138 10(3)UL (ref 150–450)
POTASSIUM SERPL-SCNC: 3.7 MMOL/L (ref 3.5–5.1)
PROT SERPL-MCNC: 5.5 G/DL (ref 6.4–8.2)
RBC # BLD AUTO: 3.7 X10(6)UL
SODIUM SERPL-SCNC: 140 MMOL/L (ref 136–145)
WBC # BLD AUTO: 11.1 X10(3) UL (ref 4–11)

## 2023-05-05 PROCEDURE — 99232 SBSQ HOSP IP/OBS MODERATE 35: CPT | Performed by: INTERNAL MEDICINE

## 2023-05-05 RX ORDER — ONDANSETRON 4 MG/1
4 TABLET, ORALLY DISINTEGRATING ORAL EVERY 6 HOURS PRN
Status: DISCONTINUED | OUTPATIENT
Start: 2023-05-05 | End: 2023-05-06

## 2023-05-05 RX ORDER — IBUPROFEN 400 MG/1
400 TABLET ORAL EVERY 6 HOURS PRN
Status: DISCONTINUED | OUTPATIENT
Start: 2023-05-05 | End: 2023-05-06

## 2023-05-05 RX ORDER — ACETAMINOPHEN 500 MG
1000 TABLET ORAL EVERY 6 HOURS
Status: DISCONTINUED | OUTPATIENT
Start: 2023-05-05 | End: 2023-05-06

## 2023-05-05 NOTE — OPERATIVE REPORT
[facility]    PATIENT'S NAME: [place] BELCH[facility] GI   ATTENDING PHYSICIAN: Clarence Dougherty MD   OPERATING PHYSICIAN: Wilbur Lee. Daniele Dougherty MD   PATIENT ACCOUNT#:   [id number]    LOCATION:  [address] [place] [address]  MEDICAL RECORD #:   [id number]       YOB: 1955  ADMISSION DATE:       05/04/2023      OPERATION DATE:  05/04/2023    OPERATIVE REPORT      PREOPERATIVE DIAGNOSIS:  Gastric gastrointestinal stromal tumor. POSTOPERATIVE DIAGNOSIS:  Gastric gastrointestinal stromal tumor. PROCEDURE:    1. Laparoscopic robotic-assisted resection of gastrointestinal stromal tumor with en bloc wedge resection of the stomach. 2. Omental pedicle flap. ASSISTANT:  MARTHA Sharma     ANESTHESIA:  General.    INDICATIONS:  The patient is a 58-year-old woman who was diagnosed with gastrointestinal with GIST. She presents today for surgical management, which had been discussed with her including risks. FINDINGS:  About 6 cm lesser curvature GIST. No other pathology noted. OPERATIVE TECHNIQUE:  Patient was brought to the operating room and was placed in supine position. She was given general anesthesia by the anesthesiology service. Sequential compression boots were placed. The patient received preoperative intravenous antibiotic prophylaxis. She was prepped and draped in normal sterile fashion. The abdominal cavity was accessed via Veress needle from a left upper quadrant site. Using Opti-Port and a 5 mm camera, trocar was introduced under visualization. Pneumoinsufflation was maintained, and under visualization, robotic trocars were placed with an assist port via AirSeal in the left the lower abdomen and a snake retractor from the right upper quadrant to elevate the left lateral lobe of the liver. Evaluation reveals about 6 cm tumor arising from the lesser curvature of the stomach.   At this point, Dr. Estefania Ta from GI service presented, and he performed an upper endoscopy over which I did perform ultimately a wedge resection. The tumor was circumferentially mobilized from surrounding tissue. It was elevated. Attachments to the pancreas were loose and . Once circumferential mobilization was undertaken, the tumor was noted to be mostly exophytic to the lesser curvature with a small area of involvement of the lesser curvature. With the scope in place, I performed a wedge resection via a stapler green load enforced with Seamguard , and the specimen was detached to include the wedge of the stomach and the tumor itself. The staple line was inspected for leak via insufflation using the gastroscope, and none was noted. The gastroscope was then retracted. Hemostasis was achieved and maintained. At this point, the lesser omentum was mobilized as a flap to cover the staple line and sutured in place using 3-0 Vicryl sutures. Trocars were removed under visualization. The 12 mm trocar site fascia was reapproximated with 0 Vicryl suture. The skin was then reapproximated using 4-0 Vicryl suture. Steri-Strips with Telfa and Tegaderm dressings were then applied. The patient tolerated the procedure well without immediate complications. She was extubated in the operating room and was sent in stable condition to recovery room. Counts were correct. I was present throughout the procedure. Dictated By Osmel Javed MD  d: 05/04/2023 17:01:56  t: 05/04/2023 19:45:24  Job [id number]  AOP/

## 2023-05-05 NOTE — CM/SW NOTE
Pt discussed with RN. POD #1 s/p laparoscopic robotic-assisted resection of gastrointestinal stromal tumor with en bloc wedge resection of the stomach. SW met with pt and children at bedside. Made copies of pt insurance cards and Part D plan and sent to OP Reg. Pt and family aware of referral for [facility] at [facility]. [facility] had accepted pt.        05/05/23 1200   CM/SW Referral Data   Referral Source Social Work (self-referral); Physician   Reason for Referral Discharge planning   Informant Patient;Clinical Staff Member   Patient Status Prior to Admission   Independent with ADLs and Mobility Yes   Discharge Needs   Anticipated D/C needs Home health care       [place], [place]  Discharge 2011 [facility]

## 2023-05-05 NOTE — HOME CARE LIAISON
Received referral via [facility] for Home Health services. Spoke w/ patient and son provided with list of [facility] providers from [facility], choice is Rachel Mendoza. Agency reserved in [facility] and contact information placed on AVS.Financial interest disclosure provided. Notified SABAS.

## 2023-05-05 NOTE — PLAN OF CARE
Assumed care at 1900. A & O x 4; lethargic  Patient reports severe abdominal pain and headache overnight. Pain managed with PRN dilaudid and scheduled IV Tylenol. 7 lap sites with gauze and Tegaderm. Old drainage noted. Zofran x 1 given to relieve nausea. Marie in place. Adequate urine output overnight. Family member at bedside. Call light within reach. Patient and family updated on plan of care.

## 2023-05-05 NOTE — PLAN OF CARE
Pt A/O x4. RA. NSR/SB on tele. VSS. Pain controlled with PRN oxy and motrin. 7 lap sites with gauze and tegaderm have old drainage. Marie removed at 1230. Adequate urine output. Tolerating full liquids but does complain of nausea at times, relieved by zofran. PT/OT worked with pt, recommending home. Pt in the chair for majority of the day. Walked in the [place] with nursing staff as well. Family at the bedside. Updated on POC. Will continue to monitor.

## 2023-05-06 VITALS
SYSTOLIC BLOOD PRESSURE: 135 MMHG | WEIGHT: 117 LBS | TEMPERATURE: 98 F | OXYGEN SATURATION: 100 % | HEART RATE: 73 BPM | HEIGHT: 64 IN | RESPIRATION RATE: 20 BRPM | DIASTOLIC BLOOD PRESSURE: 79 MMHG | BODY MASS INDEX: 19.97 KG/M2

## 2023-05-06 LAB
ALBUMIN SERPL-MCNC: 3.1 G/DL (ref 3.4–5)
ALBUMIN/GLOB SERPL: 1.1 {RATIO} (ref 1–2)
ALP LIVER SERPL-CCNC: 53 U/L
ALT SERPL-CCNC: 28 U/L
ANION GAP SERPL CALC-SCNC: 2 MMOL/L (ref 0–18)
AST SERPL-CCNC: 17 U/L (ref 15–37)
BILIRUB SERPL-MCNC: 0.7 MG/DL (ref 0.1–2)
BLOOD TYPE BARCODE: 5100
BUN BLD-MCNC: 12 MG/DL (ref 7–18)
CALCIUM BLD-MCNC: 8.7 MG/DL (ref 8.5–10.1)
CHLORIDE SERPL-SCNC: 110 MMOL/L (ref 98–112)
CO2 SERPL-SCNC: 30 MMOL/L (ref 21–32)
CREAT BLD-MCNC: 0.6 MG/DL
ERYTHROCYTE [DISTWIDTH] IN BLOOD BY AUTOMATED COUNT: 13.1 %
GFR SERPLBLD BASED ON 1.73 SQ M-ARVRAT: 98 ML/MIN/1.73M2 (ref 60–?)
GLOBULIN PLAS-MCNC: 2.9 G/DL (ref 2.8–4.4)
GLUCOSE BLD-MCNC: 98 MG/DL (ref 70–99)
HCT VFR BLD AUTO: 34.8 %
HGB BLD-MCNC: 11.7 G/DL
MCH RBC QN AUTO: 29.7 PG (ref 26–34)
MCHC RBC AUTO-ENTMCNC: 33.6 G/DL (ref 31–37)
MCV RBC AUTO: 88.3 FL
OSMOLALITY SERPL CALC.SUM OF ELEC: 294 MOSM/KG (ref 275–295)
PLATELET # BLD AUTO: 140 10(3)UL (ref 150–450)
POTASSIUM SERPL-SCNC: 3.5 MMOL/L (ref 3.5–5.1)
PROT SERPL-MCNC: 6 G/DL (ref 6.4–8.2)
RBC # BLD AUTO: 3.94 X10(6)UL
SODIUM SERPL-SCNC: 142 MMOL/L (ref 136–145)
UNIT VOLUME: 350 ML
WBC # BLD AUTO: 7.1 X10(3) UL (ref 4–11)

## 2023-05-06 PROCEDURE — 99239 HOSP IP/OBS DSCHRG MGMT >30: CPT | Performed by: INTERNAL MEDICINE

## 2023-05-06 RX ORDER — ENOXAPARIN SODIUM 100 MG/ML
40 INJECTION SUBCUTANEOUS DAILY
Qty: 5.6 ML | Refills: 0 | Status: SHIPPED | OUTPATIENT
Start: 2023-05-06 | End: 2023-05-20

## 2023-05-06 RX ORDER — ECHINACEA PURPUREA EXTRACT 125 MG
1 TABLET ORAL
Status: DISCONTINUED | OUTPATIENT
Start: 2023-05-06 | End: 2023-05-06

## 2023-05-06 RX ORDER — GUAIFENESIN 600 MG/1
600 TABLET, EXTENDED RELEASE ORAL 2 TIMES DAILY
Status: DISCONTINUED | OUTPATIENT
Start: 2023-05-06 | End: 2023-05-06

## 2023-05-06 RX ORDER — POTASSIUM CHLORIDE 20 MEQ/1
40 TABLET, EXTENDED RELEASE ORAL EVERY 4 HOURS
Status: COMPLETED | OUTPATIENT
Start: 2023-05-06 | End: 2023-05-06

## 2023-05-06 RX ORDER — OXYCODONE HYDROCHLORIDE 5 MG/1
5 TABLET ORAL EVERY 4 HOURS PRN
Qty: 20 TABLET | Refills: 0 | Status: SHIPPED | OUTPATIENT
Start: 2023-05-06

## 2023-05-06 NOTE — PLAN OF CARE
Discharge instructions, prescriptions, medications & follow-up appointments reviewed with pt & son. Verbalizes understanding. Son to give Lovenox injections at home. He watched video & gave injection. Lap sites X7, dressings removed by APN. Instructed how to shower. IV SL X2 DC'd. Son to drive pt home. To car via [address] [address] accompanied by [address] [place] [address] Rd staff.

## 2023-05-06 NOTE — PLAN OF CARE
Assumed care at 1900. A & O x 4  Patient reports severe abdominal pain and headache overnight. Pain manage scheduled Tylenol and PRN Motrin. 7 lap sites with gauze and Tegaderm. Old drainage noted. Adequate urine output overnight. Family member at bedside. Call light within reach. Patient and family updated on plan of care.

## 2023-05-08 ENCOUNTER — TELEPHONE (OUTPATIENT)
Dept: SURGERY | Facility: CLINIC | Age: 68
End: 2023-05-08

## 2023-05-08 ENCOUNTER — PATIENT OUTREACH (OUTPATIENT)
Dept: CASE MANAGEMENT | Age: 68
End: 2023-05-08

## 2023-05-08 NOTE — TELEPHONE ENCOUNTER
Called to check on patient after hospital discharge. Spoke with patient's son Roselyn Mckeon. Pt's son stated she is having minimal pain, but doing very well. Pt has not had a bowel movement, but is passing flatus. Pt is tolerating diet. Pt does not have much of an appetite, but has been improving daily. Pt has no fevers or vomiting. Pt has had some nausea, but zofran is helping. Confirmed post op appointment for Thursday.

## 2023-05-11 ENCOUNTER — OFFICE VISIT (OUTPATIENT)
Dept: SURGERY | Facility: CLINIC | Age: 68
End: 2023-05-11
Payer: MEDICARE

## 2023-05-11 ENCOUNTER — OFFICE VISIT (OUTPATIENT)
Dept: HEMATOLOGY/ONCOLOGY | Facility: HOSPITAL | Age: 68
End: 2023-05-11
Attending: SURGERY
Payer: MEDICARE

## 2023-05-11 VITALS
OXYGEN SATURATION: 97 % | BODY MASS INDEX: 19 KG/M2 | DIASTOLIC BLOOD PRESSURE: 80 MMHG | RESPIRATION RATE: 16 BRPM | SYSTOLIC BLOOD PRESSURE: 132 MMHG | TEMPERATURE: 97 F | WEIGHT: 116 LBS | HEART RATE: 66 BPM

## 2023-05-11 DIAGNOSIS — C49.A2 GASTROINTESTINAL STROMAL TUMOR (GIST) OF STOMACH (HCC): Primary | ICD-10-CM

## 2023-05-11 LAB
ALBUMIN SERPL-MCNC: 3.4 G/DL (ref 3.4–5)
ALBUMIN/GLOB SERPL: 1.1 {RATIO} (ref 1–2)
ALP LIVER SERPL-CCNC: 60 U/L
ALT SERPL-CCNC: 92 U/L
ANION GAP SERPL CALC-SCNC: 3 MMOL/L (ref 0–18)
AST SERPL-CCNC: 51 U/L (ref 15–37)
BILIRUB SERPL-MCNC: 0.7 MG/DL (ref 0.1–2)
BUN BLD-MCNC: 13 MG/DL (ref 7–18)
CALCIUM BLD-MCNC: 8.7 MG/DL (ref 8.5–10.1)
CHLORIDE SERPL-SCNC: 101 MMOL/L (ref 98–112)
CO2 SERPL-SCNC: 26 MMOL/L (ref 21–32)
CREAT BLD-MCNC: 0.46 MG/DL
ERYTHROCYTE [DISTWIDTH] IN BLOOD BY AUTOMATED COUNT: 13.1 %
FASTING STATUS PATIENT QL REPORTED: NO
GFR SERPLBLD BASED ON 1.73 SQ M-ARVRAT: 104 ML/MIN/1.73M2 (ref 60–?)
GLOBULIN PLAS-MCNC: 3.1 G/DL (ref 2.8–4.4)
GLUCOSE BLD-MCNC: 118 MG/DL (ref 70–99)
HCT VFR BLD AUTO: 35.9 %
HGB BLD-MCNC: 12.1 G/DL
MCH RBC QN AUTO: 29.8 PG (ref 26–34)
MCHC RBC AUTO-ENTMCNC: 33.7 G/DL (ref 31–37)
MCV RBC AUTO: 88.4 FL
OSMOLALITY SERPL CALC.SUM OF ELEC: 271 MOSM/KG (ref 275–295)
PLATELET # BLD AUTO: 194 10(3)UL (ref 150–450)
POTASSIUM SERPL-SCNC: 3.5 MMOL/L (ref 3.5–5.1)
PROT SERPL-MCNC: 6.5 G/DL (ref 6.4–8.2)
RBC # BLD AUTO: 4.06 X10(6)UL
SODIUM SERPL-SCNC: 130 MMOL/L (ref 136–145)
WBC # BLD AUTO: 5.7 X10(3) UL (ref 4–11)

## 2023-05-11 PROCEDURE — 99024 POSTOP FOLLOW-UP VISIT: CPT | Performed by: SURGERY

## 2023-05-11 PROCEDURE — 1111F DSCHRG MED/CURRENT MED MERGE: CPT | Performed by: SURGERY

## 2023-05-11 PROCEDURE — 80053 COMPREHEN METABOLIC PANEL: CPT | Performed by: PHYSICIAN ASSISTANT

## 2023-05-11 PROCEDURE — 1125F AMNT PAIN NOTED PAIN PRSNT: CPT | Performed by: SURGERY

## 2023-05-11 PROCEDURE — 85027 COMPLETE CBC AUTOMATED: CPT | Performed by: PHYSICIAN ASSISTANT

## 2023-05-11 NOTE — PROGRESS NOTES
Education Record    Learner:  Patient    Disease / Diagnosis: IVF    Barriers / Limitations:  None   Comments:    Method:  Brief focused and Discussion   Comments:    General Topics:  Plan of care reviewed   Comments:    Outcome:  Shows understanding   Comments:

## 2023-05-12 ENCOUNTER — LAB ENCOUNTER (OUTPATIENT)
Dept: LAB | Facility: HOSPITAL | Age: 68
End: 2023-05-12
Attending: PHYSICIAN ASSISTANT
Payer: MEDICARE

## 2023-05-12 ENCOUNTER — TELEPHONE (OUTPATIENT)
Dept: HEMATOLOGY/ONCOLOGY | Facility: HOSPITAL | Age: 68
End: 2023-05-12

## 2023-05-12 ENCOUNTER — TELEPHONE (OUTPATIENT)
Dept: SURGERY | Facility: CLINIC | Age: 68
End: 2023-05-12

## 2023-05-12 DIAGNOSIS — C49.A2 GASTROINTESTINAL STROMAL TUMOR (GIST) OF STOMACH (HCC): ICD-10-CM

## 2023-05-12 LAB
ANION GAP SERPL CALC-SCNC: 2 MMOL/L (ref 0–18)
BUN BLD-MCNC: 12 MG/DL (ref 7–18)
CALCIUM BLD-MCNC: 8.7 MG/DL (ref 8.5–10.1)
CHLORIDE SERPL-SCNC: 105 MMOL/L (ref 98–112)
CO2 SERPL-SCNC: 27 MMOL/L (ref 21–32)
CREAT BLD-MCNC: 0.6 MG/DL
FASTING STATUS PATIENT QL REPORTED: NO
GFR SERPLBLD BASED ON 1.73 SQ M-ARVRAT: 98 ML/MIN/1.73M2 (ref 60–?)
GLUCOSE BLD-MCNC: 106 MG/DL (ref 70–99)
OSMOLALITY SERPL CALC.SUM OF ELEC: 278 MOSM/KG (ref 275–295)
POTASSIUM SERPL-SCNC: 3.6 MMOL/L (ref 3.5–5.1)
SODIUM SERPL-SCNC: 134 MMOL/L (ref 136–145)

## 2023-05-12 PROCEDURE — 36415 COLL VENOUS BLD VENIPUNCTURE: CPT

## 2023-05-12 PROCEDURE — 80048 BASIC METABOLIC PNL TOTAL CA: CPT

## 2023-05-12 NOTE — TELEPHONE ENCOUNTER
Called patient with lab results. Per Hermes THOMAS patient sodium alittle low, patient to have Gatorade. Patient notified. Patient verbalized understanding.

## 2023-05-15 RX ORDER — LEVOTHYROXINE SODIUM 0.03 MG/1
TABLET ORAL
Qty: 90 TABLET | Refills: 0 | Status: SHIPPED | OUTPATIENT
Start: 2023-05-15

## 2023-05-16 ENCOUNTER — OFFICE VISIT (OUTPATIENT)
Dept: HEMATOLOGY/ONCOLOGY | Facility: HOSPITAL | Age: 68
End: 2023-05-16
Attending: SURGERY
Payer: MEDICARE

## 2023-05-16 VITALS
TEMPERATURE: 98 F | OXYGEN SATURATION: 99 % | WEIGHT: 115.5 LBS | SYSTOLIC BLOOD PRESSURE: 132 MMHG | DIASTOLIC BLOOD PRESSURE: 76 MMHG | BODY MASS INDEX: 19 KG/M2 | HEART RATE: 82 BPM | RESPIRATION RATE: 20 BRPM

## 2023-05-16 DIAGNOSIS — C49.A2 GASTROINTESTINAL STROMAL TUMOR (GIST) OF STOMACH (HCC): Primary | ICD-10-CM

## 2023-05-16 PROCEDURE — 99215 OFFICE O/P EST HI 40 MIN: CPT | Performed by: INTERNAL MEDICINE

## 2023-05-16 NOTE — PROGRESS NOTES
Outpatient Oncology Care Plan   Problem list:   fatigue   Problems related to:   self care   Interventions:   provided general teaching   Expected outcomes:   understands plan of care   Progress towards outcome: making progress     Education Record   Learner: Patient   Barriers / Limitations: None   Method: Discussion   Outcome: Shows understanding   Comments:  Patient here for follow-up. Had surgery 5/4/23. Still having fatigue and low appetite. States pain is managed with OTC anti-inflammatories. States she is experiencing dry mouth that is waking her at night. Having loose bowels, denies more than 4 episodes per day. Here to discuss next steps in plan of care.

## 2023-05-17 ENCOUNTER — OFFICE VISIT (OUTPATIENT)
Dept: SURGERY | Facility: CLINIC | Age: 68
End: 2023-05-17
Payer: MEDICARE

## 2023-05-17 VITALS
TEMPERATURE: 98 F | RESPIRATION RATE: 16 BRPM | WEIGHT: 116 LBS | HEART RATE: 88 BPM | BODY MASS INDEX: 19 KG/M2 | DIASTOLIC BLOOD PRESSURE: 79 MMHG | OXYGEN SATURATION: 97 % | SYSTOLIC BLOOD PRESSURE: 129 MMHG

## 2023-05-17 DIAGNOSIS — C49.A2 GASTROINTESTINAL STROMAL TUMOR (GIST) OF STOMACH (HCC): Primary | ICD-10-CM

## 2023-05-17 PROCEDURE — 99024 POSTOP FOLLOW-UP VISIT: CPT | Performed by: SURGERY

## 2023-05-17 PROCEDURE — 1126F AMNT PAIN NOTED NONE PRSNT: CPT | Performed by: SURGERY

## 2023-05-17 PROCEDURE — 1111F DSCHRG MED/CURRENT MED MERGE: CPT | Performed by: SURGERY

## 2023-05-17 NOTE — PROGRESS NOTES
Patient returns today for a follow-up visit visit. She has been doing fairly well. She had several questions which I answered to her satisfaction. Follow-up with Dr. Amparo Johnson. Follow-up with me as needed.

## 2023-05-22 ENCOUNTER — OFFICE VISIT (OUTPATIENT)
Dept: INTERNAL MEDICINE CLINIC | Facility: CLINIC | Age: 68
End: 2023-05-22
Payer: MEDICARE

## 2023-05-22 VITALS
SYSTOLIC BLOOD PRESSURE: 122 MMHG | OXYGEN SATURATION: 98 % | BODY MASS INDEX: 19 KG/M2 | RESPIRATION RATE: 16 BRPM | TEMPERATURE: 97 F | HEART RATE: 80 BPM | WEIGHT: 114.81 LBS | DIASTOLIC BLOOD PRESSURE: 78 MMHG

## 2023-05-22 DIAGNOSIS — E03.9 ACQUIRED HYPOTHYROIDISM: ICD-10-CM

## 2023-05-22 DIAGNOSIS — C49.A2 GASTROINTESTINAL STROMAL TUMOR (GIST) OF STOMACH (HCC): Primary | ICD-10-CM

## 2023-05-22 DIAGNOSIS — R73.03 PREDIABETES: ICD-10-CM

## 2023-05-22 PROCEDURE — 1111F DSCHRG MED/CURRENT MED MERGE: CPT | Performed by: INTERNAL MEDICINE

## 2023-05-22 PROCEDURE — 99214 OFFICE O/P EST MOD 30 MIN: CPT | Performed by: INTERNAL MEDICINE

## 2023-05-22 RX ORDER — LIDOCAINE HYDROCHLORIDE 20 MG/ML
5 SOLUTION OROPHARYNGEAL
Qty: 150 ML | Refills: 0 | Status: SHIPPED | OUTPATIENT
Start: 2023-05-22 | End: 2023-06-01

## 2023-07-10 ENCOUNTER — TELEPHONE (OUTPATIENT)
Dept: PHYSICAL THERAPY | Facility: HOSPITAL | Age: 68
End: 2023-07-10

## 2023-07-10 ENCOUNTER — OFFICE VISIT (OUTPATIENT)
Dept: INTERNAL MEDICINE CLINIC | Facility: CLINIC | Age: 68
End: 2023-07-10
Payer: MEDICARE

## 2023-07-10 VITALS
DIASTOLIC BLOOD PRESSURE: 74 MMHG | HEART RATE: 70 BPM | WEIGHT: 114.19 LBS | BODY MASS INDEX: 19 KG/M2 | TEMPERATURE: 97 F | RESPIRATION RATE: 20 BRPM | OXYGEN SATURATION: 97 % | SYSTOLIC BLOOD PRESSURE: 116 MMHG

## 2023-07-10 DIAGNOSIS — R19.8 ABDOMINAL WEAKNESS: Primary | ICD-10-CM

## 2023-07-10 DIAGNOSIS — E03.9 ACQUIRED HYPOTHYROIDISM: ICD-10-CM

## 2023-07-10 DIAGNOSIS — R73.03 PRE-DIABETES: ICD-10-CM

## 2023-07-10 DIAGNOSIS — C49.A2 GASTROINTESTINAL STROMAL TUMOR (GIST) OF STOMACH (HCC): ICD-10-CM

## 2023-07-10 PROBLEM — R94.31 ABNORMAL ECG: Status: RESOLVED | Noted: 2022-07-13 | Resolved: 2023-07-10

## 2023-07-10 PROBLEM — R07.9 CHEST PAIN, RULE OUT ACUTE MYOCARDIAL INFARCTION: Status: RESOLVED | Noted: 2023-02-23 | Resolved: 2023-07-10

## 2023-07-10 PROBLEM — E11.9 DIABETES MELLITUS TYPE 2 WITHOUT RETINOPATHY (HCC): Status: RESOLVED | Noted: 2018-02-24 | Resolved: 2023-07-10

## 2023-07-10 PROCEDURE — 99214 OFFICE O/P EST MOD 30 MIN: CPT | Performed by: INTERNAL MEDICINE

## 2023-07-11 ENCOUNTER — PATIENT OUTREACH (OUTPATIENT)
Dept: CASE MANAGEMENT | Age: 68
End: 2023-07-11

## 2023-07-11 NOTE — PROCEDURES
The office order for Diabetes registry removal request is Approved and finalized on July 11, 2023.     Thanks,  [facility] [employer]

## 2023-07-31 ENCOUNTER — OFFICE VISIT (OUTPATIENT)
Dept: PHYSICAL THERAPY | Age: 68
End: 2023-07-31
Attending: INTERNAL MEDICINE
Payer: MEDICARE

## 2023-07-31 PROCEDURE — 97110 THERAPEUTIC EXERCISES: CPT

## 2023-07-31 PROCEDURE — 97112 NEUROMUSCULAR REEDUCATION: CPT

## 2023-07-31 PROCEDURE — 97161 PT EVAL LOW COMPLEX 20 MIN: CPT

## 2023-07-31 NOTE — PATIENT INSTRUCTIONS
Thank you for coming to your physical therapy appt! During your appt today you were issued a HEP handout from [employer] which can also be accessed using the information below. The exercises chosen are meant to supplement the treatment you received and reinforce the progress made in physical therapy. It is important to stay consistent with your exercises to help facilitate and maximize your recovery! View at [email or web address] using code: Tiara Griffin

## 2023-07-31 NOTE — PROGRESS NOTES
PHYSICAL THERAPY INITIAL EVALUATION     Date of service: 7/31/2023  Dx: Abdominal weakness (R19.8)        Insurance: MEDICARE PART A&B  Insurance Limits: N/A  Visit #: 1  Authorized # of Visits: N/A  POC/Auth Expiration: N/A  Authorizing Physician/Provider: Rodolfo     PATIENT SUMMARY     History/MIGUEL: \"I had sciatic pain before surgery but that's not an issue before surgery. I went to my PCP who prescribed physical therapy, but I started vomiting and they found a tumor. I didn't have the sciatic therapy because I needed surgery. I had surgery on May 4th and then I had home health. The home physical therapist gave me exercises. I did home health for five weeks and they recommended that I continue with outpatient therapy. They discharged me on July 6th. They recommended therapy for my core. I went back to my PCP and he recommended physical therapy. It was really painful after the surgery. I have pain mostly in the front, but I do have some pain when I sit and lean back in a chair. The home therapist told me that every movement needs to use my abdomen. Home health therapy included basic LE strengthening. Patient is under 10lb lifting restrictions per MD instructions. The patient reports a history of constipation as well. She reports that when she has a bowel movement, she cannot push. She reports that she recently developed a hemerrhoid from pushing. DOI/S: 5/4/23    Aggravating Factors: pulling weeds, bending forward, and lifting things. Alleviating Factors: N/A    PMH: The patient's PMH was reviewed with the patient including allergies, medications, and surgical and medical history.  Patient  has a past medical history of Abdominal cramping, Abnormal menses, Abscess, groin, Acute gastroenteritis (04/15/2004), Anemia, Anxiety, Arthritis, Atypical ductal hyperplasia of breast (07/13/2011), Atypical ductal hyperplasia of breast (03/17/2008), Blind, Bloating, Breast calcifications, Breast nodule (10/07/2009), Cataract, Cervical radiculopathy (10/09/2007), Cervical strain (03/22/2007), Change in hair, Chest pain, CHF (congestive heart failure) ([facility] CHF (congestive heart failure) ([address] Costochondritis, Dehydration (04/15/2004), Depression, Disorder of thyroid, Dry mouth, Dysphagia, Exostosis (11/08/2011), Fatigue (03/06/2012), Fatigue, Feels cold (03/06/2012), HA (headache) (12/09/2014), Hair loss, Hand tingling, Headache(784.0), Hemiparesis ([facility] High blood pressure, High cholesterol, Hip pain, History of bone density study (10/02/2010), HYPERTENSION NOS (08/08/2007), Jaw pain (12/09/2014), Knee pain (05/20/2011), Lateral epicondylitis, LBP (low back pain), Leg pain, Lumbar foraminal stenosis, Lumbar sprain, Memory deficit, Nausea, Neck pain, Night sweats, Nocturia, Nonintractable headache (07/02/2021), Oligomenorrhea, Otalgia, Other and unspecified hyperlipidemia, Palpitation, Paresthesia, PHN (postherpetic neuralgia), Plantar fasciitis (11/08/2011), Positive MAU (antinuclear antibody) (02/06/2019), Positive H. pylori test (2005), Postmenopausal, Rheumatoid factor positive (02/06/2019), Scalp contusion (12/31/2010), Sebaceous cyst, Shingles, Shoulder pain, Sicca syndrome ([facility] (02/06/2019), Sinus disease (05/15/2008), Sinus infection, Sprain of interphalangeal (joint) of hand, Stress, Tendinitis, TIA (transient ischemic attack) (04/07/2012), TIA (transient ischemic attack) (04/07/2012), TMJ syndrome (03/06/2012), TMJ syndrome (03/06/2012), Type II or unspecified type diabetes mellitus without mention of complication, not stated as uncontrolled, Unspecified essential hypertension, URI (upper respiratory infection), Urinary retention, Uterine fibroid (09/05/2002), Visual impairment, Vomiting, Weakness (12/09/2014), Wears glasses, and Weight gain. PLF/Personal Goals: The patient reports that she was able to do planks and push-ups during her workouts with a .  \"I want to go back to working with her, but I need to know that I can use that safely. \" The patient reports that she would like to travel again. She had to cancel all of her vacation plans. She would like to go to [place] to visit her son. Occupation: retired    OBJECTIVE:     Pain/Symptom Presentation: Patient reports pain over the anterior abdomen. Pain at rest: 3-4/10  Pain at worst: 7-8/10 with lifting activities     Outcomes Measure(s):   Not applicable     Activity Measures:  Sleeping: No Activity Limitation: Patient is able to sleep without limitaitons   Sitting: No Activity Limitation: Patient is with some fatigue and pain with sitting unsupported. Bed Transfers: Moderate Activity Limitation: Patient reports difficulty performing bed transfers and occasionally needs assistance. Bending Forward: Mild Activity Limitation: Patient is with mild pain with forward bending. Standing/Walking After Prolonged Sitting: No Activity Limitation: Patient denies any stiffness upon standing/walking after prolonged sitting,   Walking: Moderate Activity Limitation: Patient is with fatigue with walking > 30 minutes. Driving: Mild Activity Limitation: Patient reports some pain with slouching and sitting in driving. Pushing: Severe Activity Limitation: Patient is limiting pushing, pulling, and lifting < 10lbs per MD instructions. Pulling: Severe Activity Limitation: Patient is limiting pushing, pulling, and lifting < 10lbs per MD instructions. Lifting Light Objects: Severe Activity Limitation: Patient is limiting pushing, pulling, and lifting < 10lbs per MD instructions. Lifting Heavy Objects: Extreme Activity Limitation: Patient is limiting pushing, pulling, and lifting < 10lbs per MD instructions. Inspection/Observation: Patient's incisions appear clean, intact without abnormal drainage.    Functional Movement: Patient demonstrates some difficulty with bed transfers and mobility due to core strength deficits, difficulty with supine <--> sit transfers. ROM:   Hip Motion PROM AROM    Right Left Right Left   Hip Flexion [facility]   Hip Extension N/A N/A N/A N/A   Hip ER (at 90deg hip flex) 60 60 N/A N/A   Hip IR (at 90deg hip flex) 30 30 N/A N/A   *indicates activity was associated with pain    Strength/MMT:  Hip Motion Strength    Right Left   Hip Flexion 4-/5 4-/5   Hip Extension 4-/5 4-/5   Hip ABD 4-/5 4-/5   Hip ER 4/5 4/5   Hip IR  4+/5 4+/5   *indicates activity was associated with pain     Special Tests:     SFMA Base Screen:   Multi-Segmental Flexion: DN  Multi-Segmental Extension: DP - stretch across incisions  Multi-Segmental Rotation: (R) DN, (L) DN     Multi-Segmental Flexion SFMA Breakout  Active SLR (70deg): (R) DN, (L) DN  Passive SLR (80deg): (R) DN, (L) DN  Supine Knee to Chest: DN    Multi-Segmental Extension SFMA Breakout  CHANDANA: (R) FN, (L) FN    Multi-Segmental Rotation SFMA Breakout  Active Seated Hip ER (40deg): (R) FN, (L) FN  Passive Seated Hip ER (40deg): (R) FN, (L) FN  Active Seated Hip IR (30deg): (R) FN, (L) FN  Passive Seated Hip IR (30deg): (R) FN, (L) FN  Active Prone Hip ER (40deg): (R) FN, (L) FN  Passive Prone Hip ER (40deg): (R) FN, (L) FN  Active Prone Hip IR (30deg): (R) FN, (L) FN  Passive Prone Hip IR (30deg): (R) FN, (L) FN    ASSESSMENT:     Noah Reyes is a 76year old female that presents to physical therapy evaluation s/p abdominal surgery for a tumor removal 5/4/23. Prior to her surgery, the patient was having sciatica which she was referred to therapy for, but this has since resolved and the patient now presents for physical therapy after completion of home health for progression in core strength, stability, and endurance. Overall, she demonstrates good hip and spinal mobility and flexibility. The patient demonstrates anticipated functional core and hip strength deficits, leading to difficulty with bed mobility and transfers, prolonged walking, and lifting, pushing, and pulling.  The patient is currently under a 10lbs lifting restriction per her MD. She report difficulty and fatigue after grocery shopping and her long-term goal would be to resume resistance training workouts with her  as well as traveling so she can go to [place] to visit her son. The patient was educated on the importance and regular use of core muscles for ADL's and household activities. The patient would benefit from physical therapy to facilitate improved hip and core strength for improved tolerance to ADL's and resuming PLF. Precautions/WB Status: WBAT  Education or Treatment Limitation(s): None  Rehab Potential: Good    TREATMENT:     Initial Evaluation: x 20min     Therapeutic Exercise: x 10min  Patient Education: Patient was educated on anatomy and pathophysiology of current condition, rationale for physical therapy, anticipated treatment interventions, prognosis, timeline for recovery, and expected functional outcomes based on evaluative findings. Patient was educated on the importance of compliance with consistent treatment and HEP to achieve mutually established goals. Administered HEP: Issued and reviewed HEP handout, exercise selection, and recommended resistance. Provided verbal and written instructions/cueing for proper technique and common errors/compensations as needed. Neuromuscular Re-education: x 10min  Hookyling PPT: x 20   TA lateral finger push abdominal activation: x 10  Diaphragmatic breathing: x 10   ADD squeeze: 2 x 10 x 5\"     Home Exercise Program: Patient was issued a HEP handout 7/31/2023 including hooklying PPT, TvA lateral finger push abdominal activation, diaphragmatic breathing, and ADD squeeze. Provider Interactions With Patient:   Patient education was provided as described above. All patient's questions were answered and the patient denied further comments, complaints, or concerns upon departure.    Patient was issued an appt list and verbally confirmed the next appt date and time to ensure consistency with physical therapy attendance. Charges: PT Eval Low Complexity Complexity x 1, Therex x 1, NMR x 1  Total Timed Treatment: 20min       Total Treatment Time: 40min     PLAN OF CARE:      Goals:  Short-Term Goals:  Patient will improve core strength to facilitate supine to and from sit transfers without need for assistance. Timeframe: 3-4 weeks. Patient will improve core endurance to facilitate walking 1 mile daily for household chores community integration. Timeframe: 3-4 weeks. Long-Term Goals:  Patient will improve core strength, stability, and endurance to facilitate pushing, pulling, and lifting objects up to 20lbs for ADL's, household chores, and grocery shopping. Timeframe: 4-6 weeks. Patient will improve core endurance to facilitate walking > 2 miles daily for community integration. Timeframe: 6 weeks. Patient will improve core strength, stability, and endurance to facilitate pushing, pulling, and lifting objects up to 40lbs to manage luggage for traveling and resistance training workouts for general health and fitness. Timeframe: 6-8 weeks. Plan Frequency / Duration: Patient will be seen for 2x/week for 6 weeks, for a total of 12 visits, over a 90 day period. We will re-evaluate the patient at that time in order to determine functional progress, evaluate short-term goal completion, and establish an updated plan of care. Possible treatment interventions will/may include: Therapeutic Activities, Therapeutic Exercise, Neuromuscular Re-education, Manual Therapy, Home Exercise Program Instruction, Patient Education, Self-Care/Home Management, and Modalities as needed. Patient/Family was advised of these findings, precautions, and treatment options and has agreed to actively participate in planning and for this course of care. Thank you for your referral. Please co-sign or sign and return this letter via fax as soon as possible to [phone].  If you have any questions, please contact me at Dept: [phone]. Sincerely,    X___Janelle Edouard, PT, DPT, SCS, ATC, CSCS____ Date: _____7/31/2023________    Electronically signed by therapist: Nate Mcmahan, PT  [de-identified] certification required: Yes  I certify the need for these services furnished under this plan of treatment and while under my care.

## 2023-08-03 ENCOUNTER — OFFICE VISIT (OUTPATIENT)
Dept: PHYSICAL THERAPY | Age: 68
End: 2023-08-03
Attending: INTERNAL MEDICINE
Payer: MEDICARE

## 2023-08-03 DIAGNOSIS — R19.8 ABDOMINAL WEAKNESS: Primary | ICD-10-CM

## 2023-08-03 PROCEDURE — 97110 THERAPEUTIC EXERCISES: CPT

## 2023-08-03 PROCEDURE — 97112 NEUROMUSCULAR REEDUCATION: CPT

## 2023-08-03 NOTE — PROGRESS NOTES
Date of Service: 8/3/2023  Dx: Abdominal weakness (R19.8)         DOI/S: 5/4/23        Insurance: MEDICARE PART A&B  Insurance Limits: N/A  Visit #: 2  Authorized # of Visits: N/A  POC/Auth Expiration: N/A  Date of Last PN: 7/31/23 (Visit #1)  Authorizing Physician/Provider: Adelaide Hines MD visit: N/A  Fall Risk: Standard         Precautions: 10lb lifting restriction            Subjective: The patient reports that she was confused with her exercises at home. Pain/Symptom Presentation:   Pain at rest: 3-4/10  Pain at worst: 8/10     Objective:       Treatment:    Therapeutic Exercise: x 15min  ADD squeeze: 2 x 10 x 5\"   SLR: 2 x 10 (B)   Bridges: 2 x 10 (B)   S/L hip ABD: 2  x10 (B), yellow theraband at knees  S/L clamshell: 2 x 10 (B), yellow theraband     Neuromuscular Re-education: x 25min  Hookyling PPT: x 20 - reviewed proper technique  Diaphragmatic breathing: x 10   TvA lateral finger push abdominal activation: x 10, patient had a difficult time understanding despite verbal and manual cueing   TvA abdominal activation:1 x 10 using buzzing sound  PPT marching: 1 x 10 (B)   PPT BKFO: 1 x 10 (B)  Dead bug SB squeeze - bilateral hand push, unilateral leg: x 10 (B)   Pursed lip exhale: x 10     Provider Interactions With Patient:   Added therapeutic exercises as documented, with cueing provided throughout performance to ensure correct technique during exercise. Verbal and manual cueing on proper performance of the prescribed exercises. Assessment: Vonnie had some difficulty with grasping different core activation exercises. The patient had to have it explained multiple times that her hands were used only for feedback to ensure proper exercise performance with diaphragmatic breathing and TvA lateral finger push, that they were not the primary movers of the exercises. We were able to progress her exercises for additional core and hip strengthening.  We updated the patient's HEP and provided additional instructions and removed hands for feedback when able to minimize confusion. Goals:   Short-Term Goals:  Patient will improve core strength to facilitate supine to and from sit transfers without need for assistance. Timeframe: 3-4 weeks. Patient will improve core endurance to facilitate walking 1 mile daily for household chores community integration. Timeframe: 3-4 weeks. Long-Term Goals:  Patient will improve core strength, stability, and endurance to facilitate pushing, pulling, and lifting objects up to 20lbs for ADL's, household chores, and grocery shopping. Timeframe: 4-6 weeks. Patient will improve core endurance to facilitate walking > 2 miles daily for community integration. Timeframe: 6 weeks. Patient will improve core strength, stability, and endurance to facilitate pushing, pulling, and lifting objects up to 40lbs to manage luggage for traveling and resistance training workouts for general health and fitness. Timeframe: 6-8 weeks. Plan: Continue to review core activation and strengthening exercises to maximize patient performance. HEP: Patient was issued a HEP handout 7/31/2023 including hooklying PPT, TvA lateral finger push abdominal activation, diaphragmatic breathing, and ADD squeeze.        Charges: NMR x 2, Therex x 1         Total Timed Treatment: 40min    Total Treatment Time: 40min

## 2023-08-04 ENCOUNTER — HOSPITAL ENCOUNTER (OUTPATIENT)
Dept: CT IMAGING | Facility: HOSPITAL | Age: 68
Discharge: HOME | End: 2023-08-04
Attending: INTERNAL MEDICINE
Payer: MEDICARE

## 2023-08-04 ENCOUNTER — TELEPHONE (OUTPATIENT)
Dept: INTERNAL MEDICINE CLINIC | Facility: CLINIC | Age: 68
End: 2023-08-04

## 2023-08-04 ENCOUNTER — TELEPHONE (OUTPATIENT)
Dept: HEMATOLOGY/ONCOLOGY | Facility: HOSPITAL | Age: 68
End: 2023-08-04

## 2023-08-04 DIAGNOSIS — C49.A2 GASTROINTESTINAL STROMAL TUMOR (GIST) OF STOMACH (HCC): ICD-10-CM

## 2023-08-04 DIAGNOSIS — E78.2 MIXED HYPERLIPIDEMIA: Primary | ICD-10-CM

## 2023-08-04 LAB
CREAT BLD-MCNC: 0.5 MG/DL
EGFRCR SERPLBLD CKD-EPI 2021: 102 ML/MIN/1.73M2 (ref 60–?)

## 2023-08-04 PROCEDURE — 74177 CT ABD & PELVIS W/CONTRAST: CPT | Performed by: INTERNAL MEDICINE

## 2023-08-04 PROCEDURE — 82565 ASSAY OF CREATININE: CPT

## 2023-08-04 PROCEDURE — 71260 CT THORAX DX C+: CPT | Performed by: INTERNAL MEDICINE

## 2023-08-04 NOTE — TELEPHONE ENCOUNTER
Orders to     [facility]        Pt aware to get labs done no sooner than 2 weeks prior to the appt. Pt aware to fast.  No call back required.     Future Appointments   Date Time Provider Doyle Angel   10/30/2023  1:40 PM Jane Taylor MD [facility]

## 2023-08-07 ENCOUNTER — APPOINTMENT (OUTPATIENT)
Dept: PHYSICAL THERAPY | Age: 68
End: 2023-08-07
Attending: INTERNAL MEDICINE
Payer: MEDICARE

## 2023-08-07 ENCOUNTER — TELEPHONE (OUTPATIENT)
Dept: HEMATOLOGY/ONCOLOGY | Facility: HOSPITAL | Age: 68
End: 2023-08-07

## 2023-08-07 DIAGNOSIS — R91.1 LUNG NODULE: Primary | ICD-10-CM

## 2023-08-07 NOTE — TELEPHONE ENCOUNTER
CT reviewed. 1. There are 2 small left upper lobe lung nodules identified, 1 of which is stable and 1 of which appears slightly more prominent although this could reflect differences in technique given the small size of the nodule. Surveillance imaging recommended. Differential includes benign granulomatous lesions. Surveillance recommended to exclude the less likelihood of metastatic disease. 2. No evidence for residual or recurrent neoplasm within the abdomen or pelvis. 3. Fibroid uterus. 4. Multiple incidental findings noted as detailed above. Lung nodule 1 mm to 2 mm. Will repeat a CT Chest without contrast in 3 months.

## 2023-08-09 ENCOUNTER — OFFICE VISIT (OUTPATIENT)
Dept: PHYSICAL THERAPY | Age: 68
End: 2023-08-09
Attending: INTERNAL MEDICINE
Payer: MEDICARE

## 2023-08-09 PROCEDURE — 97112 NEUROMUSCULAR REEDUCATION: CPT

## 2023-08-09 PROCEDURE — 97110 THERAPEUTIC EXERCISES: CPT

## 2023-08-09 NOTE — PROGRESS NOTES
Date of Service: 8/9/2023  Dx: Abdominal weakness (R19.8)         DOI/S: 5/4/23        Insurance: MEDICARE PART A&B  Insurance Limits: N/A  Visit #: 3  Authorized # of Visits: N/A  POC/Auth Expiration: N/A  Date of Last PN: 7/31/23 (Visit #1)  Authorizing Physician/Provider: Maria C Hines MD visit: N/A  Fall Risk: Standard         Precautions: 10lb lifting restriction            Subjective: \"I'm doing okay. It was so cold this morning and I'm feeling pain in the back of the legs and down into the feet. It was kind of a numbness pain. \" The patient reports that she has had sciatica in the past and it feels similar to that. The patient reports that her pain started after a 30 minute walk this morning. Pain/Symptom Presentation:   Pain at rest: 4/10  Pain at worst: 8/10     Objective:       Treatment:    Therapeutic Exercise: x 15min  ADD squeeze: 2 x 10 x 5\"   SLR: 2 x 10 (B)   Bridges: 2 x 10 (B)   S/L hip ABD: 2  x10 (B), yellow theraband at knees  S/L clamshell: 2 x 10 (B), yellow theraband   Supine wipers: 1 x 10     Neuromuscular Re-education: x 25min  Diaphragmatic breathing: x 10   PPT marching: 2 x 10 (B), alternating  PPT BKFO: 1 x 10 (B)  PPT with LTR: x 10 (B)   Dead bug opp hand to opp knee: 1 x 10   Dead bug SB squeeze - bilateral hand push, unilateral leg: x 10 (B)   Pursed lip exhale: x 10   SB hug: 1 x 10   SB marching: 1 x 10   SB circles: 1 x 10 CW/CCW    Provider Interactions With Patient:   Added therapeutic exercises as documented, with cueing provided throughout performance to ensure correct technique during exercise. Verbal and manual cueing on proper performance of the prescribed exercises. Assessment: Vonnie reports some symptoms that sound like radicular symptoms down bilateral LE's after a long walk. The patient was educated that due to remaining core strength deficits, her low back doesn't have as much support which may be causing some irritation to the spine and/or nerve roots.  We continue to work on progressing core strengthening exercises. The patient is demonstrating improving performance with her HEP. We will continue to progress core strengthening as tolerated. The patient inquired about starting to work with her , which she was instructed might take a few weeks still. Goals:   Short-Term Goals:  Patient will improve core strength to facilitate supine to and from sit transfers without need for assistance. Timeframe: 3-4 weeks. Patient will improve core endurance to facilitate walking 1 mile daily for household chores community integration. Timeframe: 3-4 weeks. Long-Term Goals:  Patient will improve core strength, stability, and endurance to facilitate pushing, pulling, and lifting objects up to 20lbs for ADL's, household chores, and grocery shopping. Timeframe: 4-6 weeks. Patient will improve core endurance to facilitate walking > 2 miles daily for community integration. Timeframe: 6 weeks. Patient will improve core strength, stability, and endurance to facilitate pushing, pulling, and lifting objects up to 40lbs to manage luggage for traveling and resistance training workouts for general health and fitness. Timeframe: 6-8 weeks. Plan: Continue to review core activation and strengthening exercises to maximize patient performance. HEP: Patient was issued a HEP handout 7/31/2023 including hooklying PPT, TvA lateral finger push abdominal activation, diaphragmatic breathing, and ADD squeeze.        Charges: NMR x 2, Therex x 1         Total Timed Treatment: 40min    Total Treatment Time: 40min

## 2023-08-14 ENCOUNTER — OFFICE VISIT (OUTPATIENT)
Dept: PHYSICAL THERAPY | Age: 68
End: 2023-08-14
Attending: INTERNAL MEDICINE
Payer: MEDICARE

## 2023-08-14 PROCEDURE — 97112 NEUROMUSCULAR REEDUCATION: CPT

## 2023-08-14 PROCEDURE — 97110 THERAPEUTIC EXERCISES: CPT

## 2023-08-14 RX ORDER — LEVOTHYROXINE SODIUM 0.03 MG/1
TABLET ORAL
Qty: 90 TABLET | Refills: 0 | Status: SHIPPED | OUTPATIENT
Start: 2023-08-14

## 2023-08-14 NOTE — TELEPHONE ENCOUNTER
Thyroid Supplements Protocol [id number]/2023 06:56 AM   Protocol Details TSH test in past 12 months    TSH value between 0.350 and 5.500 IU/ml    Appointment in past 12 or next 3 months          Appt upcoming 10/23 with Dr. Adelaide Meza

## 2023-08-14 NOTE — PROGRESS NOTES
Date of Service: 8/14/2023  Dx: Abdominal weakness (R19.8)         DOI/S: 5/4/23        Insurance: MEDICARE PART A&B  Insurance Limits: N/A  Visit #: 4  Authorized # of Visits: N/A  POC/Auth Expiration: N/A  Date of Last PN: 7/31/23 (Visit #1)  Authorizing Physician/Provider: Maria C Hines MD visit: N/A  Fall Risk: Standard         Precautions: 10lb lifting restriction            Subjective: Vonnie states that she feels like her core has gotten stronger since starting therapy: getting in/out of bed is getting easier. Turning over and lifting her head to adjust her pillow are also getting easier. She walked a little further on Saturday than she's used to and was \"super tired\" on Sunday. Still feeling like she has no energy today. Lucero Thomas does not report pain at rest, however states that she experiences abdominal pain with bending over and crouching. Pain in legs is unchanged from last week. Pain/Symptom Presentation:   Pain at rest: 0/10  Pain at worst: 4-5/10     Objective:       Treatment:    Therapeutic Exercise: x 15min  SLR: 2 x 10 (B)   Bridges: 2 x 10 (B)   S/L hip ABD: 2  x10 (B), yellow theraband at knees  S/L clamshell: 2 x 10 (B), yellow theraband   Supine wipers: 2 x 10     Neuromuscular Re-education: x 25min  PPT marching: 2 x 10 (B), alternating  Dead bug opp hand to opp knee: 2 x 10   SB hug: 1 x 10   Cat Cows: 1 x 10, pain with lumbar extension  SB marching: 1 x 10   SB circles: 1 x 10 CW/CCW    Provider Interactions With Patient:   Added therapeutic exercises as documented, with cueing provided throughout performance to ensure correct technique during exercise. Verbal and manual cueing on proper performance of the prescribed exercises. Assessment: Vonnie tolerated session well this date and has remained compliant with HEP. Her subjective statement supports functional improvement with bed mobility secondary to increased core stability and strength.  Reports back pain with bending over and crouching; educated on remaining core strength deficits and how the low back may be irritated, especially in bent over position, due to limited support. Will continue to progress core strengthening as tolerated. Vonnie inquired about PT for her sciatica upon end of session. Describes that she had a PT script for sciatica prior to MD finding tumor in abdomen. She was never treated for the sciatica, but we discussed continuation of therapy for sciatica following abdominal rehab sessions if symptoms persist and she obtains new script from doc. Goals:   Short-Term Goals:  Patient will improve core strength to facilitate supine to and from sit transfers without need for assistance. Timeframe: 3-4 weeks. Patient will improve core endurance to facilitate walking 1 mile daily for household chores community integration. Timeframe: 3-4 weeks. Long-Term Goals:  Patient will improve core strength, stability, and endurance to facilitate pushing, pulling, and lifting objects up to 20lbs for ADL's, household chores, and grocery shopping. Timeframe: 4-6 weeks. Patient will improve core endurance to facilitate walking > 2 miles daily for community integration. Timeframe: 6 weeks. Patient will improve core strength, stability, and endurance to facilitate pushing, pulling, and lifting objects up to 40lbs to manage luggage for traveling and resistance training workouts for general health and fitness. Timeframe: 6-8 weeks. Plan: Continue to progress core activation and strengthening exercises to improve overall stability and maximize functional performance. HEP: Patient was issued a HEP handout 7/31/2023 including hooklying PPT, TvA lateral finger push abdominal activation, diaphragmatic breathing, and ADD squeeze.        Charges: NMR x 2, Therex x 1         Total Timed Treatment: 40min    Total Treatment Time: 40min

## 2023-08-15 ENCOUNTER — OFFICE VISIT (OUTPATIENT)
Dept: HEMATOLOGY/ONCOLOGY | Facility: HOSPITAL | Age: 68
End: 2023-08-15
Attending: INTERNAL MEDICINE
Payer: MEDICARE

## 2023-08-15 VITALS
TEMPERATURE: 98 F | RESPIRATION RATE: 16 BRPM | HEIGHT: 65 IN | SYSTOLIC BLOOD PRESSURE: 142 MMHG | HEART RATE: 70 BPM | OXYGEN SATURATION: 97 % | WEIGHT: 114 LBS | BODY MASS INDEX: 18.99 KG/M2 | DIASTOLIC BLOOD PRESSURE: 83 MMHG

## 2023-08-15 DIAGNOSIS — C49.A2 GASTROINTESTINAL STROMAL TUMOR (GIST) OF STOMACH (HCC): Primary | ICD-10-CM

## 2023-08-15 LAB
ALBUMIN SERPL-MCNC: 3.8 G/DL (ref 3.4–5)
ALBUMIN/GLOB SERPL: 1.2 {RATIO} (ref 1–2)
ALP LIVER SERPL-CCNC: 72 U/L
ALT SERPL-CCNC: 32 U/L
ANION GAP SERPL CALC-SCNC: 5 MMOL/L (ref 0–18)
AST SERPL-CCNC: 20 U/L (ref 15–37)
BASOPHILS # BLD AUTO: 0.03 X10(3) UL (ref 0–0.2)
BASOPHILS NFR BLD AUTO: 0.5 %
BILIRUB SERPL-MCNC: 0.4 MG/DL (ref 0.1–2)
BUN BLD-MCNC: 24 MG/DL (ref 7–18)
CALCIUM BLD-MCNC: 9.1 MG/DL (ref 8.5–10.1)
CHLORIDE SERPL-SCNC: 105 MMOL/L (ref 98–112)
CO2 SERPL-SCNC: 26 MMOL/L (ref 21–32)
CREAT BLD-MCNC: 0.47 MG/DL
DEPRECATED HBV CORE AB SER IA-ACNC: 61.5 NG/ML
EGFRCR SERPLBLD CKD-EPI 2021: 104 ML/MIN/1.73M2 (ref 60–?)
EOSINOPHIL # BLD AUTO: 0.04 X10(3) UL (ref 0–0.7)
EOSINOPHIL NFR BLD AUTO: 0.7 %
ERYTHROCYTE [DISTWIDTH] IN BLOOD BY AUTOMATED COUNT: 13.1 %
GLOBULIN PLAS-MCNC: 3.2 G/DL (ref 2.8–4.4)
GLUCOSE BLD-MCNC: 108 MG/DL (ref 70–99)
HCT VFR BLD AUTO: 37.6 %
HGB BLD-MCNC: 12.9 G/DL
IMM GRANULOCYTES # BLD AUTO: 0.01 X10(3) UL (ref 0–1)
IMM GRANULOCYTES NFR BLD: 0.2 %
IRON SATN MFR SERPL: 25 %
IRON SERPL-MCNC: 78 UG/DL
LYMPHOCYTES # BLD AUTO: 1.65 X10(3) UL (ref 1–4)
LYMPHOCYTES NFR BLD AUTO: 29.7 %
MCH RBC QN AUTO: 29.3 PG (ref 26–34)
MCHC RBC AUTO-ENTMCNC: 34.3 G/DL (ref 31–37)
MCV RBC AUTO: 85.3 FL
MONOCYTES # BLD AUTO: 0.34 X10(3) UL (ref 0.1–1)
MONOCYTES NFR BLD AUTO: 6.1 %
NEUTROPHILS # BLD AUTO: 3.48 X10 (3) UL (ref 1.5–7.7)
NEUTROPHILS # BLD AUTO: 3.48 X10(3) UL (ref 1.5–7.7)
NEUTROPHILS NFR BLD AUTO: 62.8 %
OSMOLALITY SERPL CALC.SUM OF ELEC: 287 MOSM/KG (ref 275–295)
PLATELET # BLD AUTO: 152 10(3)UL (ref 150–450)
POTASSIUM SERPL-SCNC: 4.1 MMOL/L (ref 3.5–5.1)
PROT SERPL-MCNC: 7 G/DL (ref 6.4–8.2)
RBC # BLD AUTO: 4.41 X10(6)UL
SODIUM SERPL-SCNC: 136 MMOL/L (ref 136–145)
TIBC SERPL-MCNC: 311 UG/DL (ref 240–450)
TRANSFERRIN SERPL-MCNC: 209 MG/DL (ref 200–360)
WBC # BLD AUTO: 5.6 X10(3) UL (ref 4–11)

## 2023-08-15 PROCEDURE — 99215 OFFICE O/P EST HI 40 MIN: CPT | Performed by: INTERNAL MEDICINE

## 2023-08-15 NOTE — PROGRESS NOTES
Outpatient Oncology Care Plan   Problem list:   fatigue   Problems related to:   self care   Interventions:   provided general teaching   Expected outcomes:   understands plan of care   Progress towards outcome: making progress     Education Record   Learner: Patient   Barriers / Limitations: None   Method: Discussion   Outcome: Shows understanding   Comments:  Patient here for follow-up. Had recent CT scan performed. Would like to discuss findings in depth. Energy level are still low. Having constipation. Having more noticeable headaches and feels cold often.

## 2023-08-16 ENCOUNTER — OFFICE VISIT (OUTPATIENT)
Dept: PHYSICAL THERAPY | Age: 68
End: 2023-08-16
Attending: INTERNAL MEDICINE
Payer: MEDICARE

## 2023-08-16 PROCEDURE — 97110 THERAPEUTIC EXERCISES: CPT

## 2023-08-16 PROCEDURE — 97112 NEUROMUSCULAR REEDUCATION: CPT

## 2023-08-16 NOTE — PROGRESS NOTES
Date of Service: 8/16/2023  Dx: Abdominal weakness (R19.8)         DOI/S: 5/4/23        Insurance: MEDICARE PART A&B  Insurance Limits: N/A  Visit #: 5  Authorized # of Visits: N/A  POC/Auth Expiration: N/A  Date of Last PN: 7/31/23 (Visit #1)  Authorizing Physician/Provider: Rod Hines MD visit: N/A  Fall Risk: Standard         Precautions: 10lb lifting restriction            Subjective: Vonnie states that daily tasks have been getting easier since starting therapy, such as sitting up and rolling over. She reports that she has not noticed any leg pain since last session. Objective:     Pain/Symptom Presentation:   Pain at rest: 0/10  Pain at worst: 4/10     Treatment:    Therapeutic Exercise: x 15min  HEP: 90/90 isometric hold, cat cows, bridges, bird dog, and dead bug with opp elbow to opp knee  SLR: 2 x 10 (B)   Bridges: 2 x 10 (B)   S/L hip ABD: 2  x10 (B), yellow theraband at knees  S/L clamshell: 2 x 10 (B), yellow theraband   Supine wipers: 2 x 10   Adduction squeezes: 2 x 10  HEP update: Reviewed new HEP handout with new exercises and progressions, provided verbal and written instructions/cueing for proper technique and common errors/compensations as needed. Reviewed the importance of compliance with home exercise program to facilitate recovery and meet long-term goals. Neuromuscular Re-education: x 25min  PPT marching: 2 x 10 (B), alternating  Dead bug opp hand to opp knee: 2 x 10   SB hug: 1 x 10   Cat Cows: 1 x 10  SB marching: 1 x 10   SB circles: 1 x 10 CW/CCW  Bird dog: 1 x 10 (B)  Assisted curl up: 1 x 10, yellow power band  90/90 isometric hold: 2 x 20s    Provider Interactions With Patient:   Provided patient with a written copy of exercise instruction which was also documented in patient's electronic medical chart. Assessment: Vonnie tolerated all exercises utilized during this session and continues to demonstrate compliance with HEP.  She reports throughout session that the therapeutic exercises are getting easier for her. The patient was issued a new HEP with more challenging core activities involving bigger extremity motions. Plan to progress exercises to involve larger external forces for her core to remain stable against. Magdaline Siemens would continue to benefit from OP PT in order to enhance core stability during functional movements and return patient to PLOF. Goals:   Short-Term Goals:  Patient will improve core strength to facilitate supine to and from sit transfers without need for assistance. Timeframe: 3-4 weeks. Patient will improve core endurance to facilitate walking 1 mile daily for household chores community integration. Timeframe: 3-4 weeks. Long-Term Goals:  Patient will improve core strength, stability, and endurance to facilitate pushing, pulling, and lifting objects up to 20lbs for ADL's, household chores, and grocery shopping. Timeframe: 4-6 weeks. Patient will improve core endurance to facilitate walking > 2 miles daily for community integration. Timeframe: 6 weeks. Patient will improve core strength, stability, and endurance to facilitate pushing, pulling, and lifting objects up to 40lbs to manage luggage for traveling and resistance training workouts for general health and fitness. Timeframe: 6-8 weeks. Plan: Continue to progress core strength and activation exercises to improve stability and overall function. Follow up on tolerance to updated HEP.      HEP: Patient was issued a HEP handout 8/16/2023 including 90/90 isometric hold, cat cows, bridges, bird dog, and dead bug with opp elbow to opp knee      Charges: NMR x 2, Therex x 1         Total Timed Treatment: 40min    Total Treatment Time: 40min

## 2023-08-16 NOTE — PATIENT INSTRUCTIONS
HEP update: Reviewed new HEP handout with new exercises and progressions, provided verbal and written instructions/cueing for proper technique and common errors/compensations as needed. Reviewed the importance of compliance with home exercise program to facilitate recovery and meet long-term goals. View at [email or web address] using code: Fermin Roper

## 2023-08-21 ENCOUNTER — OFFICE VISIT (OUTPATIENT)
Dept: PHYSICAL THERAPY | Age: 68
End: 2023-08-21
Attending: INTERNAL MEDICINE
Payer: MEDICARE

## 2023-08-21 PROCEDURE — 97110 THERAPEUTIC EXERCISES: CPT

## 2023-08-21 PROCEDURE — 97112 NEUROMUSCULAR REEDUCATION: CPT

## 2023-08-21 NOTE — PROGRESS NOTES
Date of Service: 8/21/2023  Dx: Abdominal weakness (R19.8)         DOI/S: 5/4/23        Insurance: MEDICARE PART A&B  Insurance Limits: N/A  Visit #: 6  Authorized # of Visits: N/A  POC/Auth Expiration: N/A  Date of Last PN: 7/31/23 (Visit #1)  Authorizing Physician/Provider: Beltran Hines MD visit: N/A  Fall Risk: Standard         Precautions: 10lb lifting restriction            Subjective: Vonnie states that things have been better, specifically getting in and out of bed is easier. She reports that her son came to visit her and said that she was walking and moving faster. Updated HEP is going well with just a few clarifying questions. Objective:     Pain/Symptom Presentation:   Pain at rest: 0/10  Pain at worst: 4/10     Treatment:    Therapeutic Exercise: x 12 min  HEP: 90/90 isometric hold, cat cows, bridges, bird dog, and dead bug with opp elbow to opp knee  SLR: 2 x 10 (B)   Bridges: 2 x 10 (B)   S/L hip ABD: 2  x10 (B), red theraband at knees  S/L clamshell: 2 x 10 (B), red theraband   Supine wipers: 2 x 10     Neuromuscular Re-education: x 23 min  PPT marching: 2 x 10 (B), alternating  Dead bug opp hand to opp knee: 2 x 10   SB hug: 1 x 10   Cat Cows: 1 x 10, pain free range during lowering phase  SB marching: 1 x 10   SB circles: 1 x 10 CW/CCW  Bird dog: 1 x 10 (B)  Assisted curl up: 1 x 10, yellow power band  90/90 heel taps: 1 x 10 (B)    Provider Interactions With Patient:   Verbal and manual cueing on proper performance of the prescribed exercises. Patient was given clearance this date to reach out to her  for a more generalized fitness program.     Assessment: Samantha Iqbal has been able to progress all core exercises within an appropriate time frame and continues to demonstrate compliance with HEP. She has improved overall quality and speed of movement to the extent that outside observers (her son) can notice.  We were able to progress to red theraband this session for hip strengthening exercises, which seemed tolerable to the patient. Were also able to progress from an isometric 90/90 hold to alternating heel taps to increase the external forces acting on the body while Vonnie is holding her core stable. She was given clearance this date to reach out to her  for a more generalized fitness program. We are very pleased with Vonnie's progress; she would continue to benefit from skilled PT intervention to increase the challenge to her core with external forces and prepare her for loaded functional activities. Goals:   Short-Term Goals:  Patient will improve core strength to facilitate supine to and from sit transfers without need for assistance. Timeframe: 3-4 weeks. Patient will improve core endurance to facilitate walking 1 mile daily for household chores community integration. Timeframe: 3-4 weeks. Long-Term Goals:  Patient will improve core strength, stability, and endurance to facilitate pushing, pulling, and lifting objects up to 20lbs for ADL's, household chores, and grocery shopping. Timeframe: 4-6 weeks. Patient will improve core endurance to facilitate walking > 2 miles daily for community integration. Timeframe: 6 weeks. Patient will improve core strength, stability, and endurance to facilitate pushing, pulling, and lifting objects up to 40lbs to manage luggage for traveling and resistance training workouts for general health and fitness. Timeframe: 6-8 weeks. Plan: Continue to progress core strength and endurance exercises to improve stability and overall function.      HEP: Patient was issued a HEP handout 8/16/2023 including 90/90 isometric hold, cat cows, bridges, bird dog, and dead bug with opp elbow to opp knee      Charges: NMR x 1, Therex x 1         Total Timed Treatment: 35min    Total Treatment Time: 35min

## 2023-08-23 ENCOUNTER — TELEPHONE (OUTPATIENT)
Dept: PHYSICAL THERAPY | Facility: HOSPITAL | Age: 68
End: 2023-08-23

## 2023-08-24 ENCOUNTER — OFFICE VISIT (OUTPATIENT)
Dept: PHYSICAL THERAPY | Age: 68
End: 2023-08-24
Attending: INTERNAL MEDICINE
Payer: MEDICARE

## 2023-08-24 PROCEDURE — 97110 THERAPEUTIC EXERCISES: CPT

## 2023-08-24 PROCEDURE — 97112 NEUROMUSCULAR REEDUCATION: CPT

## 2023-08-24 NOTE — PATIENT INSTRUCTIONS
Thank you for coming to your physical therapy appt! During your appt today you were issued a HEP handout from [email or web address] which can also be accessed using the information below. The exercises chosen are meant to supplement the treatment you received and reinforce the progress made in physical therapy. It is important to stay consistent with your exercises to help facilitate and maximize your recovery! View at [email or web address] using code:  Evalee Duty

## 2023-08-24 NOTE — PROGRESS NOTES
Date of Service: 8/24/2023  Dx: Abdominal weakness (R19.8)         DOI/S: 5/4/23        Insurance: MEDICARE PART A&B  Insurance Limits: N/A  Visit #: 7  Authorized # of Visits: N/A  POC/Auth Expiration: N/A  Date of Last PN: 7/31/23 (Visit #1)  Authorizing Physician/Provider: Brenda Hines MD visit: N/A  Fall Risk: Standard         Precautions: 10lb lifting restriction            Subjective: Vonnie reports she is feeling good. Explains that she would like to be able to lie down without bringing knees to chest; is wondering why its easier with legs bent than when they're straight. Was able to tolerate sitting 3 hours for a play and 2 hours for the bus ride, which she reports she was unable to do prior to starting PT. Objective:     Pain/Symptom Presentation:   Pain at rest: 0/10  Pain at worst: 4/10     Treatment:    Therapeutic Exercise: x 20min  HEP: Bridge marching, cat cows, dead bug, and bird dog  SLR: 2 x 10 (B), 2# ankle weight  Bridges: 1 x 10 (B)  Bridge marching: 2 x 10 (B)  S/L clamshell: 2 x 10 (B), green theraband   Supine wipers: 2 x 10   HEP update: Reviewed new HEP handout with new exercises and progressions, provided verbal and written instructions/cueing for proper technique and common errors/compensations as needed. Reviewed the importance of compliance with home exercise program to facilitate recovery and meet long-term goals. Neuromuscular Re-education: x 21min  Dead bug: 2 x 10   Cat Cows: 1 x 10, pain free range during lowering phase  SB marching: 1 x 20   SB circles: 1 x 20 CW/CCW  Bird dog: 1 x 10 (B)  Assisted curl up: 2 x 10, yellow power band  90/90 heel taps with leg straight: 1 x 10 (B)  Pallof press: x 10 (B), black power band    Provider Interactions With Patient:   Increased intensity of exercises as documented. Verbal, tactile, and visual cueing on proper performance of the prescribed exercises. Assessment: Vonnie progressed this date to more challenging core exercises. Additional weights and resistance bands were added to exercises she's been practicing for a while. We were also able to add the pallof press, which she stated was challenging but was able to complete. Advanced the 90/90 heel taps even more so that she was straightening and lowering alternating legs. This will help with Vonnie's subjective complaints of having to bring knees to chest when she gets into bed, or for other functional transfers. Vonnie reports she is excited to return to her  after permission was granted last session. We are very pleased with Vonnie's progress and will plan on progressing her to discharge at next session. Goals:   Short-Term Goals:  Patient will improve core strength to facilitate supine to and from sit transfers without need for assistance. Timeframe: 3-4 weeks. Patient will improve core endurance to facilitate walking 1 mile daily for household chores community integration. Timeframe: 3-4 weeks. Long-Term Goals:  Patient will improve core strength, stability, and endurance to facilitate pushing, pulling, and lifting objects up to 20lbs for ADL's, household chores, and grocery shopping. Timeframe: 4-6 weeks. Patient will improve core endurance to facilitate walking > 2 miles daily for community integration. Timeframe: 6 weeks. Patient will improve core strength, stability, and endurance to facilitate pushing, pulling, and lifting objects up to 40lbs to manage luggage for traveling and resistance training workouts for general health and fitness. Timeframe: 6-8 weeks. Plan: Progress to discharge next session. HEP: Patient was issued a HEP handout 8/24/2023 including bridge marching, cat cows, dead bug, and bird dog.     Charges: NMR x 2, Therex x 1         Total Timed Treatment: 41min    Total Treatment Time: 41min

## 2023-08-25 ENCOUNTER — LAB ENCOUNTER (OUTPATIENT)
Dept: LAB | Age: 68
End: 2023-08-25
Attending: INTERNAL MEDICINE
Payer: MEDICARE

## 2023-08-25 DIAGNOSIS — E03.9 ACQUIRED HYPOTHYROIDISM: ICD-10-CM

## 2023-08-25 DIAGNOSIS — R73.03 PREDIABETES: ICD-10-CM

## 2023-08-25 LAB
EST. AVERAGE GLUCOSE BLD GHB EST-MCNC: 120 MG/DL (ref 68–126)
HBA1C MFR BLD: 5.8 % (ref ?–5.7)
TSI SER-ACNC: 2.97 MIU/ML (ref 0.36–3.74)

## 2023-08-25 PROCEDURE — 83036 HEMOGLOBIN GLYCOSYLATED A1C: CPT

## 2023-08-25 PROCEDURE — 84443 ASSAY THYROID STIM HORMONE: CPT

## 2023-08-25 PROCEDURE — 36415 COLL VENOUS BLD VENIPUNCTURE: CPT

## 2023-08-30 ENCOUNTER — OFFICE VISIT (OUTPATIENT)
Dept: PHYSICAL THERAPY | Age: 68
End: 2023-08-30
Attending: INTERNAL MEDICINE
Payer: MEDICARE

## 2023-08-30 PROCEDURE — 97110 THERAPEUTIC EXERCISES: CPT

## 2023-08-30 PROCEDURE — 97112 NEUROMUSCULAR REEDUCATION: CPT

## 2023-09-06 ENCOUNTER — TELEPHONE (OUTPATIENT)
Dept: INTERNAL MEDICINE CLINIC | Facility: CLINIC | Age: 68
End: 2023-09-06

## 2023-09-06 NOTE — TELEPHONE ENCOUNTER
Per CDC guideline     CDC recommends that adults 61years of age and older may receive a single dose of RSV vaccine using shared clinical decision-making (SCDM). This means that health care providers and their patients should have a conversation to determine if RSV vaccination will be beneficial.         AD - Do you recommend for this pt?

## 2023-09-14 ENCOUNTER — APPOINTMENT (OUTPATIENT)
Dept: PHYSICAL THERAPY | Age: 68
End: 2023-09-14
Attending: INTERNAL MEDICINE
Payer: MEDICARE

## 2023-10-05 NOTE — TELEPHONE ENCOUNTER
A1c and TSH completed 8/25/23    CBC, CMP completed 8/15/23    Would you like to add or repeat any labs?

## 2023-10-16 ENCOUNTER — TELEPHONE (OUTPATIENT)
Dept: INTERNAL MEDICINE CLINIC | Facility: CLINIC | Age: 68
End: 2023-10-16

## 2023-10-16 NOTE — TELEPHONE ENCOUNTER
Called and spoke to pt. Advised to hold, AD will discuss further at 10/30 appt. Pt stated understanding and agreed to plan.

## 2023-10-16 NOTE — TELEPHONE ENCOUNTER
Pt stated she started the below medication two weeks ago and she's been getting unbearable headaches and nervousness/anxiety. Pt stated she can't stand, you have to lye down, it's so severe.  Pt stated she stopped taking the medication yesterday, doesn't have sxs today.  High TE      LEVOTHYROXINE 25 MCG Oral Tab 90 tablet 0 8/14/2023    Sig:   TAKE 1 TABLET BY MOUTH BEFORE BREAKFAST

## 2023-10-27 ENCOUNTER — HOSPITAL ENCOUNTER (OUTPATIENT)
Dept: BONE DENSITY | Age: 68
Discharge: HOME | End: 2023-10-27
Attending: OBSTETRICS & GYNECOLOGY

## 2023-10-27 DIAGNOSIS — Z13.820 SCREENING FOR OSTEOPOROSIS: ICD-10-CM

## 2023-10-27 DIAGNOSIS — M85.80 OSTEOPENIA, UNSPECIFIED LOCATION: ICD-10-CM

## 2023-10-27 DIAGNOSIS — N95.1 MENOPAUSAL STATE: ICD-10-CM

## 2023-10-30 ENCOUNTER — OFFICE VISIT (OUTPATIENT)
Dept: INTERNAL MEDICINE CLINIC | Facility: CLINIC | Age: 68
End: 2023-10-30

## 2023-10-30 VITALS
HEART RATE: 75 BPM | TEMPERATURE: 97 F | BODY MASS INDEX: 20.03 KG/M2 | OXYGEN SATURATION: 98 % | DIASTOLIC BLOOD PRESSURE: 70 MMHG | RESPIRATION RATE: 16 BRPM | WEIGHT: 120.19 LBS | HEIGHT: 65 IN | SYSTOLIC BLOOD PRESSURE: 110 MMHG

## 2023-10-30 DIAGNOSIS — M85.89 OSTEOPENIA OF MULTIPLE SITES: ICD-10-CM

## 2023-10-30 DIAGNOSIS — M51.36 DDD (DEGENERATIVE DISC DISEASE), LUMBAR: ICD-10-CM

## 2023-10-30 DIAGNOSIS — R73.03 PREDIABETES: ICD-10-CM

## 2023-10-30 DIAGNOSIS — M19.042 PRIMARY OSTEOARTHRITIS OF BOTH HANDS: ICD-10-CM

## 2023-10-30 DIAGNOSIS — E03.8 SUBCLINICAL HYPOTHYROIDISM: ICD-10-CM

## 2023-10-30 DIAGNOSIS — Z00.00 ENCOUNTER FOR ANNUAL HEALTH EXAMINATION: Primary | ICD-10-CM

## 2023-10-30 DIAGNOSIS — Z13.220 SCREENING FOR LIPID DISORDERS: ICD-10-CM

## 2023-10-30 DIAGNOSIS — D25.9 UTERINE LEIOMYOMA, UNSPECIFIED LOCATION: ICD-10-CM

## 2023-10-30 DIAGNOSIS — Z12.31 ENCOUNTER FOR SCREENING MAMMOGRAM FOR MALIGNANT NEOPLASM OF BREAST: ICD-10-CM

## 2023-10-30 DIAGNOSIS — M47.26 OSTEOARTHRITIS OF SPINE WITH RADICULOPATHY, LUMBAR REGION: ICD-10-CM

## 2023-10-30 DIAGNOSIS — M48.061 LUMBAR FORAMINAL STENOSIS: ICD-10-CM

## 2023-10-30 DIAGNOSIS — C49.A0 GASTROINTESTINAL STROMAL TUMOR (GIST) (HCC): ICD-10-CM

## 2023-10-30 DIAGNOSIS — B35.1 ONYCHOMYCOSIS: ICD-10-CM

## 2023-10-30 DIAGNOSIS — H40.002 GLAUCOMA SUSPECT, LEFT EYE: ICD-10-CM

## 2023-10-30 DIAGNOSIS — M19.041 PRIMARY OSTEOARTHRITIS OF BOTH HANDS: ICD-10-CM

## 2023-10-30 PROBLEM — D69.6 THROMBOCYTOPENIA (HCC): Status: RESOLVED | Noted: 2022-07-12 | Resolved: 2023-10-30

## 2023-10-30 PROBLEM — E87.1 HYPONATREMIA: Status: RESOLVED | Noted: 2022-07-12 | Resolved: 2023-10-30

## 2023-10-30 PROBLEM — R79.89 AZOTEMIA: Status: RESOLVED | Noted: 2023-02-24 | Resolved: 2023-10-30

## 2023-10-30 PROBLEM — E03.9 ACQUIRED HYPOTHYROIDISM: Status: RESOLVED | Noted: 2023-07-10 | Resolved: 2023-10-30

## 2023-10-30 PROBLEM — D69.6 THROMBOCYTOPENIA: Status: RESOLVED | Noted: 2022-07-12 | Resolved: 2023-10-30

## 2023-10-30 PROBLEM — R73.9 HYPERGLYCEMIA: Status: RESOLVED | Noted: 2022-07-12 | Resolved: 2023-10-30

## 2023-10-30 PROBLEM — M51.369 DDD (DEGENERATIVE DISC DISEASE), LUMBAR: Status: ACTIVE | Noted: 2023-10-30

## 2023-10-30 PROBLEM — J32.0 CHRONIC MAXILLARY SINUSITIS: Status: RESOLVED | Noted: 2022-10-27 | Resolved: 2023-10-30

## 2023-10-30 PROBLEM — R55 SYNCOPE, NEAR: Status: RESOLVED | Noted: 2022-07-13 | Resolved: 2023-10-30

## 2023-10-30 PROBLEM — E87.6 HYPOKALEMIA: Status: RESOLVED | Noted: 2023-02-24 | Resolved: 2023-10-30

## 2023-10-30 PROBLEM — Z12.11 SPECIAL SCREENING FOR MALIGNANT NEOPLASM OF COLON: Status: RESOLVED | Noted: 2022-12-06 | Resolved: 2023-10-30

## 2023-10-30 PROBLEM — H04.129 DRY EYE: Status: RESOLVED | Noted: 2021-07-02 | Resolved: 2023-10-30

## 2023-11-02 ENCOUNTER — HOSPITAL ENCOUNTER (OUTPATIENT)
Dept: BONE DENSITY | Age: 68
Discharge: HOME | End: 2023-11-02
Attending: OBSTETRICS & GYNECOLOGY
Payer: MEDICARE

## 2023-11-02 DIAGNOSIS — M85.80 OSTEOPENIA, UNSPECIFIED LOCATION: ICD-10-CM

## 2023-11-02 DIAGNOSIS — Z78.0 POSTMENOPAUSAL: ICD-10-CM

## 2023-11-02 PROCEDURE — 77080 DXA BONE DENSITY AXIAL: CPT | Performed by: OBSTETRICS & GYNECOLOGY

## 2023-11-06 ENCOUNTER — TELEPHONE (OUTPATIENT)
Dept: INTERNAL MEDICINE CLINIC | Facility: CLINIC | Age: 68
End: 2023-11-06

## 2023-11-06 NOTE — TELEPHONE ENCOUNTER
Please have patient get results faxed. Not able to locate result with tscores in care everywhere. Recs to be given pending AD review of dexa and results.

## 2023-11-06 NOTE — TELEPHONE ENCOUNTER
Pt stated her Gyne ordered a Dexa scan for her and she had it done and [facility]and a nurse called her.    Pt was advised to call her PCP to get some labs done and find out what exercises she needs to do.  Pt was advised she has Osteopenia     Pt scheduled on AD just in case she needs an appt with AD - FYI    Future Appointments   Date Time Provider Department Center   11/28/2023 11:40 AM Orion Reynolds MD [facility]

## 2023-11-07 ENCOUNTER — HOSPITAL ENCOUNTER (OUTPATIENT)
Dept: CT IMAGING | Facility: HOSPITAL | Age: 68
Discharge: HOME | End: 2023-11-07
Attending: INTERNAL MEDICINE
Payer: MEDICARE

## 2023-11-07 DIAGNOSIS — R91.1 LUNG NODULE: ICD-10-CM

## 2023-11-07 PROCEDURE — 71250 CT THORAX DX C-: CPT | Performed by: INTERNAL MEDICINE

## 2023-11-07 NOTE — TELEPHONE ENCOUNTER
The ordering provider should be willing to provide recommendations for the patient? If not, then I will recommend the following for now and discuss in person during upcoming office visit:     [email or web address]#:~:text=Weight%2Dbearing%20aerobic%20activities,-Weight%2Dbearing%20aerobic&text=Examples%20include%20walking%2C%20dancing%2C%20low,spine%20to%20slow%20bone%20loss.    Continue current calcium and vitamin D supplements.

## 2023-11-07 NOTE — TELEPHONE ENCOUNTER
Called pt to discuss below. Pt at [facility] to get CT scan, pt said she will call back later today.

## 2023-11-07 NOTE — TELEPHONE ENCOUNTER
Called and spoke w/ pt. Notified AD can discuss with her at OV scheduled. Advised to continue taking Vit D and calcium supplement. Notified pt I will send her MCM with the link Dr. Reynolds shared with us. Pt stated she works with a  and will share that with them. Pt agreeable to plan and will keep apt to discuss further with AD. Pt appreciative of call

## 2023-11-14 ENCOUNTER — OFFICE VISIT (OUTPATIENT)
Dept: HEMATOLOGY/ONCOLOGY | Facility: HOSPITAL | Age: 68
End: 2023-11-14
Attending: INTERNAL MEDICINE
Payer: MEDICARE

## 2023-11-14 VITALS
DIASTOLIC BLOOD PRESSURE: 81 MMHG | SYSTOLIC BLOOD PRESSURE: 136 MMHG | BODY MASS INDEX: 20 KG/M2 | WEIGHT: 117.81 LBS | TEMPERATURE: 97 F | OXYGEN SATURATION: 97 % | HEART RATE: 79 BPM

## 2023-11-14 DIAGNOSIS — C49.A2 GASTROINTESTINAL STROMAL TUMOR (GIST) OF STOMACH (HCC): Primary | ICD-10-CM

## 2023-11-14 LAB
ALBUMIN SERPL-MCNC: 3.7 G/DL (ref 3.4–5)
ALBUMIN/GLOB SERPL: 1 {RATIO} (ref 1–2)
ALP LIVER SERPL-CCNC: 90 U/L
ALT SERPL-CCNC: 31 U/L
ANION GAP SERPL CALC-SCNC: 5 MMOL/L (ref 0–18)
AST SERPL-CCNC: 20 U/L (ref 15–37)
BASOPHILS # BLD AUTO: 0.02 X10(3) UL (ref 0–0.2)
BASOPHILS NFR BLD AUTO: 0.4 %
BILIRUB SERPL-MCNC: 0.4 MG/DL (ref 0.1–2)
BUN BLD-MCNC: 23 MG/DL (ref 9–23)
CALCIUM BLD-MCNC: 9 MG/DL (ref 8.5–10.1)
CHLORIDE SERPL-SCNC: 104 MMOL/L (ref 98–112)
CO2 SERPL-SCNC: 25 MMOL/L (ref 21–32)
CREAT BLD-MCNC: 0.6 MG/DL
EGFRCR SERPLBLD CKD-EPI 2021: 98 ML/MIN/1.73M2 (ref 60–?)
EOSINOPHIL # BLD AUTO: 0.02 X10(3) UL (ref 0–0.7)
EOSINOPHIL NFR BLD AUTO: 0.4 %
ERYTHROCYTE [DISTWIDTH] IN BLOOD BY AUTOMATED COUNT: 12.8 %
FASTING STATUS PATIENT QL REPORTED: NO
GLOBULIN PLAS-MCNC: 3.6 G/DL (ref 2.8–4.4)
GLUCOSE BLD-MCNC: 109 MG/DL (ref 70–99)
HCT VFR BLD AUTO: 37.5 %
HGB BLD-MCNC: 13 G/DL
IMM GRANULOCYTES # BLD AUTO: 0.01 X10(3) UL (ref 0–1)
IMM GRANULOCYTES NFR BLD: 0.2 %
LYMPHOCYTES # BLD AUTO: 1.63 X10(3) UL (ref 1–4)
LYMPHOCYTES NFR BLD AUTO: 29.4 %
MCH RBC QN AUTO: 29.9 PG (ref 26–34)
MCHC RBC AUTO-ENTMCNC: 34.7 G/DL (ref 31–37)
MCV RBC AUTO: 86.2 FL
MONOCYTES # BLD AUTO: 0.35 X10(3) UL (ref 0.1–1)
MONOCYTES NFR BLD AUTO: 6.3 %
NEUTROPHILS # BLD AUTO: 3.52 X10 (3) UL (ref 1.5–7.7)
NEUTROPHILS # BLD AUTO: 3.52 X10(3) UL (ref 1.5–7.7)
NEUTROPHILS NFR BLD AUTO: 63.3 %
OSMOLALITY SERPL CALC.SUM OF ELEC: 282 MOSM/KG (ref 275–295)
PLATELET # BLD AUTO: 164 10(3)UL (ref 150–450)
POTASSIUM SERPL-SCNC: 4 MMOL/L (ref 3.5–5.1)
PROT SERPL-MCNC: 7.3 G/DL (ref 6.4–8.2)
RBC # BLD AUTO: 4.35 X10(6)UL
SODIUM SERPL-SCNC: 134 MMOL/L (ref 136–145)
WBC # BLD AUTO: 5.6 X10(3) UL (ref 4–11)

## 2023-11-14 PROCEDURE — 99215 OFFICE O/P EST HI 40 MIN: CPT | Performed by: INTERNAL MEDICINE

## 2023-11-14 NOTE — PROGRESS NOTES
Outpatient Oncology Care Plan   Problem list:   fatigue   Problems related to:   self care   Interventions:   provided general teaching   Expected outcomes:   understands plan of care   Progress towards outcome: making progress     Education Record   Learner: Patient   Barriers / Limitations: None   Method: Discussion   Outcome: Shows understanding   Comments:  Patient here for follow-up.  Would like to discuss headaches with MD.

## 2023-11-28 ENCOUNTER — OFFICE VISIT (OUTPATIENT)
Dept: INTERNAL MEDICINE CLINIC | Facility: CLINIC | Age: 68
End: 2023-11-28
Payer: MEDICARE

## 2023-11-28 VITALS
OXYGEN SATURATION: 98 % | BODY MASS INDEX: 20.25 KG/M2 | HEART RATE: 72 BPM | HEIGHT: 64 IN | SYSTOLIC BLOOD PRESSURE: 98 MMHG | DIASTOLIC BLOOD PRESSURE: 70 MMHG | WEIGHT: 118.63 LBS

## 2023-11-28 DIAGNOSIS — R73.03 PREDIABETES: ICD-10-CM

## 2023-11-28 DIAGNOSIS — E03.8 SUBCLINICAL HYPOTHYROIDISM: ICD-10-CM

## 2023-11-28 DIAGNOSIS — M85.89 OSTEOPENIA OF MULTIPLE SITES: Primary | ICD-10-CM

## 2023-11-28 PROCEDURE — 99214 OFFICE O/P EST MOD 30 MIN: CPT | Performed by: INTERNAL MEDICINE

## 2023-11-29 ENCOUNTER — LAB ENCOUNTER (OUTPATIENT)
Dept: LAB | Age: 68
End: 2023-11-29
Attending: INTERNAL MEDICINE
Payer: MEDICARE

## 2023-11-29 ENCOUNTER — OFFICE VISIT (OUTPATIENT)
Dept: PODIATRY CLINIC | Facility: CLINIC | Age: 68
End: 2023-11-29

## 2023-11-29 DIAGNOSIS — M85.89 OSTEOPENIA OF MULTIPLE SITES: ICD-10-CM

## 2023-11-29 DIAGNOSIS — M20.41 HAMMER TOE OF RIGHT FOOT: ICD-10-CM

## 2023-11-29 DIAGNOSIS — Z00.00 ENCOUNTER FOR ANNUAL HEALTH EXAMINATION: ICD-10-CM

## 2023-11-29 DIAGNOSIS — M48.061 LUMBAR FORAMINAL STENOSIS: ICD-10-CM

## 2023-11-29 DIAGNOSIS — B35.1 ONYCHOMYCOSIS: Primary | ICD-10-CM

## 2023-11-29 DIAGNOSIS — R73.03 PREDIABETES: ICD-10-CM

## 2023-11-29 DIAGNOSIS — M47.26 OSTEOARTHRITIS OF SPINE WITH RADICULOPATHY, LUMBAR REGION: ICD-10-CM

## 2023-11-29 DIAGNOSIS — Z13.220 SCREENING FOR LIPID DISORDERS: ICD-10-CM

## 2023-11-29 DIAGNOSIS — E03.8 SUBCLINICAL HYPOTHYROIDISM: ICD-10-CM

## 2023-11-29 LAB
CHOLEST SERPL-MCNC: 207 MG/DL (ref ?–200)
EST. AVERAGE GLUCOSE BLD GHB EST-MCNC: 114 MG/DL (ref 68–126)
FASTING PATIENT LIPID ANSWER: YES
HBA1C MFR BLD: 5.6 % (ref ?–5.7)
HDLC SERPL-MCNC: 99 MG/DL (ref 40–59)
LDLC SERPL CALC-MCNC: 99 MG/DL (ref ?–100)
NONHDLC SERPL-MCNC: 108 MG/DL (ref ?–130)
T4 FREE SERPL-MCNC: 0.9 NG/DL (ref 0.8–1.7)
TRIGL SERPL-MCNC: 46 MG/DL (ref 30–149)
TSI SER-ACNC: 5.34 MIU/ML (ref 0.36–3.74)
VIT D+METAB SERPL-MCNC: 55.9 NG/ML (ref 30–100)
VLDLC SERPL CALC-MCNC: 8 MG/DL (ref 0–30)

## 2023-11-29 PROCEDURE — 1126F AMNT PAIN NOTED NONE PRSNT: CPT | Performed by: STUDENT IN AN ORGANIZED HEALTH CARE EDUCATION/TRAINING PROGRAM

## 2023-11-29 PROCEDURE — 36415 COLL VENOUS BLD VENIPUNCTURE: CPT

## 2023-11-29 PROCEDURE — 84443 ASSAY THYROID STIM HORMONE: CPT

## 2023-11-29 PROCEDURE — 80061 LIPID PANEL: CPT

## 2023-11-29 PROCEDURE — 99203 OFFICE O/P NEW LOW 30 MIN: CPT | Performed by: STUDENT IN AN ORGANIZED HEALTH CARE EDUCATION/TRAINING PROGRAM

## 2023-11-29 PROCEDURE — 84439 ASSAY OF FREE THYROXINE: CPT

## 2023-11-29 PROCEDURE — 83036 HEMOGLOBIN GLYCOSYLATED A1C: CPT

## 2023-11-29 PROCEDURE — 82306 VITAMIN D 25 HYDROXY: CPT
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
## 2023-12-13 ENCOUNTER — TELEPHONE (OUTPATIENT)
Dept: PODIATRY CLINIC | Facility: CLINIC | Age: 68
End: 2023-12-13

## 2023-12-13 NOTE — TELEPHONE ENCOUNTER
Pt called pt had an appointment on 11-29-23. Sent piece of nail to the lab.  Call pt with results

## 2023-12-15 ENCOUNTER — HOSPITAL ENCOUNTER (OUTPATIENT)
Dept: MAMMOGRAPHY | Age: 68
Discharge: HOME | End: 2023-12-15
Attending: INTERNAL MEDICINE
Payer: MEDICARE

## 2023-12-15 DIAGNOSIS — Z12.31 ENCOUNTER FOR SCREENING MAMMOGRAM FOR MALIGNANT NEOPLASM OF BREAST: ICD-10-CM

## 2023-12-15 PROCEDURE — 77063 BREAST TOMOSYNTHESIS BI: CPT | Performed by: INTERNAL MEDICINE

## 2023-12-15 PROCEDURE — 77067 SCR MAMMO BI INCL CAD: CPT | Performed by: INTERNAL MEDICINE

## 2023-12-18 ENCOUNTER — PATIENT MESSAGE (OUTPATIENT)
Dept: PODIATRY CLINIC | Facility: CLINIC | Age: 68
End: 2023-12-18

## 2023-12-21 ENCOUNTER — TELEPHONE (OUTPATIENT)
Dept: INTERNAL MEDICINE CLINIC | Facility: CLINIC | Age: 68
End: 2023-12-21

## 2023-12-21 NOTE — TELEPHONE ENCOUNTER
Pt would like to discuss results with AD in person. She stated she is not able to decide on what to do. I scheduled for 20min as she is looking for an appt right away     Future Appointments   Date Time Provider Doyle Angel   1/8/2024  9:40 AM Orion Reynolds MD [facility]      Pt is wondering based on the results ok to wait to discuss until the appt?

## 2023-12-21 NOTE — TELEPHONE ENCOUNTER
If patient still wanting to discuss, I can just call her - let me know.  But I am okay with ordering mri of the breast.

## 2023-12-21 NOTE — TELEPHONE ENCOUNTER
Per result note, pt has checked with insurance and is fine for either breast US or MRI. Pt now wanting appt with AD to discuss. AD, are you ok with 20 min appt?

## 2023-12-21 NOTE — TELEPHONE ENCOUNTER
Result note sent back to clinical result bucket to call pt. She read [facility] regarding mammo results.

## 2023-12-22 NOTE — TELEPHONE ENCOUNTER
Called and spoke w/ pt. Notified AD stated can call pt to discuss if she would like or he can order imaging. Pt stated she would like to talk with AD to discuss this as she doesn't know which test is better or which one to do. Pt is worried given her history of abd tumor and wants to know what test AD suggests. Pt is ok with keeping 1/8/24 apt if AD is ok with pt waiting until then. No sooner apts with AD. Pt stated she can either keep apt or can talk on the phone. Pt very appreciative of phone call. AD - Ok with apt 1/8 or would you add pt on sooner or call sooner? Or non-urgent and can keep apt as scheduled?

## 2023-12-27 NOTE — TELEPHONE ENCOUNTER
Pt called back to state she would like to speak to AD. Advised I will let him know and it will be after hours (did not realize it was AD 1/2 day and informed likely after 5-6; sooner should not be an issue, just FYRADHA).

## 2023-12-27 NOTE — TELEPHONE ENCOUNTER
Spoke to pt. Aware breast US ordered and given number to schedule. Pt wanted to keep appt for 1/8 at this time.

## 2024-01-04 ENCOUNTER — OFFICE VISIT (OUTPATIENT)
Facility: CLINIC | Age: 69
End: 2024-01-04
Payer: MEDICARE

## 2024-01-04 VITALS
HEIGHT: 64 IN | BODY MASS INDEX: 20.32 KG/M2 | RESPIRATION RATE: 16 BRPM | HEART RATE: 98 BPM | OXYGEN SATURATION: 98 % | SYSTOLIC BLOOD PRESSURE: 124 MMHG | WEIGHT: 119 LBS | DIASTOLIC BLOOD PRESSURE: 76 MMHG

## 2024-01-04 DIAGNOSIS — M85.89 OSTEOPENIA OF MULTIPLE SITES: Primary | ICD-10-CM

## 2024-01-04 PROCEDURE — 99205 OFFICE O/P NEW HI 60 MIN: CPT | Performed by: STUDENT IN AN ORGANIZED HEALTH CARE EDUCATION/TRAINING PROGRAM

## 2024-01-04 NOTE — PATIENT INSTRUCTIONS
Return Visit   [ x ] Physician in 4-5 weeks   [  ] In person or video visit  [  ] In person only    [ x ] After visit summary   [ x  ] Placed labs/imaging. Labs are to be drawn at 8A and fasting.   [  ] Central scheduling # for ultrasound/nuclear med/CT/MRI/DXA       It was great seeing you today!    Today we discussed your osteopenia:  -We reviewed your risk factors and your imaging which shows osteopenia  -We will obtain a secondary work up to look for other causes of bone loss  -The 2 labs that may not be covered are bone alkaline phosphatase and c-telopeptide   -We will discuss next steps at your follow up visit in 4-5 weeks     Take care!  -Dr. Conway
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
## 2024-01-08 ENCOUNTER — OFFICE VISIT (OUTPATIENT)
Dept: INTERNAL MEDICINE CLINIC | Facility: CLINIC | Age: 69
End: 2024-01-08
Payer: MEDICARE

## 2024-01-08 VITALS
SYSTOLIC BLOOD PRESSURE: 112 MMHG | OXYGEN SATURATION: 98 % | HEIGHT: 65 IN | TEMPERATURE: 98 F | HEART RATE: 79 BPM | DIASTOLIC BLOOD PRESSURE: 68 MMHG | WEIGHT: 118.81 LBS | BODY MASS INDEX: 19.79 KG/M2 | RESPIRATION RATE: 18 BRPM

## 2024-01-08 DIAGNOSIS — R92.1 BREAST CALCIFICATIONS: ICD-10-CM

## 2024-01-08 DIAGNOSIS — M85.89 OSTEOPENIA OF MULTIPLE SITES: ICD-10-CM

## 2024-01-08 DIAGNOSIS — R92.333 HETEROGENEOUSLY DENSE TISSUE OF BOTH BREASTS ON MAMMOGRAPHY: Primary | ICD-10-CM

## 2024-01-08 PROCEDURE — 99214 OFFICE O/P EST MOD 30 MIN: CPT | Performed by: INTERNAL MEDICINE
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
## 2024-01-10 ENCOUNTER — TELEPHONE (OUTPATIENT)
Facility: CLINIC | Age: 69
End: 2024-01-10

## 2024-01-10 NOTE — TELEPHONE ENCOUNTER
Pt phoning RN asking if the labs ordered when pt last saw Dr. Conway on 1/4/24 (and are due to be drawn) have a diagnosis code attached to them (for insurance purposes). RN looked up lab orders in [facility] and all labs ordered on 1/4/24 have the ICD code of M85.89. Pt made aware. Pt also would like to reschedule her 2/13/24 o.v. with Dr. Conway. Appointment rescheduled to 2/20/24. Pt made aware to have blood tests (ordered 1/4/24) drawn a week prior to seeing Dr. Conway in office.    Will close encounter.

## 2024-01-11 PROBLEM — Z51.81 ENCOUNTER FOR THERAPEUTIC DRUG LEVEL MONITORING: Status: ACTIVE | Noted: 2023-01-01

## 2024-01-11 PROBLEM — M54.12 RADICULOPATHY, CERVICAL REGION: Status: ACTIVE | Noted: 2023-01-01

## 2024-01-11 PROBLEM — D63.0 ANEMIA IN NEOPLASTIC DISEASE: Status: ACTIVE | Noted: 2023-01-01

## 2024-01-11 PROBLEM — Z86.73 PRSNL HX OF TIA (TIA), AND CEREB INFRC W/O RESID DEFICITS: Status: ACTIVE | Noted: 2023-01-01

## 2024-01-11 PROBLEM — F41.9 ANXIETY DISORDER, UNSPECIFIED: Status: ACTIVE | Noted: 2023-01-01

## 2024-01-11 PROBLEM — Z48.3 AFTERCARE FOLLOWING SURGERY FOR NEOPLASM: Status: ACTIVE | Noted: 2023-05-05

## 2024-01-11 PROBLEM — M48.061 SPINAL STENOSIS, LUMBAR REGION WITHOUT NEUROGENIC CLAUDICATION: Status: ACTIVE | Noted: 2023-01-01

## 2024-01-11 PROBLEM — M05.9 RHEUMATOID ARTHRITIS WITH RHEUMATOID FACTOR, UNSPECIFIED (HCC): Status: ACTIVE | Noted: 2023-01-01

## 2024-01-11 PROBLEM — I50.9 HEART FAILURE, UNSPECIFIED (HCC): Status: ACTIVE | Noted: 2023-01-01

## 2024-01-11 PROBLEM — E78.5 HYPERLIPIDEMIA, UNSPECIFIED: Status: ACTIVE | Noted: 2023-01-01

## 2024-01-11 PROBLEM — Z79.02 LONG TERM (CURRENT) USE OF ANTITHROMBOTICS/ANTIPLATELETS: Status: ACTIVE | Noted: 2023-01-01

## 2024-01-11 PROBLEM — M35.00 SJOGREN SYNDROME, UNSPECIFIED (HCC): Status: ACTIVE | Noted: 2023-01-01

## 2024-01-11 PROBLEM — E11.9 TYPE 2 DIABETES MELLITUS WITHOUT COMPLICATIONS (HCC): Status: ACTIVE | Noted: 2023-01-01

## 2024-01-11 PROBLEM — I11.0 HYPERTENSIVE HEART DISEASE WITH HEART FAILURE (HCC): Status: ACTIVE | Noted: 2023-05-04

## 2024-01-11 PROBLEM — M19.90 UNSPECIFIED OSTEOARTHRITIS, UNSPECIFIED SITE: Status: ACTIVE | Noted: 2023-01-01

## 2024-01-11 PROBLEM — F32.A DEPRESSION, UNSPECIFIED: Status: ACTIVE | Noted: 2023-01-01

## 2024-01-11 PROBLEM — C16.9: Status: ACTIVE | Noted: 2023-05-04

## 2024-01-11 PROBLEM — H54.7 UNSPECIFIED VISUAL LOSS: Status: ACTIVE | Noted: 2023-01-01

## 2024-02-12 ENCOUNTER — LAB ENCOUNTER (OUTPATIENT)
Dept: LAB | Age: 69
End: 2024-02-12
Attending: STUDENT IN AN ORGANIZED HEALTH CARE EDUCATION/TRAINING PROGRAM
Payer: MEDICARE

## 2024-02-12 DIAGNOSIS — M85.89 OSTEOPENIA OF MULTIPLE SITES: ICD-10-CM

## 2024-02-12 DIAGNOSIS — C49.A2 GASTROINTESTINAL STROMAL TUMOR (GIST) OF STOMACH (HCC): ICD-10-CM

## 2024-02-12 LAB
ALBUMIN SERPL-MCNC: 3.8 G/DL (ref 3.4–5)
ALBUMIN/GLOB SERPL: 1.1 {RATIO} (ref 1–2)
ALP LIVER SERPL-CCNC: 68 U/L
ALT SERPL-CCNC: 32 U/L
ANION GAP SERPL CALC-SCNC: 5 MMOL/L (ref 0–18)
AST SERPL-CCNC: 18 U/L (ref 15–37)
BILIRUB SERPL-MCNC: 0.5 MG/DL (ref 0.1–2)
BUN BLD-MCNC: 18 MG/DL (ref 9–23)
CALCIUM BLD-MCNC: 9.1 MG/DL (ref 8.5–10.1)
CHLORIDE SERPL-SCNC: 103 MMOL/L (ref 98–112)
CO2 SERPL-SCNC: 27 MMOL/L (ref 21–32)
CREAT BLD-MCNC: 0.68 MG/DL
EGFRCR SERPLBLD CKD-EPI 2021: 95 ML/MIN/1.73M2 (ref 60–?)
FASTING STATUS PATIENT QL REPORTED: YES
GLOBULIN PLAS-MCNC: 3.5 G/DL (ref 2.8–4.4)
GLUCOSE BLD-MCNC: 84 MG/DL (ref 70–99)
MAGNESIUM SERPL-MCNC: 2.1 MG/DL (ref 1.6–2.6)
OSMOLALITY SERPL CALC.SUM OF ELEC: 281 MOSM/KG (ref 275–295)
PHOSPHATE SERPL-MCNC: 3.6 MG/DL (ref 2.5–4.9)
POTASSIUM SERPL-SCNC: 3.8 MMOL/L (ref 3.5–5.1)
PROT SERPL-MCNC: 7.3 G/DL (ref 6.4–8.2)
PTH-INTACT SERPL-MCNC: 34 PG/ML (ref 18.5–88)
SODIUM SERPL-SCNC: 135 MMOL/L (ref 136–145)
VIT D+METAB SERPL-MCNC: 59.3 NG/ML (ref 30–100)

## 2024-02-12 PROCEDURE — 83970 ASSAY OF PARATHORMONE: CPT

## 2024-02-12 PROCEDURE — 83550 IRON BINDING TEST: CPT

## 2024-02-12 PROCEDURE — 80053 COMPREHEN METABOLIC PANEL: CPT

## 2024-02-12 PROCEDURE — 84100 ASSAY OF PHOSPHORUS: CPT

## 2024-02-12 PROCEDURE — 82523 COLLAGEN CROSSLINKS: CPT

## 2024-02-12 PROCEDURE — 36415 COLL VENOUS BLD VENIPUNCTURE: CPT

## 2024-02-12 PROCEDURE — 83735 ASSAY OF MAGNESIUM: CPT

## 2024-02-12 PROCEDURE — 83540 ASSAY OF IRON: CPT

## 2024-02-12 PROCEDURE — 82306 VITAMIN D 25 HYDROXY: CPT

## 2024-02-12 PROCEDURE — 82728 ASSAY OF FERRITIN: CPT
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
## 2024-02-15 ENCOUNTER — OFFICE VISIT (OUTPATIENT)
Dept: HEMATOLOGY/ONCOLOGY | Facility: HOSPITAL | Age: 69
End: 2024-02-15
Attending: INTERNAL MEDICINE
Payer: MEDICARE

## 2024-02-15 VITALS
DIASTOLIC BLOOD PRESSURE: 92 MMHG | WEIGHT: 118.5 LBS | HEART RATE: 77 BPM | BODY MASS INDEX: 19.74 KG/M2 | TEMPERATURE: 98 F | OXYGEN SATURATION: 96 % | RESPIRATION RATE: 16 BRPM | HEIGHT: 65 IN | SYSTOLIC BLOOD PRESSURE: 146 MMHG

## 2024-02-15 DIAGNOSIS — C49.A2 GASTROINTESTINAL STROMAL TUMOR (GIST) OF STOMACH (HCC): Primary | ICD-10-CM

## 2024-02-15 LAB
BASOPHILS # BLD AUTO: 0.03 X10(3) UL (ref 0–0.2)
BASOPHILS NFR BLD AUTO: 0.6 %
C-TELOPEPTIDE: 423 PG/ML
DEPRECATED HBV CORE AB SER IA-ACNC: 72.1 NG/ML
EOSINOPHIL # BLD AUTO: 0.02 X10(3) UL (ref 0–0.7)
EOSINOPHIL NFR BLD AUTO: 0.4 %
ERYTHROCYTE [DISTWIDTH] IN BLOOD BY AUTOMATED COUNT: 12.6 %
HCT VFR BLD AUTO: 40.4 %
HGB BLD-MCNC: 13.5 G/DL
IMM GRANULOCYTES # BLD AUTO: 0.01 X10(3) UL (ref 0–1)
IMM GRANULOCYTES NFR BLD: 0.2 %
IRON SATN MFR SERPL: 26 %
IRON SERPL-MCNC: 86 UG/DL
LYMPHOCYTES # BLD AUTO: 1.75 X10(3) UL (ref 1–4)
LYMPHOCYTES NFR BLD AUTO: 34 %
MCH RBC QN AUTO: 29.4 PG (ref 26–34)
MCHC RBC AUTO-ENTMCNC: 33.4 G/DL (ref 31–37)
MCV RBC AUTO: 88 FL
MONOCYTES # BLD AUTO: 0.28 X10(3) UL (ref 0.1–1)
MONOCYTES NFR BLD AUTO: 5.4 %
NEUTROPHILS # BLD AUTO: 3.05 X10 (3) UL (ref 1.5–7.7)
NEUTROPHILS # BLD AUTO: 3.05 X10(3) UL (ref 1.5–7.7)
NEUTROPHILS NFR BLD AUTO: 59.4 %
PLATELET # BLD AUTO: 198 10(3)UL (ref 150–450)
RBC # BLD AUTO: 4.59 X10(6)UL
TIBC SERPL-MCNC: 325 UG/DL (ref 240–450)
TRANSFERRIN SERPL-MCNC: 218 MG/DL (ref 200–360)
WBC # BLD AUTO: 5.1 X10(3) UL (ref 4–11)

## 2024-02-15 PROCEDURE — 99214 OFFICE O/P EST MOD 30 MIN: CPT | Performed by: INTERNAL MEDICINE

## 2024-02-15 RX ORDER — POLYETHYLENE GLYCOL 3350 17 G/17G
17 POWDER, FOR SOLUTION ORAL DAILY
COMMUNITY

## 2024-02-15 NOTE — PROGRESS NOTES
Patient here for follow-up. States she is having indigestion, feeling cold often, experiencing constipation, and would like to discuss concerns about osteoporosis.

## 2024-02-19 ENCOUNTER — TELEPHONE (OUTPATIENT)
Dept: INTERNAL MEDICINE CLINIC | Facility: CLINIC | Age: 69
End: 2024-02-19

## 2024-02-19 NOTE — TELEPHONE ENCOUNTER
Hurt leg (walking on stepped terrain) hurts 7-8/10.  This happened 3 weeks ago. High TE  scheduled far out.  Only wants AD or SD.    Future Appointments   Date Time Provider Department Center   3/8/2024  2:20 PM Orion Reynolds MD [facility]

## 2024-02-19 NOTE — TELEPHONE ENCOUNTER
Called and spoke to pt. Pt said she was in [place] 3 weeks ago and was walking the steep streets. The next day her knees were painful. She took ibuprofen and has rested, pain has improved. She can walk with no problems, but still having pain with stairs. She sees a  who recommended she look into PT. Offered appt with SD 2/22 for zita, pt very appreciative.     MELISSA PEREZ

## 2024-02-22 ENCOUNTER — TELEPHONE (OUTPATIENT)
Dept: INTERNAL MEDICINE CLINIC | Facility: CLINIC | Age: 69
End: 2024-02-22

## 2024-02-22 ENCOUNTER — OFFICE VISIT (OUTPATIENT)
Dept: INTERNAL MEDICINE CLINIC | Facility: CLINIC | Age: 69
End: 2024-02-22
Payer: MEDICARE

## 2024-02-22 VITALS
TEMPERATURE: 97 F | HEART RATE: 73 BPM | OXYGEN SATURATION: 97 % | WEIGHT: 119.19 LBS | DIASTOLIC BLOOD PRESSURE: 60 MMHG | SYSTOLIC BLOOD PRESSURE: 110 MMHG | BODY MASS INDEX: 20 KG/M2

## 2024-02-22 DIAGNOSIS — M25.561 ACUTE PAIN OF BOTH KNEES: Primary | ICD-10-CM

## 2024-02-22 DIAGNOSIS — M25.562 ACUTE PAIN OF BOTH KNEES: Primary | ICD-10-CM

## 2024-02-22 PROCEDURE — 99213 OFFICE O/P EST LOW 20 MIN: CPT | Performed by: NURSE PRACTITIONER

## 2024-02-22 RX ORDER — PREDNISONE 20 MG/1
TABLET ORAL
Qty: 15 TABLET | Refills: 0 | Status: SHIPPED | OUTPATIENT
Start: 2024-02-22 | End: 2024-02-22

## 2024-02-22 RX ORDER — PREDNISONE 20 MG/1
TABLET ORAL
Qty: 15 TABLET | Refills: 0 | Status: SHIPPED | OUTPATIENT
Start: 2024-02-22 | End: 2024-03-03
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
## 2024-02-22 NOTE — TELEPHONE ENCOUNTER
SD sent in rx for predniSONE 20 MG Oral Tab to [facility] but their system is down-can someone please call in rx ASAP as patient is in pain

## 2024-02-26 ENCOUNTER — HOSPITAL ENCOUNTER (OUTPATIENT)
Dept: MAMMOGRAPHY | Facility: HOSPITAL | Age: 69
Discharge: HOME | End: 2024-02-26
Attending: INTERNAL MEDICINE
Payer: MEDICARE

## 2024-02-26 DIAGNOSIS — R92.30 DENSE BREAST TISSUE: ICD-10-CM

## 2024-02-26 DIAGNOSIS — R92.8 ABNORMAL MAMMOGRAM: ICD-10-CM

## 2024-02-26 PROCEDURE — 76641 ULTRASOUND BREAST COMPLETE: CPT | Performed by: INTERNAL MEDICINE

## 2024-02-27 ENCOUNTER — TELEPHONE (OUTPATIENT)
Dept: INTERNAL MEDICINE CLINIC | Facility: CLINIC | Age: 69
End: 2024-02-27

## 2024-02-27 NOTE — TELEPHONE ENCOUNTER
Pt is asking if she needs to get her routine labs done yet. She stated that she normally gets an A1C and a thyroid completed

## 2024-02-27 NOTE — TELEPHONE ENCOUNTER
No blood work needed at this time. No history of diabetes; has prediabetes. UTD with bloodwork. Can do HbA1c in office on 3/8.

## 2024-02-29 ENCOUNTER — OFFICE VISIT (OUTPATIENT)
Dept: PODIATRY CLINIC | Facility: CLINIC | Age: 69
End: 2024-02-29

## 2024-02-29 DIAGNOSIS — M20.41 HAMMER TOE OF RIGHT FOOT: ICD-10-CM

## 2024-02-29 DIAGNOSIS — B35.1 ONYCHOMYCOSIS: ICD-10-CM

## 2024-02-29 DIAGNOSIS — M48.061 LUMBAR FORAMINAL STENOSIS: ICD-10-CM

## 2024-02-29 DIAGNOSIS — L60.3 NAIL DYSTROPHY: Primary | ICD-10-CM

## 2024-02-29 DIAGNOSIS — E11.9 TYPE 2 DIABETES MELLITUS WITHOUT COMPLICATION, UNSPECIFIED WHETHER LONG TERM INSULIN USE (HCC): ICD-10-CM

## 2024-02-29 PROCEDURE — 99213 OFFICE O/P EST LOW 20 MIN: CPT | Performed by: STUDENT IN AN ORGANIZED HEALTH CARE EDUCATION/TRAINING PROGRAM
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
## 2024-03-05 ENCOUNTER — LAB ENCOUNTER (OUTPATIENT)
Dept: LAB | Age: 69
End: 2024-03-05
Attending: STUDENT IN AN ORGANIZED HEALTH CARE EDUCATION/TRAINING PROGRAM
Payer: MEDICARE

## 2024-03-05 DIAGNOSIS — M85.89 OSTEOPENIA OF MULTIPLE SITES: ICD-10-CM

## 2024-03-05 PROCEDURE — 84080 ASSAY ALKALINE PHOSPHATASES: CPT

## 2024-03-05 PROCEDURE — 36415 COLL VENOUS BLD VENIPUNCTURE: CPT

## 2024-03-08 LAB — ALKALINE PHOSPHATASE BONE SPECIFIC: 16.6 UG/L

## 2024-03-14 ENCOUNTER — OFFICE VISIT (OUTPATIENT)
Facility: CLINIC | Age: 69
End: 2024-03-14
Payer: MEDICARE

## 2024-03-14 VITALS — DIASTOLIC BLOOD PRESSURE: 68 MMHG | OXYGEN SATURATION: 96 % | HEART RATE: 82 BPM | SYSTOLIC BLOOD PRESSURE: 118 MMHG

## 2024-03-14 DIAGNOSIS — E03.8 SUBCLINICAL HYPOTHYROIDISM: ICD-10-CM

## 2024-03-14 DIAGNOSIS — M85.89 OSTEOPENIA OF MULTIPLE SITES: Primary | ICD-10-CM

## 2024-03-14 PROCEDURE — 99215 OFFICE O/P EST HI 40 MIN: CPT | Performed by: STUDENT IN AN ORGANIZED HEALTH CARE EDUCATION/TRAINING PROGRAM
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
## 2024-03-25 ENCOUNTER — OFFICE VISIT (OUTPATIENT)
Dept: PHYSICAL THERAPY | Facility: HOSPITAL | Age: 69
End: 2024-03-25
Attending: NURSE PRACTITIONER
Payer: MEDICARE

## 2024-03-25 DIAGNOSIS — M25.561 ACUTE PAIN OF BOTH KNEES: Primary | ICD-10-CM

## 2024-03-25 DIAGNOSIS — M25.562 ACUTE PAIN OF BOTH KNEES: Primary | ICD-10-CM

## 2024-03-25 PROCEDURE — 97110 THERAPEUTIC EXERCISES: CPT

## 2024-03-25 PROCEDURE — 97161 PT EVAL LOW COMPLEX 20 MIN: CPT
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
## 2024-03-27 ENCOUNTER — APPOINTMENT (OUTPATIENT)
Dept: PHYSICAL THERAPY | Facility: HOSPITAL | Age: 69
End: 2024-03-27
Attending: NURSE PRACTITIONER
Payer: MEDICARE

## 2024-03-29 ENCOUNTER — OFFICE VISIT (OUTPATIENT)
Dept: PHYSICAL THERAPY | Facility: HOSPITAL | Age: 69
End: 2024-03-29
Attending: NURSE PRACTITIONER
Payer: MEDICARE

## 2024-03-29 PROCEDURE — 97110 THERAPEUTIC EXERCISES: CPT

## 2024-03-29 PROCEDURE — 97140 MANUAL THERAPY 1/> REGIONS: CPT

## 2024-03-29 NOTE — PROGRESS NOTES
Diagnosis:   Acute pain of both knees (M25.561,M25.562)      Referring Provider: Ely Busby  Date of Evaluation:    3/25/2024    Precautions:  Cancer Next MD visit:   none scheduled  Date of Surgery: n/a   Insurance Primary/Secondary: MEDICARE / [employer] [place] INDEMNITY     # Auth Visits: 10 per POC            Subjective: Both knees feel the same. No changes.    Pain: 7/10      Objective:   B hamstrings shortness  Tenderness with increased tone B medial hamstrings L > R  Hamstring stretch: provocation of medial knee pain at the end-range      Assessment: B hamstring shortness and stiffness; better tolerance to hamstring stretching following STM. Bridges are challenging with reduced range and quick onset of fatigue. Applied KT to both knees for proprioceptive feedback. Form corrected with TE to avoid PPT.       Goals:   Goals: (to be met in 10 visits)   Pt will improve hamstrings length to normal to facilitate reduced pain and improved knee biomechanics  Pt will improve  glut max strength to 4+/5 B to facilitate reduced difficulty with going up hills and getting up from the floor  Pt will improve quad strength to 5/5 to ascend 1 flight of stairs reciprocally without UE assist   Pt will increase hip and knee strength to grossly 4+/5 to be able to get up and down from the floor safely   Pt will demonstrate increased hip ER/ABD strength to 5-/5 to perform stepping and squatting activities without excessive femoral IR/ADD   Pt will improve SLS to 30s to improve safety with gait on uneven surfaces such as grass and gravel  Pt will be independent and compliant with comprehensive HEP to maintain progress achieved in PT     Plan: STM as needed; assess response to taping; hamstring stretching; glut/quad strengthening  Date: 3/29/2024  TX#: 2/10 Date:                 TX#: 3/ Date:                 TX#: 4/ Date:                 TX#: 5/ Date:   Tx#: 6/   TE: 30  NU step: L4 5' (pain on the extension; better with seat moved in)        SB rolls DKTC  SB rolls LTR  Ankle pumps with feet over SB       Bridge with SB x 10  Bridge x 10  Clams x 20 L/R  Hkly TrA with marching x 15 L/R  ADD ball squeezes  ADD ball squeeze with march       Manual: 15  STM B medial hamstrings and hip adductors  Manual hamstring stretch B  KT application for proprioceptive feedback B       HEP: issued at [employer]     Charges: TE x 2, man x 1       Total Timed Treatment: 45 min  Total Treatment Time: 45 min

## 2024-04-02 ENCOUNTER — APPOINTMENT (OUTPATIENT)
Dept: PHYSICAL THERAPY | Facility: HOSPITAL | Age: 69
End: 2024-04-02
Attending: NURSE PRACTITIONER
Payer: MEDICARE

## 2024-04-02 PROCEDURE — 97110 THERAPEUTIC EXERCISES: CPT

## 2024-04-02 PROCEDURE — 97140 MANUAL THERAPY 1/> REGIONS: CPT

## 2024-04-02 NOTE — PROGRESS NOTES
Diagnosis:   Acute pain of both knees (M25.561,M25.562)      Referring Provider: Ely Busby  Date of Evaluation:    3/25/2024    Precautions:  Cancer Next MD visit:   none scheduled  Date of Surgery: n/a   Insurance Primary/Secondary: MEDICARE / [place] INDEMN[place]     # Auth Visits: 10 per POC            Subjective: Knees feels a little better and she has been walking better as well. She like the KT tape on her knees.     Pain: 6/10      Objective:   90-90 Hamstring: L: lacking 28 deg (moderate restrictions); R: 18 deg (mild restrictions)  Tenderness with increased tone B medial hamstrings L > R  Hamstring stretch: provocation of medial knee pain at the end-range      Assessment: Making good improvement with hamstring length and soft tissue mobility; L limited more than R. Continued with KT application to both knees for proprioceptive feedback. Initiated weight-bearing exercises with good tolerance.       Goals:   Goals: (to be met in 10 visits)   Pt will improve hamstrings length to normal to facilitate reduced pain and improved knee biomechanics  Pt will improve  glut max strength to 4+/5 B to facilitate reduced difficulty with going up hills and getting up from the floor  Pt will improve quad strength to 5/5 to ascend 1 flight of stairs reciprocally without UE assist   Pt will increase hip and knee strength to grossly 4+/5 to be able to get up and down from the floor safely   Pt will demonstrate increased hip ER/ABD strength to 5-/5 to perform stepping and squatting activities without excessive femoral IR/ADD   Pt will improve SLS to 30s to improve safety with gait on uneven surfaces such as grass and gravel  Pt will be independent and compliant with comprehensive HEP to maintain progress achieved in PT     Plan: add leg press; STM and taping as needed; hamstring stretching; glut/quad strengthening  Date: 3/29/2024  TX#: 2/10 Date:  4/2/2024               TX#: 3/10 Date:                 TX#: 4/ Date:                  TX#: 5/ Date:   Tx#: 6/   TE: 30  NU step: L4 5' (pain on the extension; better with seat moved in) TE: 30  NU step: L4 5', no pain on the knee extension today      SB rolls DKTC  SB rolls LTR  Ankle pumps with feet over SB SB rolls DKTC  SB rolls LTR  Ankle pumps with feet over SB      Bridge with SB x 10  Bridge x 10  Clams x 20 L/R  Hkly TrA with marching x 15 L/R  ADD ball squeezes  ADD ball squeeze with march Bridge with SB 2 x 10   ADD ball squeezes 10 x 5 sec holds  Bridge with ADD ball x 15  T board calf stretch  T board A/P rocking  Red TB lateral steps x 20 L/R  DLS on Airex with cone taps x 10 L/R      Manual: 15  STM B medial hamstrings and hip adductors  Manual hamstring stretch B  KT application for proprioceptive feedback B Manual: 10'  STM B medial hamstrings and hip adductors  Manual hamstring stretch B  KT application for proprioceptive feedback B      HEP: issued at [facility]     Charges: TE x 2, man x 1       Total Timed Treatment: 45 min  Total Treatment Time: 45 min

## 2024-04-05 ENCOUNTER — OFFICE VISIT (OUTPATIENT)
Dept: PHYSICAL THERAPY | Facility: HOSPITAL | Age: 69
End: 2024-04-05
Attending: NURSE PRACTITIONER
Payer: MEDICARE

## 2024-04-05 PROCEDURE — 97140 MANUAL THERAPY 1/> REGIONS: CPT

## 2024-04-05 PROCEDURE — 97110 THERAPEUTIC EXERCISES: CPT

## 2024-04-05 NOTE — PROGRESS NOTES
Diagnosis:   Acute pain of both knees (M25.561,M25.562)      Referring Provider: Ely Busby  Date of Evaluation:    3/25/2024    Precautions:  Cancer Next MD visit:   none scheduled  Date of Surgery: n/a   Insurance Primary/Secondary: MEDIC[place] / [place]DEMN[place]     # Auth Visits: 10 per POC            Subjective: Knees feel better; pain is less intense. Much better with walking; still limited with stairs. Tape helps a lot. Yesterday she was able to walk faster for the first time in 2 months.    Pain: 5/10 with walking; 6-7/10 with stairs      Objective:   90-90 Hamstring: L: lacking 28 deg (moderate restrictions); R: 18 deg (mild restrictions)  Tenderness with increased tone B medial hamstrings L > R  Hamstring stretch: provocation of medial knee pain at the end-range      Assessment: Added leg press with good tolerance. Making good improvement with hamstring length and soft tissue mobility; still with more limitations on L than R. Continued with KT application to both knees for proprioceptive feedback. Sways back, hyperextends her knees and drops pelvis with either SLB; form corrected to keep knee \"soft\", engage core and bring chest/shoulders forward. Able to maintain SLB for about 20 sec but finds it very difficult. Patient continues to require skilled physical therapy for further progress with flexibility, strength and functional mobility.       Goals:   Goals: (to be met in 10 visits)   Pt will improve hamstrings length to normal to facilitate reduced pain and improved knee biomechanics  Pt will improve  glut max strength to 4+/5 B to facilitate reduced difficulty with going up hills and getting up from the floor  Pt will improve quad strength to 5/5 to ascend 1 flight of stairs reciprocally without UE assist   Pt will increase hip and knee strength to grossly 4+/5 to be able to get up and down from the floor safely   Pt will demonstrate increased hip ER/ABD strength to 5-/5 to perform stepping and squatting  activities without excessive femoral IR/ADD   Pt will improve SLS to 30s to improve safety with gait on uneven surfaces such as grass and gravel  Pt will be independent and compliant with comprehensive HEP to maintain progress achieved in PT     Plan: add leg press; STM and taping as needed; hamstring stretching; glut/quad strengthening  Date: 3/29/2024  TX#: 2/10 Date:  4/2/2024               TX#: 3/10 Date:  4/5/2024               TX#: 4/10 Date:                 TX#: 5/ Date:   Tx#: 6/   TE: 30  NU step: L4 5' (pain on the extension; better with seat moved in) TE: 30  NU step: L4 5', no pain on the knee extension today TE: 30  NU step: L5 5', no pain on the knee extension today     SB rolls DKTC  SB rolls LTR  Ankle pumps with feet over SB SB rolls DKTC  SB rolls LTR  Ankle pumps with feet over SB SB rolls DKTC  SLR 1# with hip ER x 12 L/R     Bridge with SB x 10  Bridge x 10  Clams x 20 L/R  Hkly TrA with marching x 15 L/R  ADD ball squeezes  ADD ball squeeze with march Bridge with SB 2 x 10   ADD ball squeezes 10 x 5 sec holds  Bridge with ADD ball x 15  T board calf stretch  T board A/P rocking  Red TB lateral steps x 20 L/R  DLS on Airex with cone taps x 10 L/R Bridge with SB 2 x 10   Red TB lateral steps x 20 L/R  SLB: form correction  Double leg press 1 black and 1 yellow cords: x 30 reps; mild L knee pain with knee flexion  Single leg press 1 grey and 1 yellow cords: x 20 R  1 grey cord x 20, L     Manual: 15  STM B medial hamstrings and hip adductors  Manual hamstring stretch B  KT application for proprioceptive feedback B Manual: 10'  STM B medial hamstrings and hip adductors  Manual hamstring stretch B  KT application for proprioceptive feedback B Manual: 10'  STM B medial hamstrings and hip adductors  Manual hamstring stretch B  KT application for proprioceptive feedback B     HEP: issued at [employer]     Charges: TE x 2, man x 1       Total Timed Treatment: 45 min  Total Treatment Time: 45 min

## 2024-04-09 ENCOUNTER — OFFICE VISIT (OUTPATIENT)
Dept: PHYSICAL THERAPY | Facility: HOSPITAL | Age: 69
End: 2024-04-09
Attending: NURSE PRACTITIONER
Payer: MEDICARE

## 2024-04-09 PROCEDURE — 97110 THERAPEUTIC EXERCISES: CPT

## 2024-04-09 PROCEDURE — 97140 MANUAL THERAPY 1/> REGIONS: CPT

## 2024-04-09 NOTE — PROGRESS NOTES
Diagnosis:   Acute pain of both knees (M25.561,M25.562)      Referring Provider: Ely Busby  Date of Evaluation:    3/25/2024    Precautions:  Cancer Next MD visit:   none scheduled  Date of Surgery: n/a   Insurance Primary/Secondary: MEDICARE / [place]DEMN[place]     # Auth Visits: 10 per POC            Subjective: Knees feel better; pain is less intense. R side is doing better than L. Continued with difficulty with stairs, getting out of the car, getting up from sofa.    Pain: 3-4/10 with walking; 4/10 with stairs for R; 5/10 with stairs for L      Objective:   90-90 Hamstring: L: lacking 28 deg (moderate restrictions); R: 18 deg (mild restrictions)  Tenderness with increased tone B medial hamstrings L > R  Hamstring stretch: provocation of medial knee pain at the end-range      Assessment: Challenged by weighted SLR's with hip ER (1.5#). Continued with leg press; able to progress resistance with double and right leg press; but on the left tolerated the same load as last time (about 50% of the right). Making good improvement with hamstring length and soft tissue mobility; still with more limitations on L than R. Continued with KT application to both knees for proprioceptive feedback. Patient continues to require skilled physical therapy for further progress with flexibility, strength and functional mobility. Patient will schedule 5 more appointments and will be placed on the waitlist to resume PT sooner.      Goals:   Goals: (to be met in 10 visits)   Pt will improve hamstrings length to normal to facilitate reduced pain and improved knee biomechanics  Pt will improve  glut max strength to 4+/5 B to facilitate reduced difficulty with going up hills and getting up from the floor  Pt will improve quad strength to 5/5 to ascend 1 flight of stairs reciprocally without UE assist   Pt will increase hip and knee strength to grossly 4+/5 to be able to get up and down from the floor safely   Pt will demonstrate increased  hip ER/ABD strength to 5-/5 to perform stepping and squatting activities without excessive femoral IR/ADD   Pt will improve SLS to 30s to improve safety with gait on uneven surfaces such as grass and gravel  Pt will be independent and compliant with comprehensive HEP to maintain progress achieved in PT     Plan: leg press; STM and taping as needed; hamstring stretching; glut/quad strengthening  Date: 3/29/2024  TX#: 2/10 Date:  4/2/2024               TX#: 3/10 Date:  4/5/2024               TX#: 4/10 Date: 4/9/2024                TX#: 5/10 Date:   Tx#: 6/   TE: 30  NU step: L4 5' (pain on the extension; better with seat moved in) TE: 30  NU step: L4 5', no pain on the knee extension today TE: 30  NU step: L5 5', no pain on the knee extension today TE: 30  NU step: L6 5', no pain on the knee extension today    SB rolls DKTC  SB rolls LTR  Ankle pumps with feet over SB SB rolls DKTC  SB rolls LTR  Ankle pumps with feet over SB SB rolls DKTC  SLR 1# with hip ER x 12 L/R SLR 1.5# with hip ER x 12 L/R \"very hard\"  Bridge with SB 2 x 10     Bridge with SB x 10  Bridge x 10  Clams x 20 L/R  Hkly TrA with marching x 15 L/R  ADD ball squeezes  ADD ball squeeze with march Bridge with SB 2 x 10   ADD ball squeezes 10 x 5 sec holds  Bridge with ADD ball x 15  T board calf stretch  T board A/P rocking  Red TB lateral steps x 20 L/R  DLS on Airex with cone taps x 10 L/R Bridge with SB 2 x 10   Red TB lateral steps x 20 L/R  SLB: form correction  Double leg press 1 black and 1 yellow cords: x 30 reps; mild L knee pain with knee flexion  Single leg press 1 grey and 1 yellow cords: x 20 R  1 grey cord x 20, L Red TB lateral steps x 20 L/R  SLB: form correction  Double leg press 1 black and 1 grey cords: x 30 reps; mild L knee pain with knee flexion  Single leg press 1 black cord: x 20 R  1 grey cord x 20, L    Manual: 15  STM B medial hamstrings and hip adductors  Manual hamstring stretch B  KT application for proprioceptive feedback  B Manual: 10'  STM B medial hamstrings and hip adductors  Manual hamstring stretch B  KT application for proprioceptive feedback B Manual: 10'  STM B medial hamstrings and hip adductors  Manual hamstring stretch B  KT application for proprioceptive feedback B Manual: 10'  STM B medial hamstrings and hip adductors  Manual hamstring stretch B  KT application for proprioceptive feedback B    HEP: issued at [employer]     Charges: TE x 2, man x 1       Total Timed Treatment: 45 min  Total Treatment Time: 45 min

## 2024-05-07 ENCOUNTER — TELEPHONE (OUTPATIENT)
Dept: INTERNAL MEDICINE CLINIC | Facility: CLINIC | Age: 69
End: 2024-05-07

## 2024-05-07 ENCOUNTER — OFFICE VISIT (OUTPATIENT)
Dept: INTERNAL MEDICINE CLINIC | Facility: CLINIC | Age: 69
End: 2024-05-07
Payer: MEDICARE

## 2024-05-07 VITALS
BODY MASS INDEX: 20.16 KG/M2 | TEMPERATURE: 98 F | HEART RATE: 71 BPM | RESPIRATION RATE: 18 BRPM | WEIGHT: 121 LBS | OXYGEN SATURATION: 98 % | DIASTOLIC BLOOD PRESSURE: 64 MMHG | SYSTOLIC BLOOD PRESSURE: 112 MMHG | HEIGHT: 65 IN

## 2024-05-07 DIAGNOSIS — K59.01 CONSTIPATION, SLOW TRANSIT: ICD-10-CM

## 2024-05-07 DIAGNOSIS — E03.8 SUBCLINICAL HYPOTHYROIDISM: Primary | ICD-10-CM

## 2024-05-07 DIAGNOSIS — L03.113 CELLULITIS OF HAND, RIGHT: ICD-10-CM

## 2024-05-07 DIAGNOSIS — R73.03 PREDIABETES: ICD-10-CM

## 2024-05-07 PROCEDURE — 99214 OFFICE O/P EST MOD 30 MIN: CPT | Performed by: INTERNAL MEDICINE

## 2024-05-07 PROCEDURE — G2211 COMPLEX E/M VISIT ADD ON: HCPCS | Performed by: INTERNAL MEDICINE

## 2024-05-07 RX ORDER — DOXYCYCLINE HYCLATE 100 MG/1
100 CAPSULE ORAL 2 TIMES DAILY
Qty: 10 CAPSULE | Refills: 0 | Status: SHIPPED | OUTPATIENT
Start: 2024-05-07
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
## 2024-05-07 NOTE — TELEPHONE ENCOUNTER
Patient wanting to know if you have any primary care recommendations she can give her daughter who lives in [facility]?

## 2024-05-09 ENCOUNTER — OFFICE VISIT (OUTPATIENT)
Dept: PODIATRY CLINIC | Facility: CLINIC | Age: 69
End: 2024-05-09

## 2024-05-09 DIAGNOSIS — M48.061 LUMBAR FORAMINAL STENOSIS: ICD-10-CM

## 2024-05-09 DIAGNOSIS — E11.9 TYPE 2 DIABETES MELLITUS WITHOUT COMPLICATION, UNSPECIFIED WHETHER LONG TERM INSULIN USE (HCC): ICD-10-CM

## 2024-05-09 DIAGNOSIS — M20.41 HAMMER TOE OF RIGHT FOOT: ICD-10-CM

## 2024-05-09 DIAGNOSIS — L60.3 NAIL DYSTROPHY: ICD-10-CM

## 2024-05-09 DIAGNOSIS — B35.1 ONYCHOMYCOSIS: Primary | ICD-10-CM

## 2024-05-09 PROCEDURE — 99213 OFFICE O/P EST LOW 20 MIN: CPT | Performed by: STUDENT IN AN ORGANIZED HEALTH CARE EDUCATION/TRAINING PROGRAM
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
## 2024-05-13 ENCOUNTER — OFFICE VISIT (OUTPATIENT)
Dept: PHYSICAL THERAPY | Facility: HOSPITAL | Age: 69
End: 2024-05-13
Attending: INTERNAL MEDICINE
Payer: MEDICARE

## 2024-05-13 PROCEDURE — 97110 THERAPEUTIC EXERCISES: CPT

## 2024-05-13 PROCEDURE — 97140 MANUAL THERAPY 1/> REGIONS: CPT

## 2024-05-13 NOTE — PROGRESS NOTES
Diagnosis:   Acute pain of both knees (M25.561,M25.562)      Referring Provider: No ref. provider found  Date of Evaluation:    3/25/2024    Precautions:  Cancer Next MD visit:   none scheduled  Date of Surgery: n/a   Insurance Primary/Secondary: MEDICARE / [employer] [place] INDEMNITY     # Auth Visits: 10 per POC            Subjective: States that her pain has slightly worsened over the past month while she was waiting to resume PT. Reports doing well with walking but increased pain by about one point with going down the stairs, getting up from the chair, in/out of car.     Pain: 3-4/10 with walking; 7-8/10 with going down the stairs      Objective:   90-90 Hamstring: L: lacking 20 deg but stiff(mild to moderate restrictions); R: 15 deg (mild restrictions)  Tenderness with increased tone B medial hamstrings L > R (improved)  Knee ROM: Full and pain-free B      Assessment: Patient returns to physical therapy after one month during which she continued with HEP. States that knee pain increased by about one point with going down the stairs, getting up from the chair, and squats. She maintains full and pain-free ROM of both knees, hamstrings flexibility improved to mild restrictions only. Challenged with posterior chain strengthening  and quadriceps strengthening with emphasis on VMO. Continued with double leg press at the same resistance but modified range to 0-60 due to anterior knee pain with deeper knee flexion. She was able to complete single leg press in bigger range with the same resistance as last session; she pressed 50% of the weight with L LE as compared to R side. Patient continues to require skilled physical therapy for further progress with flexibility, strength and functional mobility. Patient will schedule 5 more appointments and will be placed on the waitlist to resume PT sooner.      Goals:   Goals: (to be met in 10 visits)   Pt will improve hamstrings length to normal to facilitate reduced pain and improved  knee biomechanics  Pt will improve  glut max strength to 4+/5 B to facilitate reduced difficulty with going up hills and getting up from the floor  Pt will improve quad strength to 5/5 to ascend 1 flight of stairs reciprocally without UE assist   Pt will increase hip and knee strength to grossly 4+/5 to be able to get up and down from the floor safely   Pt will demonstrate increased hip ER/ABD strength to 5-/5 to perform stepping and squatting activities without excessive femoral IR/ADD   Pt will improve SLS to 30s to improve safety with gait on uneven surfaces such as grass and gravel  Pt will be independent and compliant with comprehensive HEP to maintain progress achieved in PT     Plan: leg press; STM and taping as needed; hamstring stretching; glut/quad strengthening  Date: 3/29/2024  TX#: 2/10 Date:  4/2/2024               TX#: 3/10 Date:  4/5/2024               TX#: 4/10 Date: 4/9/2024                TX#: 5/10 Date: 5/13/2024  Tx#: 6/10   TE: 30  NU step: L4 5' (pain on the extension; better with seat moved in) TE: 30  NU step: L4 5', no pain on the knee extension today TE: 30  NU step: L5 5', no pain on the knee extension today TE: 30  NU step: L6 5', no pain on the knee extension today TE: 35  NU step: L6 5', no pain on the knee extension today   SB rolls DKTC  SB rolls LTR  Ankle pumps with feet over SB SB rolls DKTC  SB rolls LTR  Ankle pumps with feet over SB SB rolls DKTC  SLR 1# with hip ER x 12 L/R SLR 1.5# with hip ER x 12 L/R \"very hard\"  Bridge with SB 2 x 10  SLR 1.5# with hip ER x 12 L/R \"very hard\"  Bridge with SB 2 x 10   Bridge with march 2 x 5 L/R  Quadruped 10 x 5 sec hip extensions   Bridge with SB x 10  Bridge x 10  Clams x 20 L/R  Hkly TrA with marching x 15 L/R  ADD ball squeezes  ADD ball squeeze with march Bridge with SB 2 x 10   ADD ball squeezes 10 x 5 sec holds  Bridge with ADD ball x 15  T board calf stretch  T board A/P rocking  Red TB lateral steps x 20 L/R  DLS on Airex with  cone taps x 10 L/R Bridge with SB 2 x 10   Red TB lateral steps x 20 L/R  SLB: form correction  Double leg press 1 black and 1 yellow cords: x 30 reps; mild L knee pain with knee flexion  Single leg press 1 grey and 1 yellow cords: x 20 R  1 grey cord x 20, L Red TB lateral steps x 20 L/R  SLB: form correction  Double leg press 1 black and 1 grey cords: x 30 reps; mild L knee pain with knee flexion  Single leg press 1 black cord: x 20 R  1 grey cord x 20, L Red TB lateral steps x 20 L/R  Double leg press 1 black and 1 grey cords: x 30 reps; partial range 60-0  Single leg press 1 black cord: x 20 R  1 grey cord x 20, L  HEP update   Manual: 15  STM B medial hamstrings and hip adductors  Manual hamstring stretch B  KT application for proprioceptive feedback B Manual: 10'  STM B medial hamstrings and hip adductors  Manual hamstring stretch B  KT application for proprioceptive feedback B Manual: 10'  STM B medial hamstrings and hip adductors  Manual hamstring stretch B  KT application for proprioceptive feedback B Manual: 10'  STM B medial hamstrings and hip adductors  Manual hamstring stretch B  KT application for proprioceptive feedback B Manual: 8'  STM B medial hamstrings and hip adductors  Manual hamstring stretch B   HEP: issued at [facility]   Access Code: [email or web address]  URL: [email or web address]  Date: 05/13/2024  Prepared by: Krystal Goldman    Exercises  - Supine Bridge  - 1 x daily - 7 x weekly - 2 sets - 10 reps  - Marching Bridge  - 1 x daily - 7 x weekly - 3 sets - 10 reps  - Straight Leg Raise with External Rotation  - 1 x daily - 7 x weekly - 3 sets - 10 reps  - Clamshell with Resistance  - 1 x daily - 7 x weekly - 2 sets - 10 reps  - Beginner Front Arm Support  - 1 x daily - 7 x weekly - 3 sets - 10 reps  - Single Leg Balance in March Position  - 1 x daily - 7 x weekly - 1 sets - 3 reps - 20 sec hold  - Side Stepping with Resistance at Ankles  - 1 x daily - 7 x weekly - 3 sets - 10 reps  - Heel Raises  with Counter Support  - 1 x daily - 7 x weekly - 3 sets - 10 reps    Charges: TE x 2, man x 1       Total Timed Treatment: 43 min  Total Treatment Time: 43 min

## 2024-05-14 ENCOUNTER — HOSPITAL ENCOUNTER (OUTPATIENT)
Dept: CT IMAGING | Facility: HOSPITAL | Age: 69
Discharge: HOME | End: 2024-05-14
Attending: INTERNAL MEDICINE

## 2024-05-14 DIAGNOSIS — C49.A2 GASTROINTESTINAL STROMAL TUMOR (GIST) OF STOMACH (HCC): ICD-10-CM

## 2024-05-14 LAB
CREAT BLD-MCNC: 0.5 MG/DL
EGFRCR SERPLBLD CKD-EPI 2021: 101 ML/MIN/1.73M2 (ref 60–?)

## 2024-05-14 PROCEDURE — 82565 ASSAY OF CREATININE: CPT

## 2024-05-14 PROCEDURE — 71260 CT THORAX DX C+: CPT | Performed by: INTERNAL MEDICINE

## 2024-05-14 PROCEDURE — 74177 CT ABD & PELVIS W/CONTRAST: CPT | Performed by: INTERNAL MEDICINE

## 2024-05-18 ENCOUNTER — OFFICE VISIT (OUTPATIENT)
Dept: INTERNAL MEDICINE CLINIC | Facility: CLINIC | Age: 69
End: 2024-05-18

## 2024-05-18 VITALS
TEMPERATURE: 97 F | SYSTOLIC BLOOD PRESSURE: 100 MMHG | DIASTOLIC BLOOD PRESSURE: 58 MMHG | OXYGEN SATURATION: 97 % | HEART RATE: 66 BPM | BODY MASS INDEX: 20 KG/M2 | WEIGHT: 121 LBS

## 2024-05-18 DIAGNOSIS — D25.9 UTERINE LEIOMYOMA, UNSPECIFIED LOCATION: ICD-10-CM

## 2024-05-18 DIAGNOSIS — M79.644 PAIN IN RIGHT FINGER(S): Primary | ICD-10-CM

## 2024-05-18 DIAGNOSIS — C49.A2 GASTROINTESTINAL STROMAL TUMOR (GIST) OF STOMACH (HCC): ICD-10-CM

## 2024-05-18 DIAGNOSIS — R91.1 NODULE OF LEFT LUNG: ICD-10-CM

## 2024-05-18 DIAGNOSIS — K76.89 HEPATIC CYST: ICD-10-CM

## 2024-05-18 PROCEDURE — 99214 OFFICE O/P EST MOD 30 MIN: CPT | Performed by: INTERNAL MEDICINE
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
## 2024-05-20 ENCOUNTER — HOSPITAL ENCOUNTER (OUTPATIENT)
Dept: GENERAL RADIOLOGY | Facility: HOSPITAL | Age: 69
Discharge: HOME | End: 2024-05-20
Attending: INTERNAL MEDICINE

## 2024-05-20 ENCOUNTER — OFFICE VISIT (OUTPATIENT)
Dept: PHYSICAL THERAPY | Facility: HOSPITAL | Age: 69
End: 2024-05-20
Attending: INTERNAL MEDICINE

## 2024-05-20 DIAGNOSIS — M79.644 PAIN IN RIGHT FINGER(S): ICD-10-CM

## 2024-05-20 PROCEDURE — 97140 MANUAL THERAPY 1/> REGIONS: CPT

## 2024-05-20 PROCEDURE — 73130 X-RAY EXAM OF HAND: CPT | Performed by: INTERNAL MEDICINE

## 2024-05-20 PROCEDURE — 97110 THERAPEUTIC EXERCISES: CPT

## 2024-05-20 NOTE — PROGRESS NOTES
Diagnosis:   Acute pain of both knees (M25.561,M25.562)      Referring Provider: No ref. provider found  Date of Evaluation:    3/25/2024    Precautions:  Cancer Next MD visit:   none scheduled  Date of Surgery: n/a   Insurance Primary/Secondary: MEDICARE / [employer] [place] INDEMNITY     # Auth Visits: 10 per POC            Subjective: HEP: Single leg balance and Beginner front arm support was difficult. Feels better with walking; still difficulty with stairs but feels like it is getting better. . L knee is more painful than right.     Pain: 3-4/10 with walking; 6-7/10 with going down the stairs      Objective:   90-90 Hamstring: L: lacking 20 deg but stiff(mild to moderate restrictions); R: 15 deg (mild restrictions)  Tenderness with increased tone B medial hamstrings L > R (improved)  Knee ROM: Full and pain-free B      Assessment: Full and pain-free ROM of both knees, hamstrings flexibility improved to mild restrictions only but still stiff. Continued with posterior chain strengthening  and with quadriceps strengthening with emphasis on VMO. She was able to complete left single leg press in 45-90 range with the increased resistance, equal to R side today. Reviewed positioning with SLB to avoid swayback posture; recommended to practice with light support of one hand for mm endurance. Patient continues to require skilled physical therapy for further progress with flexibility, strength and functional mobility.       Goals:   Goals: (to be met in 10 visits)   Pt will improve hamstrings length to normal to facilitate reduced pain and improved knee biomechanics  Pt will improve  glut max strength to 4+/5 B to facilitate reduced difficulty with going up hills and getting up from the floor  Pt will improve quad strength to 5/5 to ascend 1 flight of stairs reciprocally without UE assist   Pt will increase hip and knee strength to grossly 4+/5 to be able to get up and down from the floor safely   Pt will demonstrate increased hip  ER/ABD strength to 5-/5 to perform stepping and squatting activities without excessive femoral IR/ADD   Pt will improve SLS to 30s to improve safety with gait on uneven surfaces such as grass and gravel  Pt will be independent and compliant with comprehensive HEP to maintain progress achieved in PT     Plan: leg press; STM and taping as needed; hamstring stretching; glut/quad strengthening  Date: 3/29/2024  TX#: 2/10 Date:  4/2/2024               TX#: 3/10 Date:  4/5/2024               TX#: 4/10 Date: 4/9/2024                TX#: 5/10 Date: 5/13/2024  Tx#: 6/10 Date: 5/20/2024  Tx#: 7/10   TE: 30  NU step: L4 5' (pain on the extension; better with seat moved in) TE: 30  NU step: L4 5', no pain on the knee extension today TE: 30  NU step: L5 5', no pain on the knee extension today TE: 30  NU step: L6 5', no pain on the knee extension today TE: 35  NU step: L6 5', no pain on the knee extension today TE: 35  NU step: L6 5'   SB rolls DKTC  SB rolls LTR  Ankle pumps with feet over SB SB rolls DKTC  SB rolls LTR  Ankle pumps with feet over SB SB rolls DKTC  SLR 1# with hip ER x 12 L/R SLR 1.5# with hip ER x 12 L/R \"very hard\"  Bridge with SB 2 x 10  SLR 1.5# with hip ER x 12 L/R \"very hard\"  Bridge with SB 2 x 10   Bridge with march 2 x 5 L/R  Quadruped 10 x 5 sec hip extensions SLR 1.5# with hip ER x 15 L/R   Bridge with SB 2 x 10   Bridge with march 2 x 10 L/R  Quadruped 10 x 5 sec hip extensions L/R  Bird Dog x 3 L/R (limited by L knee pain in WBing)   Bridge with SB x 10  Bridge x 10  Clams x 20 L/R  Hkly TrA with marching x 15 L/R  ADD ball squeezes  ADD ball squeeze with march Bridge with SB 2 x 10   ADD ball squeezes 10 x 5 sec holds  Bridge with ADD ball x 15  T board calf stretch  T board A/P rocking  Red TB lateral steps x 20 L/R  DLS on Airex with cone taps x 10 L/R Bridge with SB 2 x 10   Red TB lateral steps x 20 L/R  SLB: form correction  Double leg press 1 black and 1 yellow cords: x 30 reps; mild L knee  pain with knee flexion  Single leg press 1 grey and 1 yellow cords: x 20 R  1 grey cord x 20, L Red TB lateral steps x 20 L/R  SLB: form correction  Double leg press 1 black and 1 grey cords: x 30 reps; mild L knee pain with knee flexion  Single leg press 1 black cord: x 20 R  1 grey cord x 20, L Red TB lateral steps x 20 L/R  Double leg press 1 black and 1 grey cords: x 30 reps; partial range 60-0  Single leg press 1 black cord: x 20 R  1 grey cord x 20, L  HEP update Red TB lateral steps x 20 L/R  SLB practice  Double leg press 2 black cords: x 30 reps; partial range 60-0  Single leg press 1 black cord: x 20 R  1 black cord x 20, L 45-90 range  HEP update   Manual: 15  STM B medial hamstrings and hip adductors  Manual hamstring stretch B  KT application for proprioceptive feedback B Manual: 10'  STM B medial hamstrings and hip adductors  Manual hamstring stretch B  KT application for proprioceptive feedback B Manual: 10'  STM B medial hamstrings and hip adductors  Manual hamstring stretch B  KT application for proprioceptive feedback B Manual: 10'  STM B medial hamstrings and hip adductors  Manual hamstring stretch B  KT application for proprioceptive feedback B Manual: 8'  STM B medial hamstrings and hip adductors  Manual hamstring stretch B Manual: 8'  Manual hamstring stretch B  KT application for proprioceptive feedback B   HEP: issued at [facility]   Access Code: [email or web address]  URL: [email or web address]  Date: 05/13/2024  Prepared by: Krystal Goldman    Exercises  - Supine Bridge  - 1 x daily - 7 x weekly - 2 sets - 10 reps  - Marching Bridge  - 1 x daily - 7 x weekly - 3 sets - 10 reps  - Straight Leg Raise with External Rotation  - 1 x daily - 7 x weekly - 3 sets - 10 reps  - Clamshell with Resistance  - 1 x daily - 7 x weekly - 2 sets - 10 reps  - Beginner Front Arm Support  - 1 x daily - 7 x weekly - 3 sets - 10 reps  - Single Leg Balance in March Position  - 1 x daily - 7 x weekly - 1 sets - 3 reps - 20  sec hold  - Side Stepping with Resistance at Ankles  - 1 x daily - 7 x weekly - 3 sets - 10 reps  - Heel Raises with Counter Support  - 1 x daily - 7 x weekly - 3 sets - 10 reps    Charges: TE x 2, man x 1       Total Timed Treatment: 43 min  Total Treatment Time: 43 min
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
## 2024-05-23 ENCOUNTER — OFFICE VISIT (OUTPATIENT)
Dept: HEMATOLOGY/ONCOLOGY | Facility: HOSPITAL | Age: 69
End: 2024-05-23
Attending: INTERNAL MEDICINE

## 2024-05-23 VITALS
RESPIRATION RATE: 14 BRPM | TEMPERATURE: 98 F | OXYGEN SATURATION: 96 % | SYSTOLIC BLOOD PRESSURE: 143 MMHG | BODY MASS INDEX: 20.13 KG/M2 | DIASTOLIC BLOOD PRESSURE: 84 MMHG | HEIGHT: 65 IN | WEIGHT: 120.81 LBS | HEART RATE: 77 BPM

## 2024-05-23 DIAGNOSIS — C49.A2 GASTROINTESTINAL STROMAL TUMOR (GIST) OF STOMACH (HCC): Primary | ICD-10-CM

## 2024-05-23 DIAGNOSIS — R73.03 PREDIABETES: ICD-10-CM

## 2024-05-23 DIAGNOSIS — E03.8 SUBCLINICAL HYPOTHYROIDISM: ICD-10-CM

## 2024-05-23 LAB
ALBUMIN SERPL-MCNC: 3.9 G/DL (ref 3.4–5)
ALBUMIN/GLOB SERPL: 1 {RATIO} (ref 1–2)
ALP LIVER SERPL-CCNC: 87 U/L
ALT SERPL-CCNC: 30 U/L
ANION GAP SERPL CALC-SCNC: 7 MMOL/L (ref 0–18)
AST SERPL-CCNC: 26 U/L (ref 15–37)
BASOPHILS # BLD AUTO: 0.03 X10(3) UL (ref 0–0.2)
BASOPHILS NFR BLD AUTO: 0.6 %
BILIRUB SERPL-MCNC: 0.4 MG/DL (ref 0.1–2)
BUN BLD-MCNC: 21 MG/DL (ref 9–23)
CALCIUM BLD-MCNC: 8.7 MG/DL (ref 8.5–10.1)
CHLORIDE SERPL-SCNC: 107 MMOL/L (ref 98–112)
CO2 SERPL-SCNC: 23 MMOL/L (ref 21–32)
CREAT BLD-MCNC: 0.74 MG/DL
EGFRCR SERPLBLD CKD-EPI 2021: 88 ML/MIN/1.73M2 (ref 60–?)
EOSINOPHIL # BLD AUTO: 0.04 X10(3) UL (ref 0–0.7)
EOSINOPHIL NFR BLD AUTO: 0.8 %
ERYTHROCYTE [DISTWIDTH] IN BLOOD BY AUTOMATED COUNT: 12.6 %
EST. AVERAGE GLUCOSE BLD GHB EST-MCNC: 117 MG/DL (ref 68–126)
GLOBULIN PLAS-MCNC: 3.8 G/DL (ref 2.8–4.4)
GLUCOSE BLD-MCNC: 106 MG/DL (ref 70–99)
HBA1C MFR BLD: 5.7 % (ref ?–5.7)
HCT VFR BLD AUTO: 38.8 %
HGB BLD-MCNC: 13.6 G/DL
IMM GRANULOCYTES # BLD AUTO: 0.01 X10(3) UL (ref 0–1)
IMM GRANULOCYTES NFR BLD: 0.2 %
LYMPHOCYTES # BLD AUTO: 1.59 X10(3) UL (ref 1–4)
LYMPHOCYTES NFR BLD AUTO: 33.7 %
MCH RBC QN AUTO: 30.3 PG (ref 26–34)
MCHC RBC AUTO-ENTMCNC: 35.1 G/DL (ref 31–37)
MCV RBC AUTO: 86.4 FL
MONOCYTES # BLD AUTO: 0.28 X10(3) UL (ref 0.1–1)
MONOCYTES NFR BLD AUTO: 5.9 %
NEUTROPHILS # BLD AUTO: 2.77 X10 (3) UL (ref 1.5–7.7)
NEUTROPHILS # BLD AUTO: 2.77 X10(3) UL (ref 1.5–7.7)
NEUTROPHILS NFR BLD AUTO: 58.8 %
OSMOLALITY SERPL CALC.SUM OF ELEC: 287 MOSM/KG (ref 275–295)
PLATELET # BLD AUTO: 143 10(3)UL (ref 150–450)
PLATELETS.RETICULATED NFR BLD AUTO: 1.8 % (ref 0–7)
POTASSIUM SERPL-SCNC: 3.9 MMOL/L (ref 3.5–5.1)
PROT SERPL-MCNC: 7.7 G/DL (ref 6.4–8.2)
RBC # BLD AUTO: 4.49 X10(6)UL
SODIUM SERPL-SCNC: 137 MMOL/L (ref 136–145)
T4 FREE SERPL-MCNC: 0.7 NG/DL (ref 0.8–1.7)
TSI SER-ACNC: 7.06 MIU/ML (ref 0.36–3.74)
WBC # BLD AUTO: 4.7 X10(3) UL (ref 4–11)

## 2024-05-23 PROCEDURE — 99214 OFFICE O/P EST MOD 30 MIN: CPT | Performed by: INTERNAL MEDICINE

## 2024-05-23 NOTE — PROGRESS NOTES
Patient here for follow-up. Had recent CT on 5/14/24. Still experiencing constipation and feeling cold often.

## 2024-05-29 ENCOUNTER — OFFICE VISIT (OUTPATIENT)
Dept: PHYSICAL THERAPY | Facility: HOSPITAL | Age: 69
End: 2024-05-29
Attending: INTERNAL MEDICINE

## 2024-05-29 PROCEDURE — 97110 THERAPEUTIC EXERCISES: CPT

## 2024-05-29 NOTE — PROGRESS NOTES
Diagnosis:   Acute pain of both knees (M25.561,M25.562)      Referring Provider: Kehinde Date of Evaluation:    3/25/2024    Precautions:  Cancer Next MD visit:   none scheduled  Date of Surgery: n/a   Insurance Primary/Secondary: MEDICARE / [employer] [place] INDEMNITY     # Auth Visits: 10 per POC            Subjective: Walking is getting much better. Also walks faster than before. Stairs are still difficult to do.    Pain: 2-3/10 with walking; 5-6/10 with going down the stairs      Objective:   90-90 Hamstring: L: lacking 20 deg but stiff(mild to moderate restrictions); R: 15 deg (mild restrictions)  Tenderness with increased tone B medial hamstrings L > R (improved)  Knee ROM: Full and pain-free B      Assessment: Full and pain-free ROM of both knees, hamstrings flexibility improved to mild restrictions only. Continued with posterior chain strengthening  and with quadriceps strengthening with emphasis on VMO. Tolerated well increased load with double and R single leg press today; kept same resistance with left leg press but that was increased last session only and she was able to complete more reps. Improved form with sit to stand transfers; able to hip hinge and no longer quad dominant but still limited with left knee pain. Continued with KT application for pain and proprioceptive feedback as finds it helpful. Patient continues to require skilled physical therapy for further progress with flexibility, strength and functional mobility.       Goals:   Goals: (to be met in 10 visits)   Pt will improve hamstrings length to normal to facilitate reduced pain and improved knee biomechanics  Pt will improve  glut max strength to 4+/5 B to facilitate reduced difficulty with going up hills and getting up from the floor  Pt will improve quad strength to 5/5 to ascend 1 flight of stairs reciprocally without UE assist   Pt will increase hip and knee strength to grossly 4+/5 to be able to get up and down from the floor safely   Pt will  demonstrate increased hip ER/ABD strength to 5-/5 to perform stepping and squatting activities without excessive femoral IR/ADD   Pt will improve SLS to 30s to improve safety with gait on uneven surfaces such as grass and gravel  Pt will be independent and compliant with comprehensive HEP to maintain progress achieved in PT     Plan: SLB and balance porgression; leg press; STM and taping as needed; hamstring stretching; glut/quad strengthening  Date: 3/29/2024  TX#: 2/10 Date:  4/2/2024               TX#: 3/10 Date:  4/5/2024               TX#: 4/10 Date: 4/9/2024                TX#: 5/10 Date: 5/13/2024  Tx#: 6/10 Date: 5/20/2024  Tx#: 7/10 Date: 5/28/2024  Tx#: 8/10   TE: 30  NU step: L4 5' (pain on the extension; better with seat moved in) TE: 30  NU step: L4 5', no pain on the knee extension today TE: 30  NU step: L5 5', no pain on the knee extension today TE: 30  NU step: L6 5', no pain on the knee extension today TE: 35  NU step: L6 5', no pain on the knee extension today TE: 35  NU step: L6 5' TE: 38  NU step: L6 5'  B hamstring stretch with a strap   SB rolls DKTC  SB rolls LTR  Ankle pumps with feet over SB SB rolls DKTC  SB rolls LTR  Ankle pumps with feet over SB SB rolls DKTC  SLR 1# with hip ER x 12 L/R SLR 1.5# with hip ER x 12 L/R \"very hard\"  Bridge with SB 2 x 10  SLR 1.5# with hip ER x 12 L/R \"very hard\"  Bridge with SB 2 x 10   Bridge with march 2 x 5 L/R  Quadruped 10 x 5 sec hip extensions SLR 1.5# with hip ER x 15 L/R   Bridge with SB 2 x 10   Bridge with march 2 x 10 L/R  Quadruped 10 x 5 sec hip extensions L/R  Bird Dog x 3 L/R (limited by L knee pain in WBing) SLR # 2 with hip ER x 10 L/R   Bridge with SB 2 x 10   Bridge with march 2 x 10 L/R  Quadruped 10 x 5 sec hip extensions L/R  Bird Dog x 5 L/R (limited by L knee pain in WBing)  Sit to stand x 10    Bridge with SB x 10  Bridge x 10  Clams x 20 L/R  Hkly TrA with marching x 15 L/R  ADD ball squeezes  ADD ball squeeze with march Bridge  with SB 2 x 10   ADD ball squeezes 10 x 5 sec holds  Bridge with ADD ball x 15  T board calf stretch  T board A/P rocking  Red TB lateral steps x 20 L/R  DLS on Airex with cone taps x 10 L/R Bridge with SB 2 x 10   Red TB lateral steps x 20 L/R  SLB: form correction  Double leg press 1 black and 1 yellow cords: x 30 reps; mild L knee pain with knee flexion  Single leg press 1 grey and 1 yellow cords: x 20 R  1 grey cord x 20, L Red TB lateral steps x 20 L/R  SLB: form correction  Double leg press 1 black and 1 grey cords: x 30 reps; mild L knee pain with knee flexion  Single leg press 1 black cord: x 20 R  1 grey cord x 20, L Red TB lateral steps x 20 L/R  Double leg press 1 black and 1 grey cords: x 30 reps; partial range 60-0  Single leg press 1 black cord: x 20 R  1 grey cord x 20, L  HEP update Red TB lateral steps x 20 L/R  SLB practice  Double leg press 2 black cords: x 30 reps; partial range 60-0  Single leg press 1 black cord: x 20 R  1 black cord x 20, L 45-90 range  HEP update Red TB lateral steps x 20 L/R  Double leg press 2 black and 1 yellow cords: x 30 reps; partial range 60-0  Single leg press 1 black and 1 yellow cords: x 30 R  1 black cord x 30 L     Manual: 15  STM B medial hamstrings and hip adductors  Manual hamstring stretch B  KT application for proprioceptive feedback B Manual: 10'  STM B medial hamstrings and hip adductors  Manual hamstring stretch B  KT application for proprioceptive feedback B Manual: 10'  STM B medial hamstrings and hip adductors  Manual hamstring stretch B  KT application for proprioceptive feedback B Manual: 10'  STM B medial hamstrings and hip adductors  Manual hamstring stretch B  KT application for proprioceptive feedback B Manual: 8'  STM B medial hamstrings and hip adductors  Manual hamstring stretch B Manual: 8'  Manual hamstring stretch B  KT application for proprioceptive feedback B Manual: 5'  KT application for proprioceptive feedback B   HEP: issued at [facility]    Access Code: [email or web address]  URL: [email or web address]  Date: 05/13/2024  Prepared by: Krystal Goldman    Exercises  - Supine Bridge  - 1 x daily - 7 x weekly - 2 sets - 10 reps  - Marching Bridge  - 1 x daily - 7 x weekly - 3 sets - 10 reps  - Straight Leg Raise with External Rotation  - 1 x daily - 7 x weekly - 3 sets - 10 reps  - Clamshell with Resistance  - 1 x daily - 7 x weekly - 2 sets - 10 reps  - Beginner Front Arm Support  - 1 x daily - 7 x weekly - 3 sets - 10 reps  - Single Leg Balance in March Position  - 1 x daily - 7 x weekly - 1 sets - 3 reps - 20 sec hold  - Side Stepping with Resistance at Ankles  - 1 x daily - 7 x weekly - 3 sets - 10 reps  - Heel Raises with Counter Support  - 1 x daily - 7 x weekly - 3 sets - 10 reps    Charges: TE x 3       Total Timed Treatment: 43 min  Total Treatment Time: 43 min

## 2024-06-03 ENCOUNTER — OFFICE VISIT (OUTPATIENT)
Dept: PHYSICAL THERAPY | Facility: HOSPITAL | Age: 69
End: 2024-06-03
Attending: INTERNAL MEDICINE
Payer: MEDICARE

## 2024-06-03 PROCEDURE — 97110 THERAPEUTIC EXERCISES: CPT

## 2024-06-03 NOTE — PROGRESS NOTES
Diagnosis:   Acute pain of both knees (M25.561,M25.562)      Referring Provider: Kehinde Date of Evaluation:    3/25/2024    Precautions:  Cancer Next MD visit:   none scheduled  Date of Surgery: n/a   Insurance Primary/Secondary: MEDICARE / BCBS JOSE ANTONIO LOPEZTY     # Auth Visits: 10 per POC            Subjective: Walked for about 40 min and started to feel some pain on the medial part of R knee, especially if tries to walk faster. Stairs are still difficult to do, coming down more challenging then climbing up.     Pain: 2-3/10 with walking; 5-6/10 with going down the stairs      Objective:   90-90 Hamstring: L: lacking 20 deg but stiff(mild to moderate restrictions); R: 15 deg (mild restrictions)  Tenderness with increased tone B medial hamstrings L > R (improved)  Knee ROM: Full and pain-free B      Assessment: Despite continued bilateral knee pain, patient feels that she is making improvement with physical therapy. Feels that she was improving better with twice per week therapy sessions. Wants to avoid medication/injections to her knees if possible. Discussed how to proceed with patient's knee rehab at this point and she chooses to continue with PT twice per week with further focus on knee strengthening. Continued with KT application for pain and proprioceptive feedback as finds it helpful. Patient continues to require skilled physical therapy for further progress with flexibility, strength and functional mobility.       Goals:   Goals: (to be met in 10 visits)   Pt will improve hamstrings length to normal to facilitate reduced pain and improved knee biomechanics  Pt will improve  glut max strength to 4+/5 B to facilitate reduced difficulty with going up hills and getting up from the floor  Pt will improve quad strength to 5/5 to ascend 1 flight of stairs reciprocally without UE assist   Pt will increase hip and knee strength to grossly 4+/5 to be able to get up and down from the floor safely   Pt will demonstrate  increased hip ER/ABD strength to 5-/5 to perform stepping and squatting activities without excessive femoral IR/ADD   Pt will improve SLS to 30s to improve safety with gait on uneven surfaces such as grass and gravel  Pt will be independent and compliant with comprehensive HEP to maintain progress achieved in PT     Plan: Re-assess; SLB and balance porgression; leg press; STM and taping as needed; hamstring stretching; glut/quad strengthening  Date: 3/29/2024  TX#: 2/10 Date:  4/2/2024               TX#: 3/10 Date:  4/5/2024               TX#: 4/10 Date: 4/9/2024                TX#: 5/10 Date: 5/13/2024  Tx#: 6/10 Date: 5/20/2024  Tx#: 7/10 Date: 5/28/2024  Tx#: 8/10 Date: 6/3/2024  Tx#: 9/10    TE: 30  NU step: L4 5' (pain on the extension; better with seat moved in) TE: 30  NU step: L4 5', no pain on the knee extension today TE: 30  NU step: L5 5', no pain on the knee extension today TE: 30  NU step: L6 5', no pain on the knee extension today TE: 35  NU step: L6 5', no pain on the knee extension today TE: 35  NU step: L6 5' TE: 38  NU step: L6 5'  B hamstring stretch with a strap TE: 38  NU step: L6 5'  B hamstring stretch with a strap    SB rolls DKTC  SB rolls LTR  Ankle pumps with feet over SB SB rolls DKTC  SB rolls LTR  Ankle pumps with feet over SB SB rolls DKTC  SLR 1# with hip ER x 12 L/R SLR 1.5# with hip ER x 12 L/R \"very hard\"  Bridge with SB 2 x 10  SLR 1.5# with hip ER x 12 L/R \"very hard\"  Bridge with SB 2 x 10   Bridge with march 2 x 5 L/R  Quadruped 10 x 5 sec hip extensions SLR 1.5# with hip ER x 15 L/R   Bridge with SB 2 x 10   Bridge with march 2 x 10 L/R  Quadruped 10 x 5 sec hip extensions L/R  Bird Dog x 3 L/R (limited by L knee pain in WBing) SLR # 2 with hip ER x 10 L/R   Bridge with SB 2 x 10   Bridge with march 2 x 10 L/R  Quadruped 10 x 5 sec hip extensions L/R  Bird Dog x 5 L/R (limited by L knee pain in WBing)  Sit to stand x 10  SLR # 2 with hip ER x 10 L/R   Bridge with SB 2 x 10    Bridge with march 2 x 10 L/R  Quadruped 10 x 5 sec hip extensions L/R  Bird Dog x 5 L/R (limited by L knee pain in WBing)  Sit to stand x 10     Bridge with SB x 10  Bridge x 10  Clams x 20 L/R  Hkly TrA with marching x 15 L/R  ADD ball squeezes  ADD ball squeeze with march Bridge with SB 2 x 10   ADD ball squeezes 10 x 5 sec holds  Bridge with ADD ball x 15  T board calf stretch  T board A/P rocking  Red TB lateral steps x 20 L/R  DLS on Airex with cone taps x 10 L/R Bridge with SB 2 x 10   Red TB lateral steps x 20 L/R  SLB: form correction  Double leg press 1 black and 1 yellow cords: x 30 reps; mild L knee pain with knee flexion  Single leg press 1 grey and 1 yellow cords: x 20 R  1 grey cord x 20, L Red TB lateral steps x 20 L/R  SLB: form correction  Double leg press 1 black and 1 grey cords: x 30 reps; mild L knee pain with knee flexion  Single leg press 1 black cord: x 20 R  1 grey cord x 20, L Red TB lateral steps x 20 L/R  Double leg press 1 black and 1 grey cords: x 30 reps; partial range 60-0  Single leg press 1 black cord: x 20 R  1 grey cord x 20, L  HEP update Red TB lateral steps x 20 L/R  SLB practice  Double leg press 2 black cords: x 30 reps; partial range 60-0  Single leg press 1 black cord: x 20 R  1 black cord x 20, L 45-90 range  HEP update Red TB lateral steps x 20 L/R  Double leg press 2 black and 1 yellow cords: x 30 reps; partial range 60-0  Single leg press 1 black and 1 yellow cords: x 30 R  1 black cord x 30 L   Red TB lateral steps x 20 L/R  Double leg press 2 black and 1 yellow cords: x 30 reps; full range, Green TB for hip abduction  Single leg press 1 black and 1 yellow cords: x 30 R  1 black cord x 30 L    Manual: 15  STM B medial hamstrings and hip adductors  Manual hamstring stretch B  KT application for proprioceptive feedback B Manual: 10'  STM B medial hamstrings and hip adductors  Manual hamstring stretch B  KT application for proprioceptive feedback B Manual: 10'  STM B  medial hamstrings and hip adductors  Manual hamstring stretch B  KT application for proprioceptive feedback B Manual: 10'  STM B medial hamstrings and hip adductors  Manual hamstring stretch B  KT application for proprioceptive feedback B Manual: 8'  STM B medial hamstrings and hip adductors  Manual hamstring stretch B Manual: 8'  Manual hamstring stretch B  KT application for proprioceptive feedback B Manual: 5'  KT application for proprioceptive feedback B Manual: 5'  KT application for proprioceptive feedback B    HEP: issued at [facility]   Access Code: [email or web address]  URL: [email or web address]  Date: 05/13/2024  Prepared by: Krystal Goldman    Exercises  - Supine Bridge  - 1 x daily - 7 x weekly - 2 sets - 10 reps  - Marching Bridge  - 1 x daily - 7 x weekly - 3 sets - 10 reps  - Straight Leg Raise with External Rotation  - 1 x daily - 7 x weekly - 3 sets - 10 reps  - Clamshell with Resistance  - 1 x daily - 7 x weekly - 2 sets - 10 reps  - Beginner Front Arm Support  - 1 x daily - 7 x weekly - 3 sets - 10 reps  - Single Leg Balance in March Position  - 1 x daily - 7 x weekly - 1 sets - 3 reps - 20 sec hold  - Side Stepping with Resistance at Ankles  - 1 x daily - 7 x weekly - 3 sets - 10 reps  - Heel Raises with Counter Support  - 1 x daily - 7 x weekly - 3 sets - 10 reps    Charges: TE x 3, man x 0       Total Timed Treatment: 43 min  Total Treatment Time: 43 min

## 2024-06-10 ENCOUNTER — OFFICE VISIT (OUTPATIENT)
Dept: PHYSICAL THERAPY | Facility: HOSPITAL | Age: 69
End: 2024-06-10
Attending: INTERNAL MEDICINE
Payer: MEDICARE

## 2024-06-10 PROCEDURE — 97110 THERAPEUTIC EXERCISES: CPT

## 2024-06-10 NOTE — PROGRESS NOTES
Progress Summary  Pt has attended 10 visits in Physical Therapy.     Subjective: Overall reduced knee pain. Reports good improvement in pain with walking but continues to be limited with going up and down the stairs. Completes stairs with a step to pattern; unable to do stairs reciprocally. Any activities involving squatting are very challenging due to pain as well. Wants to avoid medications/injections to her knees if possible.      Pain: 2-3/10 with walking fast or longer distances; 5-6/10 with going down the stairs    LEFS: 60 points, indicating mild to moderate functional limitations.     Assessment: Vonnie is making improvement with physical therapy but continues to be limited by medial bilateral knee pain L > R with going up and down the stairs, squatting, walking fast or on uneven surfaces. Contributing impairments include posterior chain muscle strength deficits, lumbar-pelvic motor control and dynamic knee control. LEFS score of 60 indicates mild to moderate functional limitations. Continued participation in skilled physical therapy is medically necessary for further progress toward patient's goals and to facilitate return to regular daily activities.      Objective:   Observation/Posture: Flattened lumbar lordosis; increased upper thoracic kyphosis with HFP      Knee and hip ROM full and pain-free B    Palpation: tenderness to palpation over left medial knee aspect. No longer with tenderness and stiffness along medial hamstrings B    Strength: (* denotes performed with pain)  LE   Hip flexion (L2): R 4/5; L 4/5  Hip abduction: R 4/5; L 4/5  Hip Extension: R 4/5; L 4/5  Glut max: R: 4-/5; L: 4-/5   Hip ER: R 4/5; L 4-/5  Hip IR: R 5/5; L 5/5  Knee Flexion: R 5/5; L 5/5   Knee extension (L3): R 5/5; L 5-/5   DF (L4): R 5/5; L 5/5  Great Toe Ext (L5): R 5/5, L 5/5  PF (S1): R 5/5; L 5/5     Flexibility:   LE   Hip Flexor: R WNL, L WNL  Hamstrings: R 12, WNL; L 12, WNL   Piriformis: normal length; L normal  length  Quads: R full length; L full length  Gastroc-soleus: R normal length; L normal length     30 sec STS: 6.5 reps (knee pain)    Gait: pt ambulates on level ground with normal mechanics.  Balance: SLS R 30 sec (somewhat unsteady), L 30 sec (somewhat unsteady)    Goals: (to be met in 10 + 10 visits)   Pt will improve hamstrings length to normal to facilitate reduced pain and improved knee biomechanics. Met  Pt will improve  glut max strength to 4+/5 B to facilitate reduced difficulty with going up hills and getting up from the floor. Progressing  Pt will improve quad strength to 5/5 to ascend 1 flight of stairs reciprocally without UE assist. Progressing  Pt will increase hip and knee strength to grossly 4+/5 to be able to get up and down from the floor safely   Pt will demonstrate increased hip ER/ABD strength to 5-/5 to perform stepping and squatting activities without excessive femoral IR/ADD. Progressing  Pt will improve SLS to 30s to improve safety with gait on uneven surfaces such as grass and gravel. Met  Pt will be independent and compliant with comprehensive HEP to maintain progress achieved in PT. Met    Rehab Potential: good    Plan: Continue skilled Physical Therapy 2 x/week or a total of 8 visits over a 90 day period. Treatment will include: Manual therapy: soft tissue and joint mobilizations to restore normal joint mechanics and decrease pain; Therapeutic exercises including ROM, strengthening, stretching program; Neuromuscular re-education for proprioceptive/balance training, pelvic and core stabilization; Pt. education for posture, body mechanics, and ergonomics, Modalities as needed (including heat/cold and e-stim for pain relief), HEP instruction and progression         Patient/Family/Caregiver was advised of these findings, precautions, and treatment options and has agreed to actively participate in planning and for this course of care.    Thank you for your referral. If you have any  questions, please contact me at Dept: [phone].    Sincerely,  Electronically signed by therapist: Krystal Goldman PT    Physician's certification required: Yes  Please co-sign or sign and return this letter via fax as soon as possible to [phone].   I certify the need for these services furnished under this plan of treatment and while under my care.    X___________________________________________________ Date____________________    Certification From: 6/10/2024  To:9/8/2024              Date: 3/29/2024  TX#: 2/10 Date:  4/2/2024               TX#: 3/10 Date:  4/5/2024               TX#: 4/10 Date: 4/9/2024                TX#: 5/10 Date: 5/13/2024  Tx#: 6/10 Date: 5/20/2024  Tx#: 7/10 Date: 5/28/2024  Tx#: 8/10 Date: 6/3/2024  Tx#: 9/10 Date: 6/10/2024  Tx#: 10/10   TE: 30  NU step: L4 5' (pain on the extension; better with seat moved in) TE: 30  NU step: L4 5', no pain on the knee extension today TE: 30  NU step: L5 5', no pain on the knee extension today TE: 30  NU step: L6 5', no pain on the knee extension today TE: 35  NU step: L6 5', no pain on the knee extension today TE: 35  NU step: L6 5' TE: 38  NU step: L6 5'  B hamstring stretch with a strap TE: 40  NU step: L6 5'  B hamstring stretch with a strap TE: 38  NU step: L6 5'  B hamstring stretch with a strap   SB rolls DKTC  SB rolls LTR  Ankle pumps with feet over SB SB rolls DKTC  SB rolls LTR  Ankle pumps with feet over SB SB rolls DKTC  SLR 1# with hip ER x 12 L/R SLR 1.5# with hip ER x 12 L/R \"very hard\"  Bridge with SB 2 x 10  SLR 1.5# with hip ER x 12 L/R \"very hard\"  Bridge with SB 2 x 10   Bridge with march 2 x 5 L/R  Quadruped 10 x 5 sec hip extensions SLR 1.5# with hip ER x 15 L/R   Bridge with SB 2 x 10   Bridge with march 2 x 10 L/R  Quadruped 10 x 5 sec hip extensions L/R  Bird Dog x 3 L/R (limited by L knee pain in WBing) SLR # 2 with hip ER x 10 L/R   Bridge with SB 2 x 10   Bridge with march 2 x 10 L/R  Quadruped 10 x 5 sec hip  extensions L/R  Bird Dog x 5 L/R (limited by L knee pain in WBing)  Sit to stand x 10  SLR # 2 with hip ER x 10 L/R   Bridge with SB 2 x 10   Bridge with march 2 x 10 L/R  Quadruped 10 x 5 sec hip extensions L/R  Bird Dog x 5 L/R (limited by L knee pain in WBing)  Sit to stand x 10  B hip abduction in sdly 2 x 10 L/R (difficult)  B hip extension in prone 2 x 10 L/R   SLR # 2 with hip ER x 10 L/R   Bridge with SB 2 x 10   Bridge with march 2 x 10 L/R  Quadruped 10 x 5 sec hip extensions L/R  Bird Dog x 5 L/R (limited by L knee pain in WBing)  Sit to stand x 10    Bridge with SB x 10  Bridge x 10  Clams x 20 L/R  Hkly TrA with marching x 15 L/R  ADD ball squeezes  ADD ball squeeze with march Bridge with SB 2 x 10   ADD ball squeezes 10 x 5 sec holds  Bridge with ADD ball x 15  T board calf stretch  T board A/P rocking  Red TB lateral steps x 20 L/R  DLS on Airex with cone taps x 10 L/R Bridge with SB 2 x 10   Red TB lateral steps x 20 L/R  SLB: form correction  Double leg press 1 black and 1 yellow cords: x 30 reps; mild L knee pain with knee flexion  Single leg press 1 grey and 1 yellow cords: x 20 R  1 grey cord x 20, L Red TB lateral steps x 20 L/R  SLB: form correction  Double leg press 1 black and 1 grey cords: x 30 reps; mild L knee pain with knee flexion  Single leg press 1 black cord: x 20 R  1 grey cord x 20, L Red TB lateral steps x 20 L/R  Double leg press 1 black and 1 grey cords: x 30 reps; partial range 60-0  Single leg press 1 black cord: x 20 R  1 grey cord x 20, L  HEP update Red TB lateral steps x 20 L/R  SLB practice  Double leg press 2 black cords: x 30 reps; partial range 60-0  Single leg press 1 black cord: x 20 R  1 black cord x 20, L 45-90 range  HEP update Red TB lateral steps x 20 L/R  Double leg press 2 black and 1 yellow cords: x 30 reps; partial range 60-0  Single leg press 1 black and 1 yellow cords: x 30 R  1 black cord x 30 L   Red TB lateral steps x 20 L/R  Double leg press 2 black and  1 yellow cords: x 30 reps; full range, Green TB for hip abduction  Single leg press 1 black and 1 yellow cords: x 30 R  1 black cord x 30 L Heel raises x 15 L/R  Re-assessment   Manual: 15  STM B medial hamstrings and hip adductors  Manual hamstring stretch B  KT application for proprioceptive feedback B Manual: 10'  STM B medial hamstrings and hip adductors  Manual hamstring stretch B  KT application for proprioceptive feedback B Manual: 10'  STM B medial hamstrings and hip adductors  Manual hamstring stretch B  KT application for proprioceptive feedback B Manual: 10'  STM B medial hamstrings and hip adductors  Manual hamstring stretch B  KT application for proprioceptive feedback B Manual: 8'  STM B medial hamstrings and hip adductors  Manual hamstring stretch B Manual: 8'  Manual hamstring stretch B  KT application for proprioceptive feedback B Manual: 5'  KT application for proprioceptive feedback B Manual: 5'  KT application for proprioceptive feedback B    HEP: issued at [facility] [employer]   Access Code: [email or web address]  URL: [email or web address]  Date: 05/13/2024  Prepared by: Krystal Goldman    Exercises  - Supine Bridge  - 1 x daily - 7 x weekly - 2 sets - 10 reps  - Marching Bridge  - 1 x daily - 7 x weekly - 3 sets - 10 reps  - Straight Leg Raise with External Rotation  - 1 x daily - 7 x weekly - 3 sets - 10 reps  - Clamshell with Resistance  - 1 x daily - 7 x weekly - 2 sets - 10 reps  - Beginner Front Arm Support  - 1 x daily - 7 x weekly - 3 sets - 10 reps  - Single Leg Balance in March Position  - 1 x daily - 7 x weekly - 1 sets - 3 reps - 20 sec hold  - Side Stepping with Resistance at Ankles  - 1 x daily - 7 x weekly - 3 sets - 10 reps  - Heel Raises with Counter Support  - 1 x daily - 7 x weekly - 3 sets - 10 reps    Charges: TE x 3, man x 0       Total Timed Treatment: 40 min  Total Treatment Time: 40 min

## 2024-06-17 NOTE — PROGRESS NOTES
Subjective: \"oh yeah, knees getting much better but still have trouble going up and down stairs.\"    Pain: 0/10 at present; at worst in last week 7/10 (on stairs)     LEFS: 60 points, indicating mild to moderate functional limitations.     Assessment: Notable hip weakness to MMT as well as functionally with step ups and dynamic valgus R > L. Continued progression with strengthening for these and use of band with step ups to bias lateral hip to work on valgus. Vonnie is very motivated and compliant in therapy making her a great candidate for continued improvement.       Objective:   Observation/Posture: dynamic valgus R>L step ups (6/19/2024)    Knee and hip ROM full and pain-free B    Palpation: tenderness to palpation over left medial knee aspect. No longer with tenderness and stiffness along medial hamstrings B    Strength: (* denotes performed with pain)  (6/19/2024)     Hip abduction: R 4-/5; L 4-/5  Hip Extension: R 4-/5; L 4-/5  Glut max: R: 4-/5; L: 4-/5   Hip ER: R 4/5; L 4-/5 (from prior test date)  Hip IR: R 5/5; L 5/5(from prior test date)      Flexibility: (6/19/2024)   LE   Hip Flexor: R WNL, L WNL  Hamstrings: R 12, WNL; L 12, WNL   Piriformis: normal length; L normal length  Quads: R full length; L full length  Gastroc-soleus: R normal length; L normal length     30 sec STS:  (6/10/24) 6.5 reps (knee pain)    Goals: (to be met in 10 + 8 visits)   Pt will improve hamstrings length to normal to facilitate reduced pain and improved knee biomechanics. Met  Pt will improve  glut max strength to 4+/5 B to facilitate reduced difficulty with going up hills and getting up from the floor. Progressing  Pt will improve quad strength to 5/5 to ascend 1 flight of stairs reciprocally without UE assist. Progressing  Pt will increase hip and knee strength to grossly 4+/5 to be able to get up and down from the floor safely   Pt will demonstrate increased hip ER/ABD strength to 5-/5 to perform stepping and  squatting activities without excessive femoral IR/ADD. Progressing  Pt will improve SLS to 30s to improve safety with gait on uneven surfaces such as grass and gravel. Met  Pt will be independent and compliant with comprehensive HEP to maintain progress achieved in PT. Met    Rehab Potential: good    Plan: upgrade HEP        Certification From: 6/10/2024  To:9/8/2024        Date: 4/9/2024                TX#: 5/10 Date: 5/13/2024  Tx#: 6/10 Date: 5/20/2024  Tx#: 7/10 Date: 5/28/2024  Tx#: 8/10 Date: 6/3/2024  Tx#: 9/10 Date: 6/10/2024  Tx#: 10/10 6/19/2024  11/18     TE: 30  NU step: L6 5', no pain on the knee extension today TE: 35  NU step: L6 5', no pain on the knee extension today TE: 35  NU step: L6 5' TE: 38  NU step: L6 5'  B hamstring stretch with a strap TE: 40  NU step: L6 5'  B hamstring stretch with a strap TE: 38  NU step: L6 5'  B hamstring stretch with a strap Therapeutic Exercise:   Re-assessment-new to therapist, subj and obj    SLR 1.5# with hip ER x 12 L/R \"very hard\"  Bridge with SB 2 x 10  SLR 1.5# with hip ER x 12 L/R \"very hard\"  Bridge with SB 2 x 10   Bridge with march 2 x 5 L/R  Quadruped 10 x 5 sec hip extensions SLR 1.5# with hip ER x 15 L/R   Bridge with SB 2 x 10   Bridge with march 2 x 10 L/R  Quadruped 10 x 5 sec hip extensions L/R  Bird Dog x 3 L/R (limited by L knee pain in WBing) SLR # 2 with hip ER x 10 L/R   Bridge with SB 2 x 10   Bridge with march 2 x 10 L/R  Quadruped 10 x 5 sec hip extensions L/R  Bird Dog x 5 L/R (limited by L knee pain in WBing)  Sit to stand x 10  SLR # 2 with hip ER x 10 L/R   Bridge with SB 2 x 10   Bridge with march 2 x 10 L/R  Quadruped 10 x 5 sec hip extensions L/R  Bird Dog x 5 L/R (limited by L knee pain in WBing)  Sit to stand x 10  B hip abduction in sdly 2 x 10 L/R (difficult)  B hip extension in prone 2 x 10 L/R   SLR # 2 with hip ER x 10 L/R   Bridge with SB 2 x 10   Bridge with march 2 x 10 L/R  Quadruped 10 x 5 sec hip extensions L/R  Bird Dog  x 5 L/R (limited by L knee pain in WBing)  Sit to stand x 10  SLR with ER bias 15x2 ea   Glut max bridges x20   Figure 4 bridge 2 x 8   Abdominal brace hold with count for breath 5 x 5s  Abdominal brace sequential march 5 ea   LBW at feet 3 x 10   FSU 6\" step band for ER bias 10 ea   Hip hikes 10 ea      Red TB lateral steps x 20 L/R  SLB: form correction  Double leg press 1 black and 1 grey cords: x 30 reps; mild L knee pain with knee flexion  Single leg press 1 black cord: x 20 R  1 grey cord x 20, L Red TB lateral steps x 20 L/R  Double leg press 1 black and 1 grey cords: x 30 reps; partial range 60-0  Single leg press 1 black cord: x 20 R  1 grey cord x 20, L  HEP update Red TB lateral steps x 20 L/R  SLB practice  Double leg press 2 black cords: x 30 reps; partial range 60-0  Single leg press 1 black cord: x 20 R  1 black cord x 20, L 45-90 range  HEP update Red TB lateral steps x 20 L/R  Double leg press 2 black and 1 yellow cords: x 30 reps; partial range 60-0  Single leg press 1 black and 1 yellow cords: x 30 R  1 black cord x 30 L   Red TB lateral steps x 20 L/R  Double leg press 2 black and 1 yellow cords: x 30 reps; full range, Green TB for hip abduction  Single leg press 1 black and 1 yellow cords: x 30 R  1 black cord x 30 L Heel raises x 15 L/R  Re-assessment    Manual: 10'  STM B medial hamstrings and hip adductors  Manual hamstring stretch B  KT application for proprioceptive feedback B Manual: 8'  STM B medial hamstrings and hip adductors  Manual hamstring stretch B Manual: 8'  Manual hamstring stretch B  KT application for proprioceptive feedback B Manual: 5'  KT application for proprioceptive feedback B Manual: 5'  KT application for proprioceptive feedback B  Manual Therapy  Patellar glides in mid ranges flexion superior and medial    HEP: issued at [facility]   Access Code: [email or web address]  URL: [email or web address]  Date: 05/13/2024  Prepared by: Krystal Goldman    Exercises  - Supine Bridge  -  1 x daily - 7 x weekly - 2 sets - 10 reps  - Marching Bridge  - 1 x daily - 7 x weekly - 3 sets - 10 reps  - Straight Leg Raise with External Rotation  - 1 x daily - 7 x weekly - 3 sets - 10 reps  - Clamshell with Resistance  - 1 x daily - 7 x weekly - 2 sets - 10 reps  - Beginner Front Arm Support  - 1 x daily - 7 x weekly - 3 sets - 10 reps  - Single Leg Balance in March Position  - 1 x daily - 7 x weekly - 1 sets - 3 reps - 20 sec hold  - Side Stepping with Resistance at Ankles  - 1 x daily - 7 x weekly - 3 sets - 10 reps  - Heel Raises with Counter Support  - 1 x daily - 7 x weekly - 3 sets - 10 reps    Charges: TE x 2, man x 1       Total Timed Treatment: 43 min  Total Treatment Time: 43 min

## 2024-06-19 ENCOUNTER — OFFICE VISIT (OUTPATIENT)
Dept: PHYSICAL THERAPY | Facility: HOSPITAL | Age: 69
End: 2024-06-19
Attending: INTERNAL MEDICINE
Payer: MEDICARE

## 2024-06-19 PROCEDURE — 97140 MANUAL THERAPY 1/> REGIONS: CPT

## 2024-06-19 PROCEDURE — 97110 THERAPEUTIC EXERCISES: CPT

## 2024-06-19 NOTE — PROGRESS NOTES
Subjective: \"L side has more pain on stairs, worse going up. Had butt soreness when getting up after meals.     Pain: 0/10 at present; at worst in last week 7/10 (on stairs)     LEFS: 60 points, indicating mild to moderate functional limitations.     Assessment: Attempted ball squats as patient difficulty with form, still difficulty with form, use tactile cues. Then with trials superior vs medial glides MWM. Relief with medial glides. Trial leukotape for medial glides, re-assessed and good tolerance for stairs then. Risks and benefits of tape explained to patient. Instructed to remove tape Sunday night when wet (being seen Monday), or sooner if signs skin irritation such as itching/burning. Patient agreeable to taping.       Objective:   Relief of medial knee pain with medial glides by PT with ball squat (6/21/2024), also relief following taping  Observation/Posture: dynamic valgus R>L step ups (6/19/2024)    Knee and hip ROM full and pain-free B    Palpation: tenderness to palpation over left medial knee aspect. No longer with tenderness and stiffness along medial hamstrings B    Strength: (* denotes performed with pain)  (6/19/2024)     Hip abduction: R 4-/5; L 4-/5  Hip Extension: R 4-/5; L 4-/5  Glut max: R: 4-/5; L: 4-/5   Hip ER: R 4/5; L 4-/5 (from prior test date)  Hip IR: R 5/5; L 5/5(from prior test date)      Flexibility: (6/19/2024)   LE   Hip Flexor: R WNL, L WNL  Hamstrings: R 12, WNL; L 12, WNL   Piriformis: normal length; L normal length  Quads: R full length; L full length  Gastroc-soleus: R normal length; L normal length     30 sec STS:  (6/10/24) 6.5 reps (knee pain)    Goals: (to be met in 10 + 8 visits)   Pt will improve hamstrings length to normal to facilitate reduced pain and improved knee biomechanics. Met  Pt will improve  glut max strength to 4+/5 B to facilitate reduced difficulty with going up hills and getting up from the floor. Progressing  Pt will improve quad strength to 5/5 to  ascend 1 flight of stairs reciprocally without UE assist. Progressing  Pt will increase hip and knee strength to grossly 4+/5 to be able to get up and down from the floor safely   Pt will demonstrate increased hip ER/ABD strength to 5-/5 to perform stepping and squatting activities without excessive femoral IR/ADD. Progressing  Pt will improve SLS to 30s to improve safety with gait on uneven surfaces such as grass and gravel. Met  Pt will be independent and compliant with comprehensive HEP to maintain progress achieved in PT. Met    Rehab Potential: good    Plan: upgrade HEP        Certification From: 6/10/2024  To:9/8/2024        Date: 4/9/2024                TX#: 5/10 Date: 5/13/2024  Tx#: 6/10 Date: 5/20/2024  Tx#: 7/10 Date: 5/28/2024  Tx#: 8/10 Date: 6/3/2024  Tx#: 9/10 Date: 6/10/2024  Tx#: 10/10 6/19/2024  11/18   6/21/24:   12/18   TE: 30  NU step: L6 5', no pain on the knee extension today TE: 35  NU step: L6 5', no pain on the knee extension today TE: 35  NU step: L6 5' TE: 38  NU step: L6 5'  B hamstring stretch with a strap TE: 40  NU step: L6 5'  B hamstring stretch with a strap TE: 38  NU step: L6 5'  B hamstring stretch with a strap Therapeutic Exercise:   Re-assessment-new to therapist, subj and obj  Therapeutic Exercise:   Straight leg bridge on ball 2 x 10     SLR 1.5# with hip ER x 12 L/R \"very hard\"  Bridge with SB 2 x 10  SLR 1.5# with hip ER x 12 L/R \"very hard\"  Bridge with SB 2 x 10   Bridge with march 2 x 5 L/R  Quadruped 10 x 5 sec hip extensions SLR 1.5# with hip ER x 15 L/R   Bridge with SB 2 x 10   Bridge with march 2 x 10 L/R  Quadruped 10 x 5 sec hip extensions L/R  Bird Dog x 3 L/R (limited by L knee pain in WBing) SLR # 2 with hip ER x 10 L/R   Bridge with SB 2 x 10   Bridge with march 2 x 10 L/R  Quadruped 10 x 5 sec hip extensions L/R  Bird Dog x 5 L/R (limited by L knee pain in WBing)  Sit to stand x 10  SLR # 2 with hip ER x 10 L/R   Bridge with SB 2 x 10   Bridge with march 2 x  10 L/R  Quadruped 10 x 5 sec hip extensions L/R  Bird Dog x 5 L/R (limited by L knee pain in WBing)  Sit to stand x 10  B hip abduction in sdly 2 x 10 L/R (difficult)  B hip extension in prone 2 x 10 L/R   SLR # 2 with hip ER x 10 L/R   Bridge with SB 2 x 10   Bridge with march 2 x 10 L/R  Quadruped 10 x 5 sec hip extensions L/R  Bird Dog x 5 L/R (limited by L knee pain in WBing)  Sit to stand x 10  SLR with ER bias 15x2 ea   Glut max bridges x20   Figure 4 bridge 2 x 8   Abdominal brace hold with count for breath 5 x 5s  Abdominal brace sequential march 5 ea   LBW at feet 3 x 10   FSU 6\" step band for ER bias 10 ea   Hip hikes 10 ea    LBW RTB 3 x 10'   Hip hikes 12x ea   Step ups 12x ea 6\" step with bias for ER/abd BTB  HEP Upgrade and review   Pallof press RTB 2 x 20      Manual Therapy  Leukotape for medial glide L knee  Medial glide with ball squats (takes away pain x 10, x 5  Patellar glides medial R G3 (reports feels good)    Red TB lateral steps x 20 L/R  SLB: form correction  Double leg press 1 black and 1 grey cords: x 30 reps; mild L knee pain with knee flexion  Single leg press 1 black cord: x 20 R  1 grey cord x 20, L Red TB lateral steps x 20 L/R  Double leg press 1 black and 1 grey cords: x 30 reps; partial range 60-0  Single leg press 1 black cord: x 20 R  1 grey cord x 20, L  HEP update Red TB lateral steps x 20 L/R  SLB practice  Double leg press 2 black cords: x 30 reps; partial range 60-0  Single leg press 1 black cord: x 20 R  1 black cord x 20, L 45-90 range  HEP update Red TB lateral steps x 20 L/R  Double leg press 2 black and 1 yellow cords: x 30 reps; partial range 60-0  Single leg press 1 black and 1 yellow cords: x 30 R  1 black cord x 30 L   Red TB lateral steps x 20 L/R  Double leg press 2 black and 1 yellow cords: x 30 reps; full range, Green TB for hip abduction  Single leg press 1 black and 1 yellow cords: x 30 R  1 black cord x 30 L Heel raises x 15 L/R  Re-assessment     Manual:  10'  STM B medial hamstrings and hip adductors  Manual hamstring stretch B  KT application for proprioceptive feedback B Manual: 8'  STM B medial hamstrings and hip adductors  Manual hamstring stretch B Manual: 8'  Manual hamstring stretch B  KT application for proprioceptive feedback B Manual: 5'  KT application for proprioceptive feedback B Manual: 5'  KT application for proprioceptive feedback B  Manual Therapy  Patellar glides in mid ranges flexion superior and medial     HEP:   Access Code: [email or web address]  Exercises  - Bridge with Heels on Swiss Ball  - 1 x daily - 7 x weekly - 2 sets - 10 reps  - Side Stepping with Resistance at Feet  - 1 x daily - 7 x weekly - 3 sets - 10 reps  - Hooklying Sequential Leg March and Lower  - 1 x daily - 7 x weekly - 2 sets - 10 reps  - Straight Leg Raise with External Rotation  - 1 x daily - 7 x weekly - 2 sets - 15 reps    Charges: TE x 2, man x 1       Total Timed Treatment: 43 min  Total Treatment Time: 43 min

## 2024-06-21 ENCOUNTER — OFFICE VISIT (OUTPATIENT)
Dept: PHYSICAL THERAPY | Facility: HOSPITAL | Age: 69
End: 2024-06-21
Attending: INTERNAL MEDICINE
Payer: MEDICARE

## 2024-06-21 PROCEDURE — 97140 MANUAL THERAPY 1/> REGIONS: CPT

## 2024-06-21 PROCEDURE — 97110 THERAPEUTIC EXERCISES: CPT

## 2024-06-24 ENCOUNTER — OFFICE VISIT (OUTPATIENT)
Dept: PHYSICAL THERAPY | Facility: HOSPITAL | Age: 69
End: 2024-06-24
Attending: NURSE PRACTITIONER
Payer: MEDICARE

## 2024-06-24 PROCEDURE — 97140 MANUAL THERAPY 1/> REGIONS: CPT

## 2024-06-24 PROCEDURE — 97110 THERAPEUTIC EXERCISES: CPT

## 2024-06-24 NOTE — PROGRESS NOTES
Subjective: Reports tape was helpful. Reports low back pain started 3 PM, use of advil and use of hot/cold pack. By Sunday morning felt much better. Didn't do any exercises since last time, except for sideways steps.     Pain: 0/10 at present; at worst in last week 7/10 (on stairs)     LEFS: 60 points, indicating mild to moderate functional limitations.     Assessment: s/s still suggestive of patellofemoral tracking dysfunction. Positive response to taping over weekend. Again with some medial pain L only with SL shuttle and relieved with manual medial glides. Notable increased difficulty with L SL press compared to R. Reported a lot of back soreness after last visit. Held pallof press and shortened lever on ball bridges. Luekotape applied L for medial glide again, instructed to remove Thursday or sooner if irritation.       Objective:   Relief of medial knee pain with medial glides by PT with ball squat (6/21/2024), also relief following taping  Observation/Posture: dynamic valgus R>L step ups (6/19/2024)    Knee and hip ROM full and pain-free B    Palpation: tenderness to palpation over left medial knee aspect. No longer with tenderness and stiffness along medial hamstrings B    Strength: (* denotes performed with pain)  (6/19/2024)     Hip abduction: R 4-/5; L 4-/5  Hip Extension: R 4-/5; L 4-/5  Glut max: R: 4-/5; L: 4-/5   Hip ER: R 4/5; L 4-/5 (from prior test date)  Hip IR: R 5/5; L 5/5(from prior test date)      Flexibility: (6/19/2024)   LE   Hip Flexor: R WNL, L WNL  Hamstrings: R 12, WNL; L 12, WNL   Piriformis: normal length; L normal length  Quads: R full length; L full length  Gastroc-soleus: R normal length; L normal length     30 sec STS:  (6/10/24) 6.5 reps (knee pain)    Goals: (to be met in 10 + 8 visits)   Pt will improve hamstrings length to normal to facilitate reduced pain and improved knee biomechanics. Met  Pt will improve  glut max strength to 4+/5 B to facilitate reduced difficulty  with going up hills and getting up from the floor. Progressing  Pt will improve quad strength to 5/5 to ascend 1 flight of stairs reciprocally without UE assist. Progressing  Pt will increase hip and knee strength to grossly 4+/5 to be able to get up and down from the floor safely   Pt will demonstrate increased hip ER/ABD strength to 5-/5 to perform stepping and squatting activities without excessive femoral IR/ADD. Progressing  Pt will improve SLS to 30s to improve safety with gait on uneven surfaces such as grass and gravel. Met  Pt will be independent and compliant with comprehensive HEP to maintain progress achieved in PT. Met    Rehab Potential: good    Plan: continue quad/glute and core progression as tolerated        Certification From: 6/10/2024  To:9/8/2024        Date: 5/28/2024  Tx#: 8/10 Date: 6/3/2024  Tx#: 9/10 Date: 6/10/2024  Tx#: 10/10 6/19/2024  11/18   6/21/24:   12/18 6/24/2024 13/18   TE: 38  NU step: L6 5'  B hamstring stretch with a strap TE: 40  NU step: L6 5'  B hamstring stretch with a strap TE: 38  NU step: L6 5'  B hamstring stretch with a strap Therapeutic Exercise:   Re-assessment-new to therapist, subj and obj  Therapeutic Exercise:   Straight leg bridge on ball 2 x 10   Therapeutic Exercise:   Short lever bridge 2 x 10   LTR x 8 ea  DKTC 20s x 3  Piriformis stretch 2 x 25s ea      SLR # 2 with hip ER x 10 L/R   Bridge with SB 2 x 10   Bridge with march 2 x 10 L/R  Quadruped 10 x 5 sec hip extensions L/R  Bird Dog x 5 L/R (limited by L knee pain in WBing)  Sit to stand x 10  SLR # 2 with hip ER x 10 L/R   Bridge with SB 2 x 10   Bridge with march 2 x 10 L/R  Quadruped 10 x 5 sec hip extensions L/R  Bird Dog x 5 L/R (limited by L knee pain in WBing)  Sit to stand x 10  B hip abduction in sdly 2 x 10 L/R (difficult)  B hip extension in prone 2 x 10 L/R   SLR # 2 with hip ER x 10 L/R   Bridge with SB 2 x 10   Bridge with march 2 x 10 L/R  Quadruped 10 x 5 sec hip extensions L/R  Bird  Dog x 5 L/R (limited by L knee pain in WBing)  Sit to stand x 10  SLR with ER bias 15x2 ea   Glut max bridges x20   Figure 4 bridge 2 x 8   Abdominal brace hold with count for breath 5 x 5s  Abdominal brace sequential march 5 ea   LBW at feet 3 x 10   FSU 6\" step band for ER bias 10 ea   Hip hikes 10 ea    LBW RTB 3 x 10'   Hip hikes 12x ea   Step ups 12x ea 6\" step with bias for ER/abd BTB  HEP Upgrade and review   Pallof press RTB 2 x 20      Manual Therapy  Leukotape for medial glide L knee  Medial glide with ball squats (takes away pain x 10, x 5  Patellar glides medial R G3 (reports feels good)  DLP with GTB 20x 50#  SLP 31# 2 x 10 ea   YTB 3 way hip 5x ea, unilateral UE support    Red TB lateral steps x 20 L/R  Double leg press 2 black and 1 yellow cords: x 30 reps; partial range 60-0  Single leg press 1 black and 1 yellow cords: x 30 R  1 black cord x 30 L   Red TB lateral steps x 20 L/R  Double leg press 2 black and 1 yellow cords: x 30 reps; full range, Green TB for hip abduction  Single leg press 1 black and 1 yellow cords: x 30 R  1 black cord x 30 L Heel raises x 15 L/R  Re-assessment      Manual: 5'  KT application for proprioceptive feedback B Manual: 5'  KT application for proprioceptive feedback B  Manual Therapy  Patellar glides in mid ranges flexion superior and medial   Manual Therapy  Medial patellar glides in varying degrees of flexion B   Taping for medial glide leukotape L  x 9' total   HEP:   Access Code: [email or web address]  Exercises  - Bridge with Heels on Swiss Ball  - 1 x daily - 7 x weekly - 2 sets - 10 reps  - Side Stepping with Resistance at Feet  - 1 x daily - 7 x weekly - 3 sets - 10 reps  - Hooklying Sequential Leg March and Lower  - 1 x daily - 7 x weekly - 2 sets - 10 reps  - Straight Leg Raise with External Rotation  - 1 x daily - 7 x weekly - 2 sets - 15 reps      LBP stretches: DKTC, LTR, piriformis stretch    Charges: TE x 2, man x 1       Total Timed Treatment: 40 min  Total Treatment  Time: 40 min

## 2024-06-26 ENCOUNTER — APPOINTMENT (OUTPATIENT)
Dept: PHYSICAL THERAPY | Facility: HOSPITAL | Age: 69
End: 2024-06-26
Payer: MEDICARE

## 2024-06-26 NOTE — PROGRESS NOTES
Subjective: mild soreness core/gluteals last time but fine. Still knee pain on stairs. Going for her monthly massage this weekend, wondering if this will help. Has been using the low back stretches everyday.     Pain: on stairs 5/10 knee pain     LEFS: 60 points, indicating mild to moderate functional limitations.     Assessment: Instructed patient in self medial glides this date to trial after periods of prolonged sitting. Patient reports \"movie-goers\" knee type symptom after sitting prolonged for meals. Continued performance of this by PT as well. Continued focus on VMO and gluteal strengthening as well as core activation.       Objective:   Relief of medial knee pain with medial glides by PT with ball squat (6/21/2024), also relief following taping  Observation/Posture: dynamic valgus R>L step ups (6/19/2024)    Knee and hip ROM full and pain-free B    Palpation: tenderness to palpation over left medial knee aspect. No longer with tenderness and stiffness along medial hamstrings B    Strength: (* denotes performed with pain)  (6/19/2024)     Hip abduction: R 4-/5; L 4-/5  Hip Extension: R 4-/5; L 4-/5  Glut max: R: 4-/5; L: 4-/5   Hip ER: R 4/5; L 4-/5 (from prior test date)  Hip IR: R 5/5; L 5/5(from prior test date)      Flexibility: (6/19/2024)   LE   Hip Flexor: R WNL, L WNL  Hamstrings: R 12, WNL; L 12, WNL   Piriformis: normal length; L normal length  Quads: R full length; L full length  Gastroc-soleus: R normal length; L normal length     30 sec STS:  (6/10/24) 6.5 reps (knee pain)    Goals: (to be met in 10 + 8 visits)   Pt will improve hamstrings length to normal to facilitate reduced pain and improved knee biomechanics. Met  Pt will improve  glut max strength to 4+/5 B to facilitate reduced difficulty with going up hills and getting up from the floor. Progressing  Pt will improve quad strength to 5/5 to ascend 1 flight of stairs reciprocally without UE assist. Progressing  Pt will increase hip and  knee strength to grossly 4+/5 to be able to get up and down from the floor safely   Pt will demonstrate increased hip ER/ABD strength to 5-/5 to perform stepping and squatting activities without excessive femoral IR/ADD. Progressing  Pt will improve SLS to 30s to improve safety with gait on uneven surfaces such as grass and gravel. Met  Pt will be independent and compliant with comprehensive HEP to maintain progress achieved in PT. Met    Rehab Potential: good    Plan: continue quad/glute and core progression as tolerated        Certification From: 6/10/2024  To:9/8/2024        Date: 6/3/2024  Tx#: 9/10 Date: 6/10/2024  Tx#: 10/10 6/19/2024  11/18   6/21/24:   12/18 6/24/2024 13/18 6/28/2024 14/18   TE: 40  NU step: L6 5'  B hamstring stretch with a strap TE: 38  NU step: L6 5'  B hamstring stretch with a strap Therapeutic Exercise:   Re-assessment-new to therapist, subj and obj  Therapeutic Exercise:   Straight leg bridge on ball 2 x 10   Therapeutic Exercise:   Short lever bridge 2 x 10   LTR x 8 ea  DKTC 20s x 3  Piriformis stretch 2 x 25s ea    Therapeutic Exercise:   Short lever bridge 2 x 13  LTR on ball x 8 ea   SLR 1.5# 2 x 15     SLR # 2 with hip ER x 10 L/R   Bridge with SB 2 x 10   Bridge with march 2 x 10 L/R  Quadruped 10 x 5 sec hip extensions L/R  Bird Dog x 5 L/R (limited by L knee pain in WBing)  Sit to stand x 10  B hip abduction in sdly 2 x 10 L/R (difficult)  B hip extension in prone 2 x 10 L/R   SLR # 2 with hip ER x 10 L/R   Bridge with SB 2 x 10   Bridge with march 2 x 10 L/R  Quadruped 10 x 5 sec hip extensions L/R  Bird Dog x 5 L/R (limited by L knee pain in WBing)  Sit to stand x 10  SLR with ER bias 15x2 ea   Glut max bridges x20   Figure 4 bridge 2 x 8   Abdominal brace hold with count for breath 5 x 5s  Abdominal brace sequential march 5 ea   LBW at feet 3 x 10   FSU 6\" step band for ER bias 10 ea   Hip hikes 10 ea    LBW RTB 3 x 10'   Hip hikes 12x ea   Step ups 12x ea 6\" step with  bias for ER/abd BTB  HEP Upgrade and review   Pallof press RTB 2 x 20      Manual Therapy  Leukotape for medial glide L knee  Medial glide with ball squats (takes away pain x 10, x 5  Patellar glides medial R G3 (reports feels good)  DLP with GTB 20x 50#  SLP 31# 2 x 10 ea   YTB 3 way hip 5x ea, unilateral UE support  YTB 3 way hip 5x2 ea, unilateral UE support   DLP with GTB 15x 50#, 15x 56#   Hip hikes 12x ea   Red TB lateral steps x 20 L/R  Double leg press 2 black and 1 yellow cords: x 30 reps; full range, Green TB for hip abduction  Single leg press 1 black and 1 yellow cords: x 30 R  1 black cord x 30 L Heel raises x 15 L/R  Re-assessment       Manual: 5'  KT application for proprioceptive feedback B  Manual Therapy  Patellar glides in mid ranges flexion superior and medial   Manual Therapy  Medial patellar glides in varying degrees of flexion B   Taping for medial glide leukotape L  x 9' total Manual Therapy  Medial patellar glides in varying degrees of flexion B   Instructed in self version    HEP:   Access Code: [email or web address]  Exercises  - Bridge with Heels on Swiss Ball  - 1 x daily - 7 x weekly - 2 sets - 10 reps  - Side Stepping with Resistance at Feet  - 1 x daily - 7 x weekly - 3 sets - 10 reps  - Hooklying Sequential Leg March and Lower  - 1 x daily - 7 x weekly - 2 sets - 10 reps  - Straight Leg Raise with External Rotation  - 1 x daily - 7 x weekly - 2 sets - 15 reps    LBP stretches: DKTC, LTR, piriformis stretch    Charges: TE x 2, man x 1       Total Timed Treatment: 40 min  Total Treatment Time: 40 min

## 2024-06-28 ENCOUNTER — OFFICE VISIT (OUTPATIENT)
Dept: PHYSICAL THERAPY | Facility: HOSPITAL | Age: 69
End: 2024-06-28
Attending: INTERNAL MEDICINE
Payer: MEDICARE

## 2024-06-28 PROCEDURE — 97110 THERAPEUTIC EXERCISES: CPT

## 2024-06-28 PROCEDURE — 97140 MANUAL THERAPY 1/> REGIONS: CPT

## 2024-07-01 ENCOUNTER — OFFICE VISIT (OUTPATIENT)
Dept: PHYSICAL THERAPY | Facility: HOSPITAL | Age: 69
End: 2024-07-01
Attending: INTERNAL MEDICINE
Payer: MEDICARE

## 2024-07-01 PROCEDURE — 97140 MANUAL THERAPY 1/> REGIONS: CPT

## 2024-07-01 PROCEDURE — 97110 THERAPEUTIC EXERCISES: CPT

## 2024-07-01 NOTE — PROGRESS NOTES
Subjective:  Had some soreness after last visit, mostly in butt muscles. Notes walking has gotten better on her knees-walked to [employer] on Sunday.     Pain: on stairs 5/10 knee pain     LEFS: 60 points, indicating mild to moderate functional limitations.     Assessment: Review of self medial glides with patient-once corrected no longer painful. Added in SL STS from raised mat height with BUE support to help functional gluteal and quad strength. Initially with difficulty with form, but improves with reps. Greater difficulty L compared to R. Discussed taper to 1x per week to allow HEP strengthening in between visits. Patient in agreement to plan.       Objective:   Relief of medial knee pain with medial glides by PT with ball squat (6/21/2024), also relief following taping  Observation/Posture: dynamic valgus R>L step ups (6/19/2024)    Knee and hip ROM full and pain-free B    Palpation: tenderness to palpation over left medial knee aspect. No longer with tenderness and stiffness along medial hamstrings B    Strength: (* denotes performed with pain)  (6/19/2024)     Hip abduction: R 4-/5; L 4-/5  Hip Extension: R 4-/5; L 4-/5  Glut max: R: 4-/5; L: 4-/5   Hip ER: R 4/5; L 4-/5 (from prior test date)  Hip IR: R 5/5; L 5/5(from prior test date)      Flexibility: (6/19/2024)   LE   Hip Flexor: R WNL, L WNL  Hamstrings: R 12, WNL; L 12, WNL   Piriformis: normal length; L normal length  Quads: R full length; L full length  Gastroc-soleus: R normal length; L normal length     30 sec STS:  (6/10/24) 6.5 reps (knee pain)    Goals: (to be met in 10 + 8 visits)   Pt will improve hamstrings length to normal to facilitate reduced pain and improved knee biomechanics. Met  Pt will improve  glut max strength to 4+/5 B to facilitate reduced difficulty with going up hills and getting up from the floor. Progressing  Pt will improve quad strength to 5/5 to ascend 1 flight of stairs reciprocally without UE assist. Progressing  Pt  will increase hip and knee strength to grossly 4+/5 to be able to get up and down from the floor safely   Pt will demonstrate increased hip ER/ABD strength to 5-/5 to perform stepping and squatting activities without excessive femoral IR/ADD. Progressing  Pt will improve SLS to 30s to improve safety with gait on uneven surfaces such as grass and gravel. Met  Pt will be independent and compliant with comprehensive HEP to maintain progress achieved in PT. Met    Rehab Potential: good    Plan: continue quad/glute and core progression as tolerated; HEP upgrade; taper to 1x/week        Certification From: 6/10/2024  To:9/8/2024        Date: 6/10/2024  Tx#: 10/10 6/19/2024  11/18   6/21/24:   12/18 6/24/2024  13/18 6/28/2024  14/18 7/1/2024  15/18   TE: 38  NU step: L6 5'  B hamstring stretch with a strap Therapeutic Exercise:   Re-assessment-new to therapist, subj and obj  Therapeutic Exercise:   Straight leg bridge on ball 2 x 10   Therapeutic Exercise:   Short lever bridge 2 x 10   LTR x 8 ea  DKTC 20s x 3  Piriformis stretch 2 x 25s ea    Therapeutic Exercise:   Short lever bridge 2 x 13  LTR on ball x 8 ea   SLR 1.5# 2 x 15   Therapeutic Exercise:   Short lever bridge 2 x 13  STS SL 24\" BUE 2 x 8  YTB 3 way hip 7x2 ea, unilateral UE support      B hip abduction in sdly 2 x 10 L/R (difficult)  B hip extension in prone 2 x 10 L/R   SLR # 2 with hip ER x 10 L/R   Bridge with SB 2 x 10   Bridge with march 2 x 10 L/R  Quadruped 10 x 5 sec hip extensions L/R  Bird Dog x 5 L/R (limited by L knee pain in WBing)  Sit to stand x 10  SLR with ER bias 15x2 ea   Glut max bridges x20   Figure 4 bridge 2 x 8   Abdominal brace hold with count for breath 5 x 5s  Abdominal brace sequential march 5 ea   LBW at feet 3 x 10   FSU 6\" step band for ER bias 10 ea   Hip hikes 10 ea    LBW RTB 3 x 10'   Hip hikes 12x ea   Step ups 12x ea 6\" step with bias for ER/abd BTB  HEP Upgrade and review   Pallof press RTB 2 x 20      Manual  Therapy  Leukotape for medial glide L knee  Medial glide with ball squats (takes away pain x 10, x 5  Patellar glides medial R G3 (reports feels good)  DLP with GTB 20x 50#  SLP 31# 2 x 10 ea   YTB 3 way hip 5x ea, unilateral UE support  YTB 3 way hip 5x2 ea, unilateral UE support   DLP with GTB 15x 50#, 15x 56#   Hip hikes 12x ea DLP 62# 2 x 15   Monster walks f/retro YTB 3 x 12'   Hip hikes 15x ea  Discussion POC        Heel raises x 15 L/R  Re-assessment         Manual Therapy  Patellar glides in mid ranges flexion superior and medial   Manual Therapy  Medial patellar glides in varying degrees of flexion B   Taping for medial glide leukotape L  x 9' total Manual Therapy  Medial patellar glides in varying degrees of flexion B   Instructed in self version  Manual Therapy  Medial patellar glides in varying degrees of flexion B   reviewed self version    HEP:   Access Code: [email or web address]  Exercises  - Bridge with Heels on Swiss Ball  - 1 x daily - 7 x weekly - 2 sets - 10 reps  - Side Stepping with Resistance at Feet  - 1 x daily - 7 x weekly - 3 sets - 10 reps  - Hooklying Sequential Leg March and Lower  - 1 x daily - 7 x weekly - 2 sets - 10 reps  - Straight Leg Raise with External Rotation  - 1 x daily - 7 x weekly - 2 sets - 15 reps    LBP stretches: DKTC, LTR, piriformis stretch    Charges: TE x 2(32'), man x 1   (7')    Total Timed Treatment: 39 min  Total Treatment Time: 39 min

## 2024-07-03 ENCOUNTER — APPOINTMENT (OUTPATIENT)
Dept: PHYSICAL THERAPY | Facility: HOSPITAL | Age: 69
End: 2024-07-03
Payer: MEDICARE

## 2024-07-09 ENCOUNTER — OFFICE VISIT (OUTPATIENT)
Dept: PHYSICAL THERAPY | Facility: HOSPITAL | Age: 69
End: 2024-07-09
Attending: INTERNAL MEDICINE
Payer: MEDICARE

## 2024-07-09 PROCEDURE — 97140 MANUAL THERAPY 1/> REGIONS: CPT

## 2024-07-09 PROCEDURE — 97110 THERAPEUTIC EXERCISES: CPT

## 2024-07-09 NOTE — PROGRESS NOTES
Subjective:  Reports exercises going okay. Reports had mild bruise by knee cap-just red, didn't go purple. Has not tried self gliding on L. Feels L side getting better but inside R getting worse inside of knee. Doing ball bridges, SLR without weight, sideways stepping, 3 way hip, and core exercises. 3 way hip is harder.     Pain: on stairs 5/10 knee pain     LEFS: 60 points, indicating mild to moderate functional limitations.     Assessment: Discussed need to make SLR harder to get benefit. Patient plans to look for ankle weights. Upgraded HEP to CLOF. Still with weakness noted at hips, core, quads, gluteals. Does demonstrate progress. Encouraged to try self patellar glides after prolonged sitting before standing to see if it helps with this pain.       Objective:  Mild hypomobility into medial glide  L (7/9/2024)    Observation/Posture: dynamic valgus R>L step ups (6/19/2024)    Strength: (* denotes performed with pain)  (6/19/2024)     Hip abduction: R 4-/5; L 4-/5  Hip Extension: R 4-/5; L 4-/5  Glut max: R: 4-/5; L: 4-/5   Hip ER: R 4/5; L 4-/5 (from prior test date)  Hip IR: R 5/5; L 5/5(from prior test date)      Flexibility: (6/19/2024)   LE   Hip Flexor: R WNL, L WNL  Hamstrings: R 12, WNL; L 12, WNL   Piriformis: normal length; L normal length  Quads: R full length; L full length  Gastroc-soleus: R normal length; L normal length     30 sec STS:  (6/10/24) 6.5 reps (knee pain)    Goals: (to be met in 10 + 8 visits)   Pt will improve hamstrings length to normal to facilitate reduced pain and improved knee biomechanics. Met  Pt will improve  glut max strength to 4+/5 B to facilitate reduced difficulty with going up hills and getting up from the floor. Progressing  Pt will improve quad strength to 5/5 to ascend 1 flight of stairs reciprocally without UE assist. Progressing  Pt will increase hip and knee strength to grossly 4+/5 to be able to get up and down from the floor safely   Pt will demonstrate  increased hip ER/ABD strength to 5-/5 to perform stepping and squatting activities without excessive femoral IR/ADD. Progressing  Pt will improve SLS to 30s to improve safety with gait on uneven surfaces such as grass and gravel. Met  Pt will be independent and compliant with comprehensive HEP to maintain progress achieved in PT. Met    Rehab Potential: good    Plan: continue quad/glute and core progression as tolerated; HEP upgrade; taper to 1x/week        Certification From: 6/10/2024  To:9/8/2024 6/19/2024 11/18 6/21/24:   12/18 6/24/2024  13/18 6/28/2024  14/18 7/1/2024  15/18 7/9/2024  16/18   Therapeutic Exercise:   Re-assessment-new to therapist, subj and obj  Therapeutic Exercise:   Straight leg bridge on ball 2 x 10   Therapeutic Exercise:   Short lever bridge 2 x 10   LTR x 8 ea  DKTC 20s x 3  Piriformis stretch 2 x 25s ea    Therapeutic Exercise:   Short lever bridge 2 x 13  LTR on ball x 8 ea   SLR 1.5# 2 x 15   Therapeutic Exercise:   Short lever bridge 2 x 13  STS SL 24\" BUE 2 x 8  YTB 3 way hip 7x2 ea, unilateral UE support    Therapeutic Exercise:   Figure 4 bridge 2 x 10  SLR 2.5# R 2 x 10,   SLR 2# L 2 x 10   STS SL 24\" BUE 2 x 8  YTB 3 way hip 7x2 ea, unilateral UE support    SLR with ER bias 15x2 ea   Glut max bridges x20   Figure 4 bridge 2 x 8   Abdominal brace hold with count for breath 5 x 5s  Abdominal brace sequential march 5 ea   LBW at feet 3 x 10   FSU 6\" step band for ER bias 10 ea   Hip hikes 10 ea    LBW RTB 3 x 10'   Hip hikes 12x ea   Step ups 12x ea 6\" step with bias for ER/abd BTB  HEP Upgrade and review   Pallof press RTB 2 x 20      Manual Therapy  Leukotape for medial glide L knee  Medial glide with ball squats (takes away pain x 10, x 5  Patellar glides medial R G3 (reports feels good)  DLP with GTB 20x 50#  SLP 31# 2 x 10 ea   YTB 3 way hip 5x ea, unilateral UE support  YTB 3 way hip 5x2 ea, unilateral UE support   DLP with GTB 15x 50#, 15x 56#   Hip hikes 12x ea  DLP 62# 2 x 15   Monster walks f/retro YTB 3 x 12'   Hip hikes 15x ea  Discussion POC      DLP 62# 2 x 15     Neuro Re-Ed:  Airex standing cone taps x 1'            Manual Therapy  Patellar glides in mid ranges flexion superior and medial   Manual Therapy  Medial patellar glides in varying degrees of flexion B   Taping for medial glide leukotape L  x 9' total Manual Therapy  Medial patellar glides in varying degrees of flexion B   Instructed in self version  Manual Therapy  Medial patellar glides in varying degrees of flexion B   reviewed self version     HEP:   Access Code: [email or web address]  - Figure 4 Bridge  - 1 x daily - 7 x weekly - 2 sets - 10 reps  - Hip Abduction with Resistance Loop  - 1 x daily - 7 x weekly - 2 sets - 6-8 reps  - Straight Leg Raise with External Rotation  - 1 x daily - 7 x weekly - 2 sets - 10-15 reps  - Hooklying Sequential Leg March and Lower  - 1 x daily - 7 x weekly - 2 sets - 10 reps    LBP stretches: DKTC, LTR, piriformis stretch    Charges: TE x 2(32'), man x 1   (6')    Total Timed Treatment: 39 min  Total Treatment Time: 39 min

## 2024-07-12 ENCOUNTER — APPOINTMENT (OUTPATIENT)
Dept: PHYSICAL THERAPY | Facility: HOSPITAL | Age: 69
End: 2024-07-12
Payer: MEDICARE

## 2024-07-19 ENCOUNTER — OFFICE VISIT (OUTPATIENT)
Dept: PHYSICAL THERAPY | Facility: HOSPITAL | Age: 69
End: 2024-07-19
Attending: INTERNAL MEDICINE
Payer: MEDICARE

## 2024-07-19 PROCEDURE — 97140 MANUAL THERAPY 1/> REGIONS: CPT

## 2024-07-19 PROCEDURE — 97110 THERAPEUTIC EXERCISES: CPT

## 2024-07-19 NOTE — PROGRESS NOTES
Subjective:  Reports feels she is a tinsy bit better. Notices less pain when getting off after sitting for meals. Still downstairs is harder than up.     Pain: on stairs \"some\" (did not rate) knee pain  with descent  No pain reported with ascent 6\" step     LEFS: 60 points, indicating mild to moderate functional limitations.     Assessment: Discussion POC. Seems need for continued quad, lateral hip, gluteal strength. As has improved, pain has gone down. Notices less pain overall, though can still get when walks fast on her longer walks or descent on stairs. Upgraded HEP to CLOF, instructed strength 3x per week and progressed ex to RTB. Will f/u in 3-4 weeks for assessment and progression as needed. Plan to DC at this time to full home program now that patient is good with form with yung Shankar in agreement to plan.       Objective:  Mild hypomobility into medial glide  L (7/9/2024)    Observation/Posture: dynamic valgus R>L step ups (6/19/2024)    Strength: (* denotes performed with pain)  (6/19/2024)     Hip abduction: R 4-/5; L 4-/5  Hip Extension: R 4-/5; L 4-/5  Glut max: R: 4-/5; L: 4-/5   Hip ER: R 4/5; L 4-/5 (from prior test date)  Hip IR: R 5/5; L 5/5(from prior test date)      Flexibility: (6/19/2024)   LE   Hip Flexor: R WNL, L WNL  Hamstrings: R 12, WNL; L 12, WNL   Piriformis: normal length; L normal length  Quads: R full length; L full length  Gastroc-soleus: R normal length; L normal length     30 sec STS:  (6/10/24) 6.5 reps (knee pain)    Goals: (to be met in 10 + 8 visits)   Pt will improve hamstrings length to normal to facilitate reduced pain and improved knee biomechanics. Met  Pt will improve  glut max strength to 4+/5 B to facilitate reduced difficulty with going up hills and getting up from the floor. Progressing  Pt will improve quad strength to 5/5 to ascend 1 flight of stairs reciprocally without UE assist. Progressing  Pt will increase hip and knee strength to grossly 4+/5 to be able  to get up and down from the floor safely   Pt will demonstrate increased hip ER/ABD strength to 5-/5 to perform stepping and squatting activities without excessive femoral IR/ADD. Progressing  Pt will improve SLS to 30s to improve safety with gait on uneven surfaces such as grass and gravel. Met  Pt will be independent and compliant with comprehensive HEP to maintain progress achieved in PT. Met    Rehab Potential: good    Plan: cf/u in 3-4 weeks, upgrade HEP and DC to HEP at this time        Certification From: 6/10/2024  To:9/8/2024 6/21/24:   12/18 6/24/2024  13/18 6/28/2024  14/18 7/1/2024  15/18 7/9/2024  16/18 7/19/2024  17/18   Therapeutic Exercise:   Straight leg bridge on ball 2 x 10   Therapeutic Exercise:   Short lever bridge 2 x 10   LTR x 8 ea  DKTC 20s x 3  Piriformis stretch 2 x 25s ea    Therapeutic Exercise:   Short lever bridge 2 x 13  LTR on ball x 8 ea   SLR 1.5# 2 x 15   Therapeutic Exercise:   Short lever bridge 2 x 13  STS SL 24\" BUE 2 x 8  YTB 3 way hip 7x2 ea, unilateral UE support    Therapeutic Exercise:   Figure 4 bridge 2 x 10  SLR 2.5# R 2 x 10,   SLR 2# L 2 x 10   STS SL 24\" BUE 2 x 8  YTB 3 way hip 7x2 ea, unilateral UE support  Therapeutic Exercise:   Discussion POC  SB bridge 2 x 12 longer lever    SLR with YTB 10 ea  Dead bug with SB 10 x 2   Forward/retro monster walk 2 laps YTB, 1 RTB lap  HEP upgrade and handout review   LBW RTB 3 x 10'   Hip hikes 12x ea   Step ups 12x ea 6\" step with bias for ER/abd BTB  HEP Upgrade and review   Pallof press RTB 2 x 20      Manual Therapy  Leukotape for medial glide L knee  Medial glide with ball squats (takes away pain x 10, x 5  Patellar glides medial R G3 (reports feels good)  DLP with GTB 20x 50#  SLP 31# 2 x 10 ea   YTB 3 way hip 5x ea, unilateral UE support  YTB 3 way hip 5x2 ea, unilateral UE support   DLP with GTB 15x 50#, 15x 56#   Hip hikes 12x ea DLP 62# 2 x 15   Monster walks f/retro YTB 3 x 12'   Hip hikes 15x  ea  Discussion POC      DLP 62# 2 x 15     Neuro Re-Ed:  Airex standing cone taps x 1'              Manual Therapy  Medial patellar glides in varying degrees of flexion B   Taping for medial glide leukotape L  x 9' total Manual Therapy  Medial patellar glides in varying degrees of flexion B   Instructed in self version  Manual Therapy  Medial patellar glides in varying degrees of flexion B   reviewed self version   Manual Therapy  Medial patellar glides in varying degrees L only    HEP:   Access Code: [email or web address]  Access Code: [email or web address]  URL: [email or web address]  Date: 07/19/2024  Prepared by: Lucy Trick    Exercises  - Bridge with Heels on Swiss Ball  - 1 x daily - 3 x weekly - 2 sets - 12 reps   or  - Figure 4 Bridge  - 1 x daily - 3 x weekly - 2 sets - 10 reps    - Forward Step Up  - 1 x daily - 2 x weekly - 2-3 sets - 10 reps  - Small Range Straight Leg Raise  - 1 x daily - 3 x weekly - 2 sets - 12-15 reps  - 3 way hip!  - 1 x daily - 3 x weekly - 2 sets - 6-8 reps    or  - Backward Monster Walks  - 1 x daily - 3 x weekly - 2-3 sets - 10 reps & Forward Monster Walks  - 1 x daily - 3 x weekly - 2-3 sets - 10 reps  - Isometric Dead Bug with Swiss Ball  - 1 x daily - 3 x weekly - 2 sets - 10 reps    LBP stretches: DKTC, LTR, piriformis stretch    Charges: TE x 2(35'), Manual x 1 (5;)  Total Timed Treatment: 40 min  Total Treatment Time: 40 min

## 2024-07-22 ENCOUNTER — OFFICE VISIT (OUTPATIENT)
Dept: ORTHOPEDICS CLINIC | Facility: CLINIC | Age: 69
End: 2024-07-22
Payer: MEDICARE

## 2024-07-22 VITALS — BODY MASS INDEX: 19.99 KG/M2 | WEIGHT: 120 LBS | HEIGHT: 65 IN

## 2024-07-22 DIAGNOSIS — M77.8 TENDONITIS OF FINGER: Primary | ICD-10-CM

## 2024-07-22 PROCEDURE — 99204 OFFICE O/P NEW MOD 45 MIN: CPT | Performed by: ORTHOPAEDIC SURGERY
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
## 2024-07-26 ENCOUNTER — APPOINTMENT (OUTPATIENT)
Dept: PHYSICAL THERAPY | Facility: HOSPITAL | Age: 69
End: 2024-07-26
Payer: MEDICARE

## 2024-08-01 ENCOUNTER — APPOINTMENT (OUTPATIENT)
Dept: PHYSICAL THERAPY | Facility: HOSPITAL | Age: 69
End: 2024-08-01
Payer: MEDICARE

## 2024-08-07 ENCOUNTER — OFFICE VISIT (OUTPATIENT)
Dept: PODIATRY CLINIC | Facility: CLINIC | Age: 69
End: 2024-08-07

## 2024-08-07 DIAGNOSIS — M47.26 OSTEOARTHRITIS OF SPINE WITH RADICULOPATHY, LUMBAR REGION: ICD-10-CM

## 2024-08-07 DIAGNOSIS — E11.9 TYPE 2 DIABETES MELLITUS WITHOUT COMPLICATION, UNSPECIFIED WHETHER LONG TERM INSULIN USE (HCC): ICD-10-CM

## 2024-08-07 DIAGNOSIS — L60.3 NAIL DYSTROPHY: ICD-10-CM

## 2024-08-07 DIAGNOSIS — B35.1 ONYCHOMYCOSIS: Primary | ICD-10-CM

## 2024-08-07 DIAGNOSIS — M20.41 HAMMER TOE OF RIGHT FOOT: ICD-10-CM

## 2024-08-07 DIAGNOSIS — M48.061 LUMBAR FORAMINAL STENOSIS: ICD-10-CM

## 2024-08-07 PROCEDURE — 99213 OFFICE O/P EST LOW 20 MIN: CPT | Performed by: STUDENT IN AN ORGANIZED HEALTH CARE EDUCATION/TRAINING PROGRAM
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
## 2024-08-08 ENCOUNTER — APPOINTMENT (OUTPATIENT)
Dept: PHYSICAL THERAPY | Facility: HOSPITAL | Age: 69
End: 2024-08-08
Payer: MEDICARE

## 2024-08-12 NOTE — PROGRESS NOTES
Discharge Summary  Pt has attended 18 visits in Physical Therapy.       Subjective:  Notices she is doing much better, can go up and down the stairs without pain most of the time. Does notice if makes really sharp turn on knees can go up to a 3/10. Feels she will continue to get better doing exercises. Feels good about it.     Pain:   At most in last 2 weeks: 3/10 (with turning)  On stairs: 1/10       Assessment: Returns to therapy after 1 month of HEP only. Feels she is doing much better and feels she is stronger. She can now go up the stairs and down the stairs without pain and overall has less pain. Notes with sharp pivot turn can get 3/10 pain and if she walks fast for a while or does multiple flights of stairs while carrying laundry then she can be sore in the knees. Discussed it is reasonable that with continued HEP performance, these will continue to improve. Objectively, LEFS score improved 23%, MMT also improved, and 30s STS to painfree and 10. Added SLS to HEP to help improve stability with turning as well as STS ex for strengthening. Vonnie agreement to discharge today and all patient questions answered.       Objective:    Accessory motion (patella): R WNL, L medial WNL, superior mild restriction but then WNL following a few mobilizations    Strength: (* denotes performed with pain)  8/13/2024 (6/19/2024)   Hip abduction: R 4/5; L 4/5  Hip Extension: R 4-/5; L 4-/5  Hip ER: R 4+/5; L 4/5  Hip abduction: R 4-/5; L 4-/5  Hip Extension: R 4-/5; L 4-/5  Glut max: R: 4-/5; L: 4-/5   Hip ER: R 4/5; L 4-/5 (from prior test date)  Hip IR: R 5/5; L 5/5(from prior test date)          Functional Mobility:  30 sec sit<>stand:   (8/13/2024): 10   (6/10/24: 6.5 reps knee pain    [Below average score indicates high risk for falls; norms by age > or = to...   65-70 y/o Men: 12, Women: 11        Goals: (to be met in 10 + 8 visits)   Pt will improve hamstrings length to normal to facilitate reduced pain and improved  knee biomechanics. Met  Pt will improve  glut max strength to 4+/5 B to facilitate reduced difficulty with going up hills and getting up from the floor. 75% MET)   Pt will improve quad strength to 5/5 to ascend 1 flight of stairs reciprocally without UE assist. MET  Pt will increase hip and knee strength to grossly 4+/5 to be able to get up and down from the floor safely (75% MET)   Pt will demonstrate increased hip ER/ABD strength to 5-/5 to perform stepping and squatting activities without excessive femoral IR/ADD. 75 % MET  Pt will improve SLS to 30s to improve safety with gait on uneven surfaces such as grass and gravel. Met  Pt will be independent and compliant with comprehensive HEP to maintain progress achieved in PT. Met    Rehab Potential: good    LEFS Score  LEFS Score: 60 % (6/10/2024 10:09 AM)    Post LEFS Score  Post LEFS Score: 83.75 % (8/13/2024 10:25 AM)    23.75 % improvement    Plan: Discharge from formal PT, continue HEP. Follow-up with physician if symptoms worsen or do not continue to improve.     Patient/Family/Caregiver was advised of these findings, precautions, and treatment options and has agreed to actively participate in planning and for this course of care.    Thank you for your referral. If you have any questions, please contact me at Dept: [phone].    Sincerely,  Electronically signed by therapist: Lucy Mcgregor PT     Physician's certification required:  No    Certification From: 8/12/2024  To:11/10/2024                 6/21/24:   12/18 6/24/2024  13/18 6/28/2024  14/18 7/1/2024  15/18 7/9/2024  16/18 7/19/2024  17/18 8/13/2024 18/18     Therapeutic Exercise:   Straight leg bridge on ball 2 x 10   Therapeutic Exercise:   Short lever bridge 2 x 10   LTR x 8 ea  DKTC 20s x 3  Piriformis stretch 2 x 25s ea    Therapeutic Exercise:   Short lever bridge 2 x 13  LTR on ball x 8 ea   SLR 1.5# 2 x 15   Therapeutic Exercise:   Short lever bridge 2 x 13  STS SL 24\" BUE 2 x 8  YTB 3 way hip  7x2 ea, unilateral UE support    Therapeutic Exercise:   Figure 4 bridge 2 x 10  SLR 2.5# R 2 x 10,   SLR 2# L 2 x 10   STS SL 24\" BUE 2 x 8  YTB 3 way hip 7x2 ea, unilateral UE support  Therapeutic Exercise:   Discussion POC  SB bridge 2 x 12 longer lever    SLR with YTB 10 ea  Dead bug with SB 10 x 2   Forward/retro monster walk 2 laps YTB, 1 RTB lap  HEP upgrade and handout review Therapeutic Exercise:   Subj hx  Re-assessment (see objective)  Patient questions: regarding band placement with ex, type of band, recommended looped bands for future  Discussion POC  HEP upgrade with handout issued    Manual Therapy  Brief superior mobilizations L patella G3 x 2'   LBW RTB 3 x 10'   Hip hikes 12x ea   Step ups 12x ea 6\" step with bias for ER/abd BTB  HEP Upgrade and review   Pallof press RTB 2 x 20      Manual Therapy  Leukotape for medial glide L knee  Medial glide with ball squats (takes away pain x 10, x 5  Patellar glides medial R G3 (reports feels good)  DLP with GTB 20x 50#  SLP 31# 2 x 10 ea   YTB 3 way hip 5x ea, unilateral UE support  YTB 3 way hip 5x2 ea, unilateral UE support   DLP with GTB 15x 50#, 15x 56#   Hip hikes 12x ea DLP 62# 2 x 15   Monster walks f/retro YTB 3 x 12'   Hip hikes 15x ea  Discussion POC      DLP 62# 2 x 15     Neuro Re-Ed:  Airex standing cone taps x 1'   Neuro Re-Ed:  SLS trials and discussion progression             Manual Therapy  Medial patellar glides in varying degrees of flexion B   Taping for medial glide leukotape L  x 9' total Manual Therapy  Medial patellar glides in varying degrees of flexion B   Instructed in self version  Manual Therapy  Medial patellar glides in varying degrees of flexion B   reviewed self version   Manual Therapy  Medial patellar glides in varying degrees L only     HEP:   Access Code: [email or web address]  - Bridge with Heels on Swiss Ball  - 1 x daily - 3 x weekly - 2 sets - 12 reps  - Figure 4 Bridge  - 1 x daily - 3 x weekly - 2 sets - 10 reps  - Forward Step  Up  - 1 x daily - 2 x weekly - 2-3 sets - 10 reps  - Small Range Straight Leg Raise  - 1 x daily - 3 x weekly - 2 sets - 12-15 reps  - 3 way hip!  - 1 x daily - 3 x weekly - 2 sets - 6-8 reps  - Backward Monster Walks  - 1 x daily - 3 x weekly - 2-3 sets - 10 reps  - Forward Monster Walks  - 1 x daily - 3 x weekly - 2-3 sets - 10 reps  - Isometric Dead Bug with Swiss Ball  - 1 x daily - 3 x weekly - 2 sets - 10 reps  - Sit to Stand with Arms Crossed  - 1 x daily - 3 x weekly - 2 sets - 10 reps      LBP stretches: DKTC, LTR, piriformis stretch    Charges: TE x 2(32), Manual x 0 (3;); NR x 1 (5')  Total Timed Treatment: 40 min  Total Treatment Time: 40 min

## 2024-08-13 ENCOUNTER — OFFICE VISIT (OUTPATIENT)
Dept: PHYSICAL THERAPY | Facility: HOSPITAL | Age: 69
End: 2024-08-13
Attending: INTERNAL MEDICINE
Payer: MEDICARE

## 2024-08-13 PROCEDURE — 97110 THERAPEUTIC EXERCISES: CPT

## 2024-08-13 PROCEDURE — 97112 NEUROMUSCULAR REEDUCATION: CPT

## 2024-08-21 ENCOUNTER — NURSE TRIAGE (OUTPATIENT)
Dept: FAMILY MEDICINE CLINIC | Facility: CLINIC | Age: 69
End: 2024-08-21
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
## 2024-08-21 NOTE — TELEPHONE ENCOUNTER
Action Requested: Summary for Provider     []  Critical Lab, Recommendations Needed  [] Need Additional Advice  [x]   FYI    []   Need Orders  [] Need Medications Sent to Pharmacy  []  Other     SUMMARY: Pt scheduled for OV tomorrow with NP for evaluation of pimple-like rash to neck/chest area.  ED/UC precautions discussed if symptoms worsen, pt verbalized understanding.    Reason for call: Sick Call  Onset: Data Unavailable               Notes:  - Noticed a pimple-like site to the front/middle of her neck a few days ago.  - The next day there were 2 sites, small w/white head, was able to express white discharge.  - The following day (yesterday) there were 5 sites to pt's chest now.  - Denies itching/fever.    - No new changes to medications/lotions/soaps/creams, etc.   - Unable to think of what could have caused this.    Reason for Disposition   Patient wants to be seen    Answer Assessment - Initial Assessment Questions  1. APPEARANCE of RASH: \"Describe the rash.\"       5 little dots on chest  2. LOCATION: \"Where is the rash located?\"       chest  3. NUMBER: \"How many spots are there?\"       5  4. SIZE: \"How big are the spots?\" (Inches, centimeters or compare to size of a coin)       Size of point of pen  5. ONSET: \"When did the rash start?\"       Yesterday 8/20  6. ITCHING: \"Does the rash itch?\" If Yes, ask: \"How bad is the itch?\"  (Scale 0-10; or none, mild, moderate, severe)      Denies  7. PAIN: \"Does the rash hurt?\" If Yes, ask: \"How bad is the pain?\"  (Scale 0-10; or none, mild, moderate, severe)     - NONE (0): no pain     - MILD (1-3): doesn't interfere with normal activities      - MODERATE (4-7): interferes with normal activities or awakens from sleep      - SEVERE (8-10): excruciating pain, unable to do any normal activities      Mild  8. OTHER SYMPTOMS: \"Do you have any other symptoms?\" (e.g., fever)      Denies  9. PREGNANCY: \"Is there any chance you are pregnant?\" \"When was your last menstrual  period?\"      Denies    Protocols used: Rash or Redness - [id number]

## 2024-08-22 ENCOUNTER — OFFICE VISIT (OUTPATIENT)
Dept: INTERNAL MEDICINE CLINIC | Facility: CLINIC | Age: 69
End: 2024-08-22
Payer: MEDICARE

## 2024-08-22 ENCOUNTER — OFFICE VISIT (OUTPATIENT)
Dept: HEMATOLOGY/ONCOLOGY | Facility: HOSPITAL | Age: 69
End: 2024-08-22
Attending: INTERNAL MEDICINE
Payer: MEDICARE

## 2024-08-22 VITALS
HEART RATE: 75 BPM | WEIGHT: 119 LBS | BODY MASS INDEX: 19.83 KG/M2 | DIASTOLIC BLOOD PRESSURE: 81 MMHG | HEIGHT: 65 IN | SYSTOLIC BLOOD PRESSURE: 133 MMHG | OXYGEN SATURATION: 98 % | TEMPERATURE: 97 F | RESPIRATION RATE: 16 BRPM

## 2024-08-22 VITALS
DIASTOLIC BLOOD PRESSURE: 60 MMHG | BODY MASS INDEX: 19.99 KG/M2 | OXYGEN SATURATION: 97 % | WEIGHT: 120 LBS | HEART RATE: 71 BPM | SYSTOLIC BLOOD PRESSURE: 90 MMHG | HEIGHT: 65 IN | TEMPERATURE: 97 F

## 2024-08-22 DIAGNOSIS — R73.03 PREDIABETES: ICD-10-CM

## 2024-08-22 DIAGNOSIS — E03.8 SUBCLINICAL HYPOTHYROIDISM: ICD-10-CM

## 2024-08-22 DIAGNOSIS — L72.0 MILIA: Primary | ICD-10-CM

## 2024-08-22 DIAGNOSIS — C49.A2 GASTROINTESTINAL STROMAL TUMOR (GIST) OF STOMACH (HCC): Primary | ICD-10-CM

## 2024-08-22 LAB
ALBUMIN SERPL-MCNC: 4.6 G/DL (ref 3.2–4.8)
ALBUMIN/GLOB SERPL: 1.7 {RATIO} (ref 1–2)
ALP LIVER SERPL-CCNC: 87 U/L
ALT SERPL-CCNC: 23 U/L
ANION GAP SERPL CALC-SCNC: 3 MMOL/L (ref 0–18)
AST SERPL-CCNC: 23 U/L (ref ?–34)
BASOPHILS # BLD AUTO: 0.02 X10(3) UL (ref 0–0.2)
BASOPHILS NFR BLD AUTO: 0.4 %
BILIRUB SERPL-MCNC: 0.6 MG/DL (ref 0.2–1.1)
BUN BLD-MCNC: 21 MG/DL (ref 9–23)
CALCIUM BLD-MCNC: 9.9 MG/DL (ref 8.7–10.4)
CHLORIDE SERPL-SCNC: 106 MMOL/L (ref 98–112)
CO2 SERPL-SCNC: 31 MMOL/L (ref 21–32)
CREAT BLD-MCNC: 0.64 MG/DL
EGFRCR SERPLBLD CKD-EPI 2021: 96 ML/MIN/1.73M2 (ref 60–?)
EOSINOPHIL # BLD AUTO: 0.02 X10(3) UL (ref 0–0.7)
EOSINOPHIL NFR BLD AUTO: 0.4 %
ERYTHROCYTE [DISTWIDTH] IN BLOOD BY AUTOMATED COUNT: 13.2 %
GLOBULIN PLAS-MCNC: 2.7 G/DL (ref 2–3.5)
GLUCOSE BLD-MCNC: 123 MG/DL (ref 70–99)
HCT VFR BLD AUTO: 40.4 %
HGB BLD-MCNC: 13.7 G/DL
IMM GRANULOCYTES # BLD AUTO: 0.02 X10(3) UL (ref 0–1)
IMM GRANULOCYTES NFR BLD: 0.4 %
LYMPHOCYTES # BLD AUTO: 1.5 X10(3) UL (ref 1–4)
LYMPHOCYTES NFR BLD AUTO: 30.1 %
MCH RBC QN AUTO: 29.6 PG (ref 26–34)
MCHC RBC AUTO-ENTMCNC: 33.9 G/DL (ref 31–37)
MCV RBC AUTO: 87.3 FL
MONOCYTES # BLD AUTO: 0.32 X10(3) UL (ref 0.1–1)
MONOCYTES NFR BLD AUTO: 6.4 %
NEUTROPHILS # BLD AUTO: 3.1 X10 (3) UL (ref 1.5–7.7)
NEUTROPHILS # BLD AUTO: 3.1 X10(3) UL (ref 1.5–7.7)
NEUTROPHILS NFR BLD AUTO: 62.3 %
OSMOLALITY SERPL CALC.SUM OF ELEC: 294 MOSM/KG (ref 275–295)
PLATELET # BLD AUTO: 151 10(3)UL (ref 150–450)
POTASSIUM SERPL-SCNC: 4.3 MMOL/L (ref 3.5–5.1)
PROT SERPL-MCNC: 7.3 G/DL (ref 5.7–8.2)
RBC # BLD AUTO: 4.63 X10(6)UL
SODIUM SERPL-SCNC: 140 MMOL/L (ref 136–145)
T4 FREE SERPL-MCNC: 1 NG/DL (ref 0.8–1.7)
TSI SER-ACNC: 5.55 MIU/ML (ref 0.55–4.78)
WBC # BLD AUTO: 5 X10(3) UL (ref 4–11)

## 2024-08-22 PROCEDURE — 99215 OFFICE O/P EST HI 40 MIN: CPT | Performed by: INTERNAL MEDICINE

## 2024-08-22 PROCEDURE — 99213 OFFICE O/P EST LOW 20 MIN: CPT
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
## 2024-08-22 NOTE — PROGRESS NOTES
Patient here for follow-up.  Energy levels are improving. States she has new skin tag like spots on her chest, neck, and collarbone. Met with dermatology a few months ago. Denies any additional symptoms at this time.

## 2024-08-23 DIAGNOSIS — E03.8 SUBCLINICAL HYPOTHYROIDISM: Primary | ICD-10-CM

## 2024-08-23 LAB
EST. AVERAGE GLUCOSE BLD GHB EST-MCNC: 108 MG/DL (ref 68–126)
HBA1C MFR BLD: 5.4 % (ref ?–5.7)

## 2024-09-04 ENCOUNTER — LAB ENCOUNTER (OUTPATIENT)
Dept: LAB | Age: 69
End: 2024-09-04
Attending: STUDENT IN AN ORGANIZED HEALTH CARE EDUCATION/TRAINING PROGRAM
Payer: MEDICARE

## 2024-09-04 DIAGNOSIS — E03.8 SUBCLINICAL HYPOTHYROIDISM: ICD-10-CM

## 2024-09-04 DIAGNOSIS — M85.89 OSTEOPENIA OF MULTIPLE SITES: ICD-10-CM

## 2024-09-04 LAB
ALBUMIN SERPL-MCNC: 4.7 G/DL (ref 3.2–4.8)
ANION GAP SERPL CALC-SCNC: 4 MMOL/L (ref 0–18)
BUN BLD-MCNC: 16 MG/DL (ref 9–23)
CALCIUM BLD-MCNC: 10.2 MG/DL (ref 8.7–10.4)
CHLORIDE SERPL-SCNC: 103 MMOL/L (ref 98–112)
CO2 SERPL-SCNC: 29 MMOL/L (ref 21–32)
CREAT BLD-MCNC: 0.7 MG/DL
EGFRCR SERPLBLD CKD-EPI 2021: 94 ML/MIN/1.73M2 (ref 60–?)
GLUCOSE BLD-MCNC: 86 MG/DL (ref 70–99)
OSMOLALITY SERPL CALC.SUM OF ELEC: 282 MOSM/KG (ref 275–295)
PHOSPHATE SERPL-MCNC: 3.8 MG/DL (ref 2.4–5.1)
POTASSIUM SERPL-SCNC: 4.2 MMOL/L (ref 3.5–5.1)
PTH-INTACT SERPL-MCNC: 29 PG/ML (ref 18.5–88)
SODIUM SERPL-SCNC: 136 MMOL/L (ref 136–145)
T4 FREE SERPL-MCNC: 1 NG/DL (ref 0.8–1.7)
TSI SER-ACNC: 6.22 MIU/ML (ref 0.55–4.78)
VIT D+METAB SERPL-MCNC: 56.9 NG/ML (ref 30–100)

## 2024-09-04 PROCEDURE — 80069 RENAL FUNCTION PANEL: CPT

## 2024-09-04 PROCEDURE — 84439 ASSAY OF FREE THYROXINE: CPT

## 2024-09-04 PROCEDURE — 84443 ASSAY THYROID STIM HORMONE: CPT

## 2024-09-04 PROCEDURE — 83970 ASSAY OF PARATHORMONE: CPT

## 2024-09-04 PROCEDURE — 36415 COLL VENOUS BLD VENIPUNCTURE: CPT

## 2024-09-04 PROCEDURE — 82306 VITAMIN D 25 HYDROXY: CPT

## 2024-09-13 ENCOUNTER — HOSPITAL ENCOUNTER (OUTPATIENT)
Age: 69
Discharge: HOME | End: 2024-09-13
Attending: EMERGENCY MEDICINE
Payer: MEDICARE

## 2024-09-13 ENCOUNTER — APPOINTMENT (OUTPATIENT)
Dept: GENERAL RADIOLOGY | Age: 69
End: 2024-09-13
Attending: EMERGENCY MEDICINE
Payer: MEDICARE

## 2024-09-13 VITALS
BODY MASS INDEX: 20.14 KG/M2 | WEIGHT: 118 LBS | OXYGEN SATURATION: 97 % | HEIGHT: 64 IN | TEMPERATURE: 98 F | RESPIRATION RATE: 20 BRPM | SYSTOLIC BLOOD PRESSURE: 146 MMHG | HEART RATE: 71 BPM | DIASTOLIC BLOOD PRESSURE: 84 MMHG

## 2024-09-13 DIAGNOSIS — S91.209A AVULSION OF TOENAIL, INITIAL ENCOUNTER: Primary | ICD-10-CM

## 2024-09-13 PROCEDURE — 73660 X-RAY EXAM OF TOE(S): CPT | Performed by: EMERGENCY MEDICINE

## 2024-09-13 PROCEDURE — 99213 OFFICE O/P EST LOW 20 MIN: CPT

## 2024-09-13 PROCEDURE — 11730 AVULSION NAIL PLATE SIMPLE 1: CPT

## 2024-09-13 RX ORDER — IBUPROFEN 400 MG/1
400 TABLET, FILM COATED ORAL EVERY 8 HOURS PRN
Qty: 30 TABLET | Refills: 0 | Status: SHIPPED | OUTPATIENT
Start: 2024-09-13 | End: 2024-09-20
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
## 2024-09-13 NOTE — DISCHARGE INSTRUCTIONS
Follow-up with podiatry.  Call to make a follow-up appointment  Apply light application of bacitracin ointment before applying dressing  Change your dressings daily.  Take ibuprofen as needed for pain

## 2024-09-16 ENCOUNTER — TELEPHONE (OUTPATIENT)
Dept: PODIATRY CLINIC | Facility: CLINIC | Age: 69
End: 2024-09-16

## 2024-09-16 NOTE — TELEPHONE ENCOUNTER
Per patient was in the emergency room 9/13 for pain in her toenail and needs to follow up, no appointment until November. Please advise

## 2024-09-16 NOTE — TELEPHONE ENCOUNTER
Patient returning call. Patient will be unavailable today from 10:30am to 11:30am due to other doctor appointment.  Please call thank you.

## 2024-09-16 NOTE — TELEPHONE ENCOUNTER
Spoke with patient and she states on 9/13/24 she kicked her  and injured her left 1st toe. She went to [facility] and had XR and states the nail was removed and needs Follow up. Offered appointment on 9/18 at 915am with Dr Rivera for ER Follow up. Address provided.

## 2024-09-18 ENCOUNTER — LAB ENCOUNTER (OUTPATIENT)
Dept: LAB | Age: 69
End: 2024-09-18
Attending: INTERNAL MEDICINE
Payer: MEDICARE

## 2024-09-18 ENCOUNTER — OFFICE VISIT (OUTPATIENT)
Facility: LOCATION | Age: 69
End: 2024-09-18
Payer: MEDICARE

## 2024-09-18 ENCOUNTER — TELEPHONE (OUTPATIENT)
Dept: INTERNAL MEDICINE CLINIC | Facility: CLINIC | Age: 69
End: 2024-09-18

## 2024-09-18 DIAGNOSIS — Z98.890 STATUS POST NAIL SURGERY: ICD-10-CM

## 2024-09-18 DIAGNOSIS — Z01.84 IMMUNITY STATUS TESTING: ICD-10-CM

## 2024-09-18 DIAGNOSIS — Z01.84 IMMUNITY STATUS TESTING: Primary | ICD-10-CM

## 2024-09-18 DIAGNOSIS — M79.675 GREAT TOE PAIN, LEFT: ICD-10-CM

## 2024-09-18 DIAGNOSIS — S99.922A INJURY OF TOENAIL OF LEFT FOOT, INITIAL ENCOUNTER: Primary | ICD-10-CM

## 2024-09-18 LAB
HAV AB SER QL IA: REACTIVE
HAV IGM SER QL: NONREACTIVE

## 2024-09-18 PROCEDURE — 86709 HEPATITIS A IGM ANTIBODY: CPT

## 2024-09-18 PROCEDURE — 99213 OFFICE O/P EST LOW 20 MIN: CPT | Performed by: PODIATRIST

## 2024-09-18 PROCEDURE — 86708 HEPATITIS A ANTIBODY: CPT

## 2024-09-18 PROCEDURE — 36415 COLL VENOUS BLD VENIPUNCTURE: CPT

## 2024-09-18 NOTE — TELEPHONE ENCOUNTER
Called and spoke to pt. Informed her Hep A titers were ordered. She states she will get them drawn today.

## 2024-09-18 NOTE — TELEPHONE ENCOUNTER
Pt called. She states she is traveling to [place] on 10/4/24 and she was advised to get hepatitis A immunization prior to travel. Pt is asking if she should get immunization or if she should have titers done first as she is unsure if she has had immunization in the past?     Routing to Dr. Reynolds for review and recommendations
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
## 2024-09-19 ENCOUNTER — TELEPHONE (OUTPATIENT)
Dept: INTERNAL MEDICINE CLINIC | Facility: CLINIC | Age: 69
End: 2024-09-19

## 2024-09-19 NOTE — TELEPHONE ENCOUNTER
Pt called requesting for Hep A results  Pt states she is to travel to [place] in 2 weeks   Please advise

## 2024-09-26 ENCOUNTER — OFFICE VISIT (OUTPATIENT)
Facility: CLINIC | Age: 69
End: 2024-09-26
Payer: MEDICARE

## 2024-09-26 VITALS
SYSTOLIC BLOOD PRESSURE: 132 MMHG | WEIGHT: 118 LBS | HEART RATE: 82 BPM | OXYGEN SATURATION: 98 % | BODY MASS INDEX: 20.14 KG/M2 | DIASTOLIC BLOOD PRESSURE: 74 MMHG | HEIGHT: 64 IN

## 2024-09-26 DIAGNOSIS — M85.89 OSTEOPENIA OF MULTIPLE SITES: Primary | ICD-10-CM

## 2024-09-26 DIAGNOSIS — E03.8 SUBCLINICAL HYPOTHYROIDISM: ICD-10-CM

## 2024-09-26 PROCEDURE — 99215 OFFICE O/P EST HI 40 MIN: CPT | Performed by: STUDENT IN AN ORGANIZED HEALTH CARE EDUCATION/TRAINING PROGRAM
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
## 2024-09-26 NOTE — PATIENT INSTRUCTIONS
Return Visit   [ x ] Physician in 6 months   [  ] In person or video visit  [  ] In person only    [ x ] After visit summary   [ x ] Placed labs/imaging. Labs are to be drawn at 8A and fasting     It was great seeing you today!    Today we discussed your bone health and thyroid:  -Please repeat your labs in 6 months (3/1 onwards). We will follow up thereafter  -Continue your calcium 500 mg twice daily and vitamin D daily    Take care!  -Dr. Conway

## 2024-09-30 ENCOUNTER — TELEPHONE (OUTPATIENT)
Dept: INTERNAL MEDICINE CLINIC | Facility: CLINIC | Age: 69
End: 2024-09-30

## 2024-09-30 NOTE — TELEPHONE ENCOUNTER
Patient states that she has had about 9 headaches over the past month.   She does take 2 ibuprofen and that does help.   She has pain above her ear- her forehead and pressure on her teeth.   She does take sudafed which helped a little.   She has clear nasal drainage.

## 2024-10-02 ENCOUNTER — OFFICE VISIT (OUTPATIENT)
Facility: LOCATION | Age: 69
End: 2024-10-02
Payer: MEDICARE

## 2024-10-02 DIAGNOSIS — Z98.890 STATUS POST NAIL SURGERY: Primary | ICD-10-CM

## 2024-10-02 PROCEDURE — 99212 OFFICE O/P EST SF 10 MIN: CPT | Performed by: PODIATRIST
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
## 2024-10-03 NOTE — TELEPHONE ENCOUNTER
Attempted to call pt. Left voicemail to call back     Called and spoke with pt's son Bridger. Notified AD tried calling as well as myself and had to leave her a voicemail. He stated he will connect with the pt and ensure she calls for symptom update. Son is very appreciative of follow up call and will have pt call us.

## 2024-10-03 NOTE — TELEPHONE ENCOUNTER
Patient called with symptom update  Patient took Claritin yesterday and today, notes that her headache is improving  She has added 1 ibuprofen 200 mg, this is also helping.   Headache is not as severe as before, but it is still present, describes this more as a pressure.   Patient notes she is going out of town tomorrow, she is taking Claritin with her.    Please call patient today if any new orders

## 2024-10-15 ENCOUNTER — OFFICE VISIT (OUTPATIENT)
Dept: INTERNAL MEDICINE CLINIC | Facility: CLINIC | Age: 69
End: 2024-10-15
Payer: MEDICARE

## 2024-10-15 VITALS
HEIGHT: 64 IN | OXYGEN SATURATION: 99 % | DIASTOLIC BLOOD PRESSURE: 80 MMHG | TEMPERATURE: 97 F | BODY MASS INDEX: 20.49 KG/M2 | HEART RATE: 68 BPM | SYSTOLIC BLOOD PRESSURE: 118 MMHG | WEIGHT: 120 LBS

## 2024-10-15 DIAGNOSIS — F41.9 ANXIETY: ICD-10-CM

## 2024-10-15 DIAGNOSIS — G44.209 TENSION HEADACHE: Primary | ICD-10-CM

## 2024-10-15 DIAGNOSIS — Z12.31 ENCOUNTER FOR SCREENING MAMMOGRAM FOR MALIGNANT NEOPLASM OF BREAST: ICD-10-CM

## 2024-10-15 PROCEDURE — G2211 COMPLEX E/M VISIT ADD ON: HCPCS | Performed by: INTERNAL MEDICINE

## 2024-10-15 PROCEDURE — 99214 OFFICE O/P EST MOD 30 MIN: CPT | Performed by: INTERNAL MEDICINE

## 2024-10-16 ENCOUNTER — TELEPHONE (OUTPATIENT)
Dept: INTERNAL MEDICINE CLINIC | Facility: CLINIC | Age: 69
End: 2024-10-16
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
## 2024-10-16 NOTE — TELEPHONE ENCOUNTER
RN to pt call, unable to leave detailed VM on unidentified mailbox and per NADJA.  Advised a [facility] message will be sent, to check her [facility] account and callback the office if she has any questions after viewing the message (LL 10/4/24).

## 2024-10-16 NOTE — TELEPHONE ENCOUNTER
Patient came in yesterday and saw Dr Orion Reynolds. She stated he suggested to take Magnesium Glycinate 30min before bedtime. Patient wants clarification on dosage amount.

## 2024-10-24 ENCOUNTER — APPOINTMENT (OUTPATIENT)
Dept: GENERAL RADIOLOGY | Age: 69
End: 2024-10-24
Attending: NURSE PRACTITIONER
Payer: MEDICARE

## 2024-10-24 ENCOUNTER — NURSE TRIAGE (OUTPATIENT)
Dept: INTERNAL MEDICINE CLINIC | Facility: CLINIC | Age: 69
End: 2024-10-24

## 2024-10-24 ENCOUNTER — HOSPITAL ENCOUNTER (OUTPATIENT)
Age: 69
Discharge: HOME | End: 2024-10-24
Payer: MEDICARE

## 2024-10-24 VITALS
HEART RATE: 88 BPM | TEMPERATURE: 98 F | DIASTOLIC BLOOD PRESSURE: 67 MMHG | RESPIRATION RATE: 16 BRPM | OXYGEN SATURATION: 97 % | SYSTOLIC BLOOD PRESSURE: 134 MMHG

## 2024-10-24 DIAGNOSIS — M25.562 ACUTE PAIN OF BOTH KNEES: Primary | ICD-10-CM

## 2024-10-24 DIAGNOSIS — M25.561 ACUTE PAIN OF BOTH KNEES: Primary | ICD-10-CM

## 2024-10-24 PROCEDURE — 73560 X-RAY EXAM OF KNEE 1 OR 2: CPT | Performed by: NURSE PRACTITIONER

## 2024-10-24 PROCEDURE — 99214 OFFICE O/P EST MOD 30 MIN: CPT

## 2024-10-24 PROCEDURE — 99213 OFFICE O/P EST LOW 20 MIN: CPT

## 2024-10-24 NOTE — ED INITIAL ASSESSMENT (HPI)
Patient reports bilateral knee pain since Sunday.  Patient reports the left knee is worse than the right.  Patient states she vacuumed and afterwards experienced pain.

## 2024-10-24 NOTE — DISCHARGE INSTRUCTIONS
Please use Voltaren gel topically.  You may take Tylenol.    Ace wrap to the left knee while awake.  Rest, ice, and elevate.  Follow closely with your primary as discussed

## 2024-10-24 NOTE — TELEPHONE ENCOUNTER
Action Requested: Summary for Provider     []  Critical Lab, Recommendations Needed  [] Need Additional Advice  []   FYI    []   Need Orders  [] Need Medications Sent to Pharmacy  []  Other     SUMMARY: pt will go to [facility] today (10/24).    Reason for call: knee pain  Onset: 4-5 days    Left knee 4-5 days  sharp, aching and  burning   Pain level: 10/10 with activity  No chest pain, no difficulty breathing, no calf pain  Minimal swelling, no redness, no fever.   Applied ice, taking ibuprofen has been helping   No availability with PCP rest of week. Offered appt with PA today - pt declined. Advised to go to [facility] for further eval/tx, possible XR available if needed.   No further questions or concerns. Pt verbalized understanding and agreed with POC.    Reason for Disposition   SEVERE pain (e.g., excruciating, unable to walk)    Protocols used: Knee Pain-A-[place]
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
## 2024-10-29 ENCOUNTER — OFFICE VISIT (OUTPATIENT)
Dept: INTERNAL MEDICINE CLINIC | Facility: CLINIC | Age: 69
End: 2024-10-29
Payer: MEDICARE

## 2024-10-29 VITALS
RESPIRATION RATE: 19 BRPM | HEIGHT: 64 IN | WEIGHT: 121 LBS | TEMPERATURE: 98 F | BODY MASS INDEX: 20.66 KG/M2 | HEART RATE: 73 BPM | DIASTOLIC BLOOD PRESSURE: 70 MMHG | SYSTOLIC BLOOD PRESSURE: 118 MMHG | OXYGEN SATURATION: 98 %

## 2024-10-29 DIAGNOSIS — M17.11 OSTEOARTHRITIS OF RIGHT KNEE, UNSPECIFIED OSTEOARTHRITIS TYPE: ICD-10-CM

## 2024-10-29 DIAGNOSIS — M25.562 ACUTE PAIN OF BOTH KNEES: Primary | ICD-10-CM

## 2024-10-29 DIAGNOSIS — M25.561 ACUTE PAIN OF BOTH KNEES: Primary | ICD-10-CM

## 2024-10-29 PROCEDURE — 99214 OFFICE O/P EST MOD 30 MIN: CPT
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
## 2024-10-30 ENCOUNTER — TELEPHONE (OUTPATIENT)
Dept: PHYSICAL THERAPY | Age: 69
End: 2024-10-30

## 2024-11-07 ENCOUNTER — OFFICE VISIT (OUTPATIENT)
Dept: INTERNAL MEDICINE CLINIC | Facility: CLINIC | Age: 69
End: 2024-11-07
Payer: MEDICARE

## 2024-11-07 VITALS
OXYGEN SATURATION: 99 % | HEART RATE: 76 BPM | BODY MASS INDEX: 19.56 KG/M2 | WEIGHT: 118.81 LBS | HEIGHT: 65.35 IN | SYSTOLIC BLOOD PRESSURE: 110 MMHG | DIASTOLIC BLOOD PRESSURE: 72 MMHG

## 2024-11-07 DIAGNOSIS — F41.9 ANXIETY DISORDER, UNSPECIFIED TYPE: ICD-10-CM

## 2024-11-07 DIAGNOSIS — Z00.00 ENCOUNTER FOR ANNUAL HEALTH EXAMINATION: Primary | ICD-10-CM

## 2024-11-07 DIAGNOSIS — D25.9 UTERINE LEIOMYOMA, UNSPECIFIED LOCATION: ICD-10-CM

## 2024-11-07 DIAGNOSIS — E03.8 SUBCLINICAL HYPOTHYROIDISM: ICD-10-CM

## 2024-11-07 DIAGNOSIS — M48.061 LUMBAR FORAMINAL STENOSIS: ICD-10-CM

## 2024-11-07 DIAGNOSIS — M51.360 DEGENERATION OF INTERVERTEBRAL DISC OF LUMBAR REGION WITH DISCOGENIC BACK PAIN: ICD-10-CM

## 2024-11-07 DIAGNOSIS — M47.26 OSTEOARTHRITIS OF SPINE WITH RADICULOPATHY, LUMBAR REGION: ICD-10-CM

## 2024-11-07 DIAGNOSIS — M48.061 SPINAL STENOSIS, LUMBAR REGION WITHOUT NEUROGENIC CLAUDICATION: ICD-10-CM

## 2024-11-07 DIAGNOSIS — R91.1 NODULE OF LEFT LUNG: ICD-10-CM

## 2024-11-07 DIAGNOSIS — C16.9 MALIGNANT NEOPLASM OF STOMACH, UNSPECIFIED LOCATION (HCC): ICD-10-CM

## 2024-11-07 DIAGNOSIS — Z86.73 PRSNL HX OF TIA (TIA), AND CEREB INFRC W/O RESID DEFICITS: ICD-10-CM

## 2024-11-07 DIAGNOSIS — F41.9 ANXIETY: ICD-10-CM

## 2024-11-07 DIAGNOSIS — M19.041 PRIMARY OSTEOARTHRITIS OF BOTH HANDS: ICD-10-CM

## 2024-11-07 DIAGNOSIS — M85.89 OSTEOPENIA OF MULTIPLE SITES: ICD-10-CM

## 2024-11-07 DIAGNOSIS — C49.A2 GASTROINTESTINAL STROMAL TUMOR (GIST) OF STOMACH (HCC): ICD-10-CM

## 2024-11-07 DIAGNOSIS — K51.40 PSEUDOPOLYPOSIS OF COLON, UNSPECIFIED COMPLICATION STATUS, UNSPECIFIED PART OF COLON (HCC): ICD-10-CM

## 2024-11-07 DIAGNOSIS — H40.002 GLAUCOMA SUSPECT, LEFT EYE: ICD-10-CM

## 2024-11-07 DIAGNOSIS — M19.042 PRIMARY OSTEOARTHRITIS OF BOTH HANDS: ICD-10-CM

## 2024-11-07 DIAGNOSIS — M17.11 OSTEOARTHRITIS OF RIGHT KNEE, UNSPECIFIED OSTEOARTHRITIS TYPE: ICD-10-CM

## 2024-11-07 DIAGNOSIS — K76.89 HEPATIC CYST: ICD-10-CM

## 2024-11-07 PROBLEM — Z51.81 ENCOUNTER FOR THERAPEUTIC DRUG LEVEL MONITORING: Status: RESOLVED | Noted: 2023-01-01 | Resolved: 2024-11-07

## 2024-11-07 PROBLEM — M54.12 RADICULOPATHY, CERVICAL REGION: Status: RESOLVED | Noted: 2023-01-01 | Resolved: 2024-11-07

## 2024-11-07 PROBLEM — D63.0 ANEMIA IN NEOPLASTIC DISEASE: Status: RESOLVED | Noted: 2023-01-01 | Resolved: 2024-11-07

## 2024-11-07 PROBLEM — E78.5 HYPERLIPIDEMIA, UNSPECIFIED: Status: RESOLVED | Noted: 2023-01-01 | Resolved: 2024-11-07

## 2024-11-07 PROBLEM — F32.A DEPRESSION, UNSPECIFIED: Status: RESOLVED | Noted: 2023-01-01 | Resolved: 2024-11-07

## 2024-11-07 PROBLEM — Z79.02 LONG TERM (CURRENT) USE OF ANTITHROMBOTICS/ANTIPLATELETS: Status: RESOLVED | Noted: 2023-01-01 | Resolved: 2024-11-07

## 2024-11-07 PROBLEM — R73.03 PREDIABETES: Status: RESOLVED | Noted: 2022-10-27 | Resolved: 2024-11-07

## 2024-11-07 PROBLEM — K63.5 POLYP OF COLON: Status: RESOLVED | Noted: 2022-12-06 | Resolved: 2024-11-07

## 2024-11-07 PROBLEM — I11.0 HYPERTENSIVE HEART DISEASE WITH HEART FAILURE (HCC): Status: RESOLVED | Noted: 2023-05-04 | Resolved: 2024-11-07

## 2024-11-07 PROBLEM — H54.7 UNSPECIFIED VISUAL LOSS: Status: RESOLVED | Noted: 2023-01-01 | Resolved: 2024-11-07

## 2024-11-07 PROBLEM — I50.9 HEART FAILURE, UNSPECIFIED (HCC): Status: RESOLVED | Noted: 2023-01-01 | Resolved: 2024-11-07

## 2024-11-07 PROBLEM — Z48.3 AFTERCARE FOLLOWING SURGERY FOR NEOPLASM: Status: RESOLVED | Noted: 2023-05-05 | Resolved: 2024-11-07

## 2024-11-07 PROBLEM — E11.9 TYPE 2 DIABETES MELLITUS WITHOUT COMPLICATIONS (HCC): Status: RESOLVED | Noted: 2023-01-01 | Resolved: 2024-11-07

## 2024-11-07 PROBLEM — B35.1 ONYCHOMYCOSIS: Status: RESOLVED | Noted: 2023-10-30 | Resolved: 2024-11-07

## 2024-11-07 PROCEDURE — G0439 PPPS, SUBSEQ VISIT: HCPCS | Performed by: INTERNAL MEDICINE

## 2024-11-07 NOTE — PROGRESS NOTES
Subjective:   Vonnie Colbert is a 69 year old female who presents for a Medicare Subsequent Annual Wellness visit (Pt already had Initial Annual Wellness) and scheduled follow up of multiple significant but stable problems.     Since last evaluation the patient reports no significant changes.  She is experiencing a bilateral knee pain that improves with infrequent low-dose NSAID therapy.  She is scheduled for physical therapy beginning tomorrow.  His symptoms of chronic tension headache have improved following initiation of stress relieving activities and supplementation with magnesium Glisan may offer him more refreshing sleep and reduced anxiety.    History/Other:   Fall Risk Assessment:   She has been screened for Falls and is low risk.      Cognitive Assessment:   She had a completely normal cognitive assessment - see flowsheet entries     Functional Ability/Status:   Vonnie Colbert has a completely normal functional assessment. See flowsheet for details.        Depression Screening (PHQ):  PHQ-2 SCORE: 0  , done 11/7/2024   Feeling tired or having little energy: 2    If you checked off any problems, how difficult have these problems made it for you to do your work, take care of things at home, or get along with other people?: Not difficult at all         5 minutes spent screening and counseling for depression    Advanced Directives:   She does have a Living Will but we do NOT have it on file in Epic.    She does have a POA but we do NOT have it on file in Epic.    Discussed Advance Care Planning with patient (and family/surrogate if present). Standard forms made available to patient in After Visit Summary.      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)    Onychomycosis    Glaucoma suspect, left  eye    DDD (degenerative disc disease), lumbar    Osteoarthritis of spine with radiculopathy, lumbar region    Aftercare following surgery for neoplasm    Spinal stenosis, lumbar region without neurogenic claudication    Anemia in neoplastic disease    Anxiety disorder, unspecified    Sjogren syndrome, unspecified (HCC)    Unspecified visual loss    Depression, unspecified    Encounter for therapeutic drug level monitoring    Malignant neoplasm of stomach, unspecified (HCC)    Heart failure, unspecified (HCC)    Hyperlipidemia, unspecified    Hypertensive heart disease with heart failure (HCC)    Long term (current) use of antithrombotics/antiplatelets    Prsnl hx of TIA (TIA), and cereb infrc w/o resid deficits    Rheumatoid arthritis with rheumatoid factor, unspecified (HCC)    Unspecified osteoarthritis, unspecified site    Type 2 diabetes mellitus without complications (HCC)    Radiculopathy, cervical region    Hepatic cyst    Nodule of left lung     Allergies:  She is allergic to mushrooms, shrimp, watermelon, amoxicillin trihydrate, celecoxib, dilaudid [hydromorphone], potassium clavulanate, allergy, vioxx [rofecoxib], and diphenhydramine.    Current Medications:  Outpatient Medications Marked as Taking for the 11/7/24 encounter (Office Visit) with Orion Reynolds MD   Medication Sig    ibuprofen 200 MG Oral Tab Take 1 tablet (200 mg total) by mouth every 6 (six) hours as needed for Pain.    Glucose Blood (CONTOUR NEXT TEST) In Vitro Strip 1 test strip to test blood sugar two times daily.    Microlet Lancets Does not apply Misc TEST BLOOD SUGAR TWICE DAILY    Polyvinyl Alcohol (LUBRICANT DROPS OP) Place 1 drop into both eyes daily as needed.    omega-3 fatty acids 1000 MG Oral Cap Take 1,000 mg by mouth 2 (two) times daily.    Multiple Vitamins-Minerals (WOMENS MULTIVITAMIN PLUS) Oral Tab Take 1 tablet by mouth daily.    Cholecalciferol (VITAMIN D) 1000 UNITS Oral Cap Take 1 tablet by mouth 2 (two) times daily.     Calcium 500 MG Oral Tab Take 500 mg by mouth 2 (two) times daily.       Medical History:  She  has a past medical history of Abdominal cramping, Abnormal menses, Abscess, groin, Acute gastroenteritis (04/15/2004), Anemia, Anxiety, Arthritis, Atypical ductal hyperplasia of breast (07/13/2011), Atypical ductal hyperplasia of breast (03/17/2008), Back pain, Blind, Bloating, Breast calcifications, Breast nodule (10/07/2009), Cataract, Cervical radiculopathy (10/09/2007), Cervical strain (03/22/2007), Change in hair, Chest pain, CHF (congestive heart failure) ([facility]), CHF (congestive heart failure) ([facility]), Constipation, Costochondritis, Dehydration (04/15/2004), Depression, Diarrhea, unspecified, Disorder of thyroid, Dry mouth, Dysphagia, Exostosis (11/08/2011), Fatigue (03/06/2012), Fatigue, Feels cold (03/06/2012), HA (headache) (12/09/2014), Hair loss, Hand tingling, Headache(784.0), Hemiparesis ([facility]), Hemorrhoids, High blood pressure, High cholesterol, Hip pain, History of bone density study (10/02/2010), HYPERTENSION NOS (08/08/2007), Jaw pain (12/09/2014), Knee pain (05/20/2011), Lateral epicondylitis, LBP (low back pain), Leg pain, Lumbar foraminal stenosis, Lumbar sprain, Memory deficit, Nausea, Neck pain, Night sweats, Nocturia, Nonintractable headache (07/02/2021), Oligomenorrhea, Otalgia, Other and unspecified hyperlipidemia, Palpitation, Paresthesia, PHN (postherpetic neuralgia), Plantar fasciitis (11/08/2011), Positive MAU (antinuclear antibody) (02/06/2019), Positive H. pylori test (2005), Postmenopausal, Rheumatoid factor positive (02/06/2019), Scalp contusion (12/31/2010), Sebaceous cyst, Shingles, Shoulder pain, Sicca syndrome (HCC) (02/06/2019), Sinus disease (05/15/2008), Sinus infection, Sprain of interphalangeal (joint) of hand, Stress, Tendinitis, TIA (transient ischemic attack) (04/07/2012), TIA (transient ischemic attack) (04/07/2012), TMJ syndrome (03/06/2012), TMJ syndrome (03/06/2012), Type II  or unspecified type diabetes mellitus without mention of complication, not stated as uncontrolled, Unspecified essential hypertension, URI (upper respiratory infection), Urinary retention, Uterine fibroid (09/05/2002), Visual impairment, Vomiting, Weakness (12/09/2014), Wears glasses, and Weight gain.  Surgical History:  She  has a past surgical history that includes special service or report; breast surgery procedure unlisted (1999, 3/17/2008, 10/16/2009); stereotactic breast biopsy (02/25/2008); chinmay localization wire 1 site left (cpt=19281) (2008); chinmay localization wire 1 site right (cpt=19281) (1999); needle biopsy right (2009); colonoscopy; and other surgical history (05/04/2023).   Family History:  Her family history includes Cancer (age of onset: 60) in her paternal uncle; Diabetes in her brother; Other in her father and mother; Stroke in her father.  Social History:  She  reports that she has never smoked. She has never been exposed to tobacco smoke. She has never used smokeless tobacco. She reports that she does not currently use alcohol. She reports that she does not use drugs.    Tobacco:  She has never smoked tobacco.    CAGE Alcohol Screen:   CAGE screening score of 0 on 11/7/2024, showing low risk of alcohol abuse.      Patient Care Team:  Orion Reynolds MD as PCP - General (Internal Medicine)  Lucia Allen MD (NEUROLOGY)  Ely Busby APRN as PCP (Internal Medicine)  Cait Null, PT as Physical Therapist (Physical Therapy)  Nay Rodney, PT as Physical Therapist  Ok Thurman OT as Occupational Therapist (Occupational Therapist)  Gene Altman as Physical Therapist (Physical Therapy)  Isi De Los Santos DO (RHEUMATOLOGY)  Donna Poole MD (Hematology and Oncology)  Mainor Hernandez DO (GASTROENTEROLOGY)  Janelle Hernandez, PT as Physical Therapist  Yvrose Deluca APRN (Nurse Practitioner)  Krystal Goldman PT as Physical Therapist  Lucy Mcgregor PT as Physical Therapist (Physical Therapy)  Artur  GAVIN Carcamo as Registered Nurse (Nurse Practitioner Family)  Sol Alford PT as Physical Therapist    Review of Systems  GENERAL: feels well otherwise  SKIN: denies any unusual skin lesions  EYES: denies blurred vision or double vision  HEENT: denies nasal congestion, sinus pain or ST  LUNGS: denies shortness of breath with exertion  CARDIOVASCULAR: denies chest pain on exertion  GI: denies abdominal pain, denies heartburn  : denies dysuria, vaginal discharge or itching, no complaint of urinary incontinence   MUSCULOSKELETAL: denies back pain  NEURO: denies headaches  PSYCHE: denies depression or anxiety  HEMATOLOGIC: denies hx of anemia  ENDOCRINE: denies thyroid history  ALL/ASTHMA: denies hx of allergy or asthma    Objective:   Physical Exam  General Appearance:  Alert, cooperative, no distress, appears stated age   Head:  Normocephalic, without obvious abnormality, atraumatic   Eyes:  Bilateral conjunctiva within normal limits   Ears:  Tympanic membrane within normal limits bilaterally   Nose: Deferred   Throat: Deferred   Neck: Supple, symmetrical, trachea midline, no adenopathy;  thyroid: not enlarged, symmetric, no tenderness/mass/nodules; no carotid bruit or JVD   Back:   Symmetric, no curvature, ROM normal, no CVA tenderness   Lungs:   Clear to auscultation bilaterally, respirations unlabored   Heart:  Regular rate and rhythm, S1 and S2 normal, no murmur, rub, or gallop   Abdomen:   Soft, non-tender, bowel sounds active all four quadrants,  no masses, no organomegaly   Pelvic: Deferred   Extremities: No edema   Pulses: 2+ and symmetric   Skin: Skin color, texture, turgor normal, no rashes or lesions   Lymph nodes: Cervical nodes normal   Neurologic: Grossly normal       /72 (BP Location: Left arm, Patient Position: Sitting, Cuff Size: adult)   Pulse 76   Ht 5' 5.35\" (1.66 m)   Wt 118 lb 12.8 oz (53.9 kg)   SpO2 99%   BMI 19.56 kg/m²  Estimated body mass index is 19.56 kg/m² as calculated  from the following:    Height as of this encounter: 5' 5.35\" (1.66 m).    Weight as of this encounter: 118 lb 12.8 oz (53.9 kg).    Medicare Hearing Assessment:   Hearing Screening    Time taken: 11/7/2024 11:30 AM  Entry User: Luis Antonio Servin  Screening Method: Finger Rub  Finger Rub Result: Pass         Visual Acuity:   Right Eye Visual Acuity: Corrected (Can't see with R eye) Right Eye Chart Acuity: 20/400   Left Eye Visual Acuity: Corrected Left Eye Chart Acuity: 20/30   Both Eyes Visual Acuity: Corrected Both Eyes Chart Acuity: 20/25   Able To Tolerate Visual Acuity: Yes        Assessment & Plan:   Vonnie Colbert is a 69 year old female who presents for a Medicare Assessment.     Outstanding screening and preventive measures:  Screening for breast cancer: Mammogram scheduled    Bilateral knee pain secondary to osteoarthritis in the setting of overuse:   Scheduled for physical therapy tomorrow    GIST:  Of lesser curvature of stomach  Post wedge resection and omental flap   Following regularly with oncology and surgical oncology services for surveillance     Subclinical hypothyroidism:  TSH pending collection  Following with endocrinology service  Asymptomatic     Lumbar foraminal stenosis, degenerative disc disease, and osteoarthritis:  Stable and asymptomatic  No focal neurological deficits continue self-directed physical therapy     Osteopenia:  Stable continue calcium and vitamin D supplementation, and weightbearing exercise  Following with endocrionology service      Osteoarthritis of bilateral hands:  Stable symptomsContinue as needed topical therapy     Uterine leiomyoma:  Asymptomatic  Continue observation per gynecology service     Glaucoma suspect of left eye:  Stable   Following with ophthalmology service    Hepatic cyst:  Scattered  Stable  Undergoing Q6mo surveillance for history of GIST    Pulmonary nodules:  2 mm RAFA and 1 mm RAFA: stable    Anxiety:  Improving with relaxation techniques and magnesium  glycinate supplementation     1. Encounter for annual health examination (Primary)  The patient indicates understanding of these issues and agrees to the plan.  Reinforced healthy diet, lifestyle, and exercise.      Return in 6 months (on 5/7/2025).     Orion Reynolds MD, 11/7/2024     Supplementary Documentation:   General Health:  In the past six months, have you lost more than 10 pounds without trying?: 2 - No  Has your appetite been poor?: No  Type of Diet: Balanced;Low Salt;Diabetic  How does the patient maintain a good energy level?: Appropriate Exercise;Daily Walks;Stretching  How would you describe your daily physical activity?: Light  How would you describe your current health state?: Good  How do you maintain positive mental well-being?: Social Interaction;Visiting Friends;Visiting Family  On a scale of 0 to 10, with 0 being no pain and 10 being severe pain, what is your pain level?: 5 - (Moderate) (Bilateral knee pain)  In the past six months, have you experienced urine leakage?: 0-No  At any time do you feel concerned for the safety/well-being of yourself and/or your children, in your home or elsewhere?: No  Have you had any immunizations at another office such as Influenza, Hepatitis B, Tetanus, or Pneumococcal?: Yes    Health Maintenance   Topic Date Due    Annual Physical  10/30/2024    Mammogram  12/15/2024    Colorectal Cancer Screening  12/06/2027    Influenza Vaccine  Completed    DEXA Scan  Completed    Annual Depression Screening  Completed    Fall Risk Screening (Annual)  Completed    Pneumococcal Vaccine: 65+ Years  Completed    Zoster Vaccines  Completed    COVID-19 Vaccine  Completed

## 2024-11-08 ENCOUNTER — OFFICE VISIT (OUTPATIENT)
Dept: PHYSICAL THERAPY | Facility: HOSPITAL | Age: 69
End: 2024-11-08
Payer: MEDICARE

## 2024-11-08 DIAGNOSIS — M25.561 ACUTE PAIN OF BOTH KNEES: Primary | ICD-10-CM

## 2024-11-08 DIAGNOSIS — M17.11 OSTEOARTHRITIS OF RIGHT KNEE, UNSPECIFIED OSTEOARTHRITIS TYPE: ICD-10-CM

## 2024-11-08 DIAGNOSIS — M25.562 ACUTE PAIN OF BOTH KNEES: Primary | ICD-10-CM

## 2024-11-08 PROCEDURE — 97110 THERAPEUTIC EXERCISES: CPT | Performed by: PHYSICAL THERAPIST

## 2024-11-08 PROCEDURE — 97161 PT EVAL LOW COMPLEX 20 MIN: CPT | Performed by: PHYSICAL THERAPIST
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
## 2024-11-11 ENCOUNTER — APPOINTMENT (OUTPATIENT)
Dept: PHYSICAL THERAPY | Facility: HOSPITAL | Age: 69
End: 2024-11-11
Payer: MEDICARE

## 2024-11-13 ENCOUNTER — OFFICE VISIT (OUTPATIENT)
Dept: PODIATRY CLINIC | Facility: CLINIC | Age: 69
End: 2024-11-13

## 2024-11-13 DIAGNOSIS — E11.9 TYPE 2 DIABETES MELLITUS WITHOUT COMPLICATION, UNSPECIFIED WHETHER LONG TERM INSULIN USE (HCC): ICD-10-CM

## 2024-11-13 DIAGNOSIS — M20.41 HAMMER TOE OF RIGHT FOOT: ICD-10-CM

## 2024-11-13 DIAGNOSIS — L60.3 NAIL DYSTROPHY: Primary | ICD-10-CM

## 2024-11-13 DIAGNOSIS — S99.922A INJURY OF TOENAIL OF LEFT FOOT, INITIAL ENCOUNTER: ICD-10-CM

## 2024-11-13 DIAGNOSIS — M21.619 BUNION: ICD-10-CM

## 2024-11-13 PROCEDURE — 99213 OFFICE O/P EST LOW 20 MIN: CPT | Performed by: STUDENT IN AN ORGANIZED HEALTH CARE EDUCATION/TRAINING PROGRAM
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
## 2024-11-15 ENCOUNTER — OFFICE VISIT (OUTPATIENT)
Dept: PHYSICAL THERAPY | Facility: HOSPITAL | Age: 69
End: 2024-11-15
Payer: MEDICARE

## 2024-11-15 PROCEDURE — 97110 THERAPEUTIC EXERCISES: CPT | Performed by: PHYSICAL THERAPIST

## 2024-11-15 PROCEDURE — 97140 MANUAL THERAPY 1/> REGIONS: CPT | Performed by: PHYSICAL THERAPIST

## 2024-11-15 NOTE — PROGRESS NOTES
Diagnosis:   Acute pain of both knees (M25.561,M25.562)  Osteoarthritis of right knee, unspecified osteoarthritis type (M17.11)            Referring Provider: Dona Siddiqui  Date of Evaluation:    11/8/24    Precautions:  None Next MD visit:   none scheduled  Date of Surgery: n/a   Insurance Primary/Secondary: MEDICARE / [employer] INDEMNITY     # Auth Visits: [id number]            Subjective: knees ache, have been trying to keep up with my exercise. Hurts to bend my knees .     Pain: 4/10 bilateral knees       Objective: seen for first treatment.    Lateral tilt bilaterally in supine;  poor VMO contraction noted.    Assessment: good tolerance but is weak mil eccentrically when completing her exercise.       Goals:   Pt will improve knee extension ROM to 0 deg to allow proper heel strike during gait and terminal knee extension in stance   Pt will improve knee AROM flexion to improve ability to perform bending and lifting activities such as cleaning, vacuuming    Pt will improve quad strength to 5/5 to ascend 1 flight of stairs reciprocally without UE assist   Pt will increase hip and knee strength to grossly 4+/5 to be able to get up and down from the floor safely   Pt will demonstrate increased hip ER/ABD strength to 4+/5 to perform stepping and squatting activities without excessive femoral IR/ADD   Pt will improve SLS to >10 s to improve safety with gait on uneven surfaces such as grass and gravel  Pt will be independent and compliant with comprehensive HEP to maintain progress achieved in PT      Plan: progress mil with CKC, ecc control of exercise.   Date: 11/15/2024  TX#: 2/8 Date:                 TX#: 3/ Date:                 TX#: 4/ Date:                 TX#: 5/ Date:   Tx#: 6/   NuStep x5'        TE:   Pilate's ring x20 hip ab/hip add x20   Supine SAQ x20 2#   Standing:   Airex slb with ft hold x5 each LE   Side steps x4 laps red TB FT hold   BW red tc x10   Shuttle x20 (with ball )   25#   X15 single leg        MT:  STM To b PF with joint mobs medial>lateral glides x10'               HEP: Access Code: [email or web address]  URL: [email or web address]  Date: 11/08/2024  Prepared by: Sol Alford    Exercises  - Clamshell  - 1 x daily - 7 x weekly - 2 sets - 10 reps  - Sidelying Hip Abduction  - 1 x daily - 7 x weekly - 2 sets - 10 reps  - Supine Active Straight Leg Raise  - 1 x daily - 7 x weekly - 2 sets - 10 reps  - Straight Leg Raise with External Rotation  - 1 x daily - 7 x weekly - 2 sets - 10 reps      Charges: 1 MT 2 TE       Total Timed Treatment: 45 min  Total Treatment Time: 45 min

## 2024-11-18 ENCOUNTER — TELEPHONE (OUTPATIENT)
Dept: PHYSICAL THERAPY | Facility: HOSPITAL | Age: 69
End: 2024-11-18

## 2024-11-18 ENCOUNTER — OFFICE VISIT (OUTPATIENT)
Dept: PHYSICAL THERAPY | Facility: HOSPITAL | Age: 69
End: 2024-11-18
Payer: MEDICARE

## 2024-11-18 PROCEDURE — 97110 THERAPEUTIC EXERCISES: CPT | Performed by: PHYSICAL THERAPIST

## 2024-11-18 PROCEDURE — 97140 MANUAL THERAPY 1/> REGIONS: CPT | Performed by: PHYSICAL THERAPIST

## 2024-11-18 NOTE — PROGRESS NOTES
Diagnosis:   Acute pain of both knees (M25.561,M25.562)  Osteoarthritis of right knee, unspecified osteoarthritis type (M17.11)            Referring Provider: Dona Siddiqui  Date of Evaluation:    11/8/24    Precautions:  None Next MD visit:   none scheduled  Date of Surgery: n/a   Insurance Primary/Secondary: MEDICARE / [place]DEMN[place]     # Auth Visits: [id number]            Subjective: knees ache, have been trying to keep up with my exercise. Hurts to bend my knees .     Pain: 4/10 bilateral knees       Objective: seen for first treatment.    Lateral tilt bilaterally in supine;  poor VMO contraction noted.    Assessment: good tolerance but is weak mil eccentrically when completing her exercise.       Goals:   Pt will improve knee extension ROM to 0 deg to allow proper heel strike during gait and terminal knee extension in stance   Pt will improve knee AROM flexion to improve ability to perform bending and lifting activities such as cleaning, vacuuming    Pt will improve quad strength to 5/5 to ascend 1 flight of stairs reciprocally without UE assist   Pt will increase hip and knee strength to grossly 4+/5 to be able to get up and down from the floor safely   Pt will demonstrate increased hip ER/ABD strength to 4+/5 to perform stepping and squatting activities without excessive femoral IR/ADD   Pt will improve SLS to >10 s to improve safety with gait on uneven surfaces such as grass and gravel  Pt will be independent and compliant with comprehensive HEP to maintain progress achieved in PT      Plan: progress mil with CKC, ecc control of exercise.   Date: 11/15/2024  TX#: 2/8 Date:                 TX#: 3/ Date:                 TX#: 4/ Date:                 TX#: 5/ Date:   Tx#: 6/   NuStep x5'        TE:   Pilate's ring x20 hip ab/hip add x20   Supine SAQ x20 2#   Standing:   Airex slb with ft hold x5 each LE   Side steps x4 laps red TB FT hold   BW red tc x10   Shuttle x20 (with ball )   25#   X15 single leg        MT:  STM To b PF with joint mobs medial>lateral glides x10'               HEP: Access Code: [email or web address]  URL: [email or web address]  Date: 11/08/2024  Prepared by: Sol Alford    Exercises  - Clamshell  - 1 x daily - 7 x weekly - 2 sets - 10 reps  - Sidelying Hip Abduction  - 1 x daily - 7 x weekly - 2 sets - 10 reps  - Supine Active Straight Leg Raise  - 1 x daily - 7 x weekly - 2 sets - 10 reps  - Straight Leg Raise with External Rotation  - 1 x daily - 7 x weekly - 2 sets - 10 reps      Charges: 1 MT 2 TE       Total Timed Treatment: 45 min  Total Treatment Time: 45 min

## 2024-11-21 ENCOUNTER — HOSPITAL ENCOUNTER (OUTPATIENT)
Dept: CT IMAGING | Facility: HOSPITAL | Age: 69
Discharge: HOME | End: 2024-11-21
Attending: INTERNAL MEDICINE
Payer: MEDICARE

## 2024-11-21 DIAGNOSIS — C49.A2 GASTROINTESTINAL STROMAL TUMOR (GIST) OF STOMACH (HCC): ICD-10-CM

## 2024-11-21 LAB
CREAT BLD-MCNC: 0.6 MG/DL
EGFRCR SERPLBLD CKD-EPI 2021: 97 ML/MIN/1.73M2 (ref 60–?)

## 2024-11-21 PROCEDURE — 82565 ASSAY OF CREATININE: CPT

## 2024-11-21 PROCEDURE — 71260 CT THORAX DX C+: CPT | Performed by: INTERNAL MEDICINE

## 2024-11-21 PROCEDURE — 74177 CT ABD & PELVIS W/CONTRAST: CPT | Performed by: INTERNAL MEDICINE

## 2024-11-22 ENCOUNTER — TELEPHONE (OUTPATIENT)
Dept: HEMATOLOGY/ONCOLOGY | Facility: HOSPITAL | Age: 69
End: 2024-11-22

## 2024-11-22 ENCOUNTER — OFFICE VISIT (OUTPATIENT)
Dept: PHYSICAL THERAPY | Facility: HOSPITAL | Age: 69
End: 2024-11-22
Payer: MEDICARE

## 2024-11-22 PROCEDURE — 97110 THERAPEUTIC EXERCISES: CPT | Performed by: PHYSICAL THERAPIST

## 2024-11-22 PROCEDURE — 97140 MANUAL THERAPY 1/> REGIONS: CPT | Performed by: PHYSICAL THERAPIST

## 2024-11-22 NOTE — TELEPHONE ENCOUNTER
Donna Poole MD Willett, Pamella A RN  Caller: Unspecified (Today, 12:12 PM)  CT was reviewed yesterday-no evidence of disease. Thanks      Pt states report says gallbladder surgically removed.   She states she has not had her gallbladder removed.

## 2024-11-22 NOTE — PROGRESS NOTES
Diagnosis:   Acute pain of both knees (M25.561,M25.562)  Osteoarthritis of right knee, unspecified osteoarthritis type (M17.11)            Referring Provider: Dona Siddiqui  Date of Evaluation:    11/8/24    Precautions:  None Next MD visit:   none scheduled  Date of Surgery: n/a   Insurance Primary/Secondary: MEDICARE / [place]DEMN[place]     # Auth Visits: 10            Subjective: Went to the basement the other day and the pain wasn't as bad as it had been before.  Going down the stairs wants as painful.  States that she works out with a  and that it help with the knee pain .  Continues to note left >right knee pain .    Pain: 6/10 bilateral knees       Objective: seen for treatment.   TTP along medial aspect oft he left knee along the medial joint line and along the medial aspect of the left knee.     Assessment:progressed with increased weight to shuttle with double leg press exercise but without any sig increase in her pain .   Goals:   Pt will improve knee extension ROM to 0 deg to allow proper heel strike during gait and terminal knee extension in stance   Pt will improve knee AROM flexion to improve ability to perform bending and lifting activities such as cleaning, vacuuming    Pt will improve quad strength to 5/5 to ascend 1 flight of stairs reciprocally without UE assist   Pt will increase hip and knee strength to grossly 4+/5 to be able to get up and down from the floor safely   Pt will demonstrate increased hip ER/ABD strength to 4+/5 to perform stepping and squatting activities without excessive femoral IR/ADD   Pt will improve SLS to >10 s to improve safety with gait on uneven surfaces such as grass and gravel  Pt will be independent and compliant with comprehensive HEP to maintain progress achieved in PT      Plan: progress mil with CKC, ecc control of exercise.   Date: 11/15/2024  TX#: 2/8 Date:        11/19/24         TX#: 3/ Date:      11/22/2024      TX#: 4/ Date:                  TX#: 5/ Date:   Tx#: 6/   NuStep x5'  NuStep x5'       TE:   Pilate's ring x20 hip ab/hip add x20   Supine SAQ x20 2#   Standing:   Airex slb with ft hold x5 each LE   Side steps x4 laps red TB FT hold   BW red tc x10   Shuttle x20 (with ball )   25#   X15 single leg  TE:   Grn TB BKFO x20   Bridge iwht band/ball x20   Supine SAQ x20 2#   Standing:   Airex slb with ft hold x5 each LE   Side steps x4 laps red TB FT hold   BW red tc x10   Shuttle x20 (with ball )   25#   X15 single leg  Shuttle x20 (with ball )   50 #   X15 single leg 25# right   12#left x20   Balance board x2' each direction   TC side steps x10 red   BW x10   TE:   Grn TB BKFO x20   Bridge with  band/ball x20        MT: STM To b PF with joint mobs medial>lateral glides x10'  MT: STM To b PF with joint mobs medial>lateral glides x10'  MT: STM To b PF with joint mobs medial>lateral glides x10'             HEP: Access Code: [email or web address]  URL: [email or web address]  Date: 11/08/2024  Prepared by: Sol Alford    Exercises  - Clamshell  - 1 x daily - 7 x weekly - 2 sets - 10 reps  - Sidelying Hip Abduction  - 1 x daily - 7 x weekly - 2 sets - 10 reps  - Supine Active Straight Leg Raise  - 1 x daily - 7 x weekly - 2 sets - 10 reps  - Straight Leg Raise with External Rotation  - 1 x daily - 7 x weekly - 2 sets - 10 reps      Charges: 1 MT 2 TE       Total Timed Treatment: 45 min  Total Treatment Time: 45 min

## 2024-11-22 NOTE — PROGRESS NOTES
Diagnosis:   Acute pain of both knees (M25.561,M25.562)  Osteoarthritis of right knee, unspecified osteoarthritis type (M17.11)            Referring Provider: Dona Siddiqui  Date of Evaluation:    11/8/24    Precautions:  None Next MD visit:   none scheduled  Date of Surgery: n/a   Insurance Primary/Secondary: [place] / [place]     # Auth Visits: 10            Subjective: Is noting pain to be a little bit better in my knee. Still hurts mil at the end of the day .     Pain: 4/10 bilateral knees       Objective: seen for treatment.    No KT for knees today .      Assessment: Is progressing well with current treatment.  VMO continues to be weak and medial aspect of the bilateral knees, and suspect that if the VMO gets stronger then her pain will improved.   Goals:   Pt will improve knee extension ROM to 0 deg to allow proper heel strike during gait and terminal knee extension in stance   Pt will improve knee AROM flexion to improve ability to perform bending and lifting activities such as cleaning, vacuuming    Pt will improve quad strength to 5/5 to ascend 1 flight of stairs reciprocally without UE assist   Pt will increase hip and knee strength to grossly 4+/5 to be able to get up and down from the floor safely   Pt will demonstrate increased hip ER/ABD strength to 4+/5 to perform stepping and squatting activities without excessive femoral IR/ADD   Pt will improve SLS to >10 s to improve safety with gait on uneven surfaces such as grass and gravel  Pt will be independent and compliant with comprehensive HEP to maintain progress achieved in PT      Plan: progress mil with CKC, ecc control of exercise.   Date: 11/15/2024  TX#: 2/8 Date:        11/19/24         TX#: 3/ Date:                 TX#: 4/ Date:                 TX#: 5/ Date:   Tx#: 6/   NuStep x5'  NuStep x5'       TE:   Pilate's ring x20 hip ab/hip add x20   Supine SAQ x20 2#   Standing:   Airex slb with ft hold x5 each LE   Side steps x4 laps red  TB FT hold   BW red tc x10   Shuttle x20 (with ball )   25#   X15 single leg  TE:   Grn TB BKFO x20   Bridge iwht band/ball x20   Supine SAQ x20 2#   Standing:   Airex slb with ft hold x5 each LE   Side steps x4 laps red TB FT hold   BW red tc x10   Shuttle x20 (with ball )   25#   X15 single leg       MT: STM To b PF with joint mobs medial>lateral glides x10'  MT: STM To b PF with joint mobs medial>lateral glides x10'              HEP: Access Code: [email or web address]  URL: [email or web address]  Date: 11/08/2024  Prepared by: Sol Alford    Exercises  - Clamshell  - 1 x daily - 7 x weekly - 2 sets - 10 reps  - Sidelying Hip Abduction  - 1 x daily - 7 x weekly - 2 sets - 10 reps  - Supine Active Straight Leg Raise  - 1 x daily - 7 x weekly - 2 sets - 10 reps  - Straight Leg Raise with External Rotation  - 1 x daily - 7 x weekly - 2 sets - 10 reps      Charges: 1 MT 2 TE       Total Timed Treatment: 45 min  Total Treatment Time: 45 min

## 2024-11-22 NOTE — TELEPHONE ENCOUNTER
Pt called stated she had a PET Scan yesterday and has some questions. Pt is requesting to speak to the doctor or aprn    Please give pt a call back

## 2024-11-29 ENCOUNTER — OFFICE VISIT (OUTPATIENT)
Dept: PHYSICAL THERAPY | Facility: HOSPITAL | Age: 69
End: 2024-11-29
Attending: INTERNAL MEDICINE
Payer: MEDICARE

## 2024-11-29 ENCOUNTER — TELEPHONE (OUTPATIENT)
Dept: HEMATOLOGY/ONCOLOGY | Facility: HOSPITAL | Age: 69
End: 2024-11-29

## 2024-11-29 PROCEDURE — 97110 THERAPEUTIC EXERCISES: CPT | Performed by: PHYSICAL THERAPIST

## 2024-11-29 PROCEDURE — 97140 MANUAL THERAPY 1/> REGIONS: CPT | Performed by: PHYSICAL THERAPIST

## 2024-11-29 NOTE — TELEPHONE ENCOUNTER
Pt called stated she is anxious about her results and is requesting a call back to discuss CT Scan results     Pt stated someone need to call her and let her know if everything is okay     Please call pt back

## 2024-11-29 NOTE — PROGRESS NOTES
Diagnosis:   Acute pain of both knees (M25.561,M25.562)  Osteoarthritis of right knee, unspecified osteoarthritis type (M17.11)            Referring Provider: No ref. provider found  Date of Evaluation:    11/8/24    Precautions:  None Next MD visit:   none scheduled  Date of Surgery: n/a   Insurance Primary/Secondary: [place] / [place]     # Auth Visits: 10            Subjective: Continues to note pain right >left side. Is feeling better and continues to work with .     Pain: 6/10 bilateral knees       Objective: seen for treatment.   Knee   Flexion: R 4-/5; L 4-/5  Extension: R 4-/5; L 4-/5        Assessment:Progressing well with current treatment plan .  Continues to be weak mil with functional activities mil with step downs, ecc control   Goals:   Pt will improve knee extension ROM to 0 deg to allow proper heel strike during gait and terminal knee extension in stance   Pt will improve knee AROM flexion to improve ability to perform bending and lifting activities such as cleaning, vacuuming    Pt will improve quad strength to 5/5 to ascend 1 flight of stairs reciprocally without UE assist   Pt will increase hip and knee strength to grossly 4+/5 to be able to get up and down from the floor safely   Pt will demonstrate increased hip ER/ABD strength to 4+/5 to perform stepping and squatting activities without excessive femoral IR/ADD   Pt will improve SLS to >10 s to improve safety with gait on uneven surfaces such as grass and gravel  Pt will be independent and compliant with comprehensive HEP to maintain progress achieved in PT      Plan: progress mil with CKC, ecc control of exercise.   Date: 11/15/2024  TX#: 2/8 Date:        11/19/24         TX#: 3/ Date:      11/22/2024      TX#: 4/ Date:        11/29/2024          TX#: 5/ Date:   Tx#: 6/   NuStep x5'  NuStep x5'  NuStep x5'  NuStep x5'     TE:   Pilate's ring x20 hip ab/hip add x20   Supine SAQ x20 2#   Standing:   Airex slb with ft hold x5  each LE   Side steps x4 laps red TB FT hold   BW red tc x10   Shuttle x20 (with ball )   25#   X15 single leg  TE:   Grn TB BKFO x20   Bridge iwht band/ball x20   Supine SAQ x20 2#   Standing:   Airex slb with ft hold x5 each LE   Side steps x4 laps red TB FT hold   BW red tc x10   Shuttle x20 (with ball )   25#   X15 single leg  Shuttle x20 (with ball )   50 #   X15 single leg 25# right   12#left x20   Balance board x2' each direction   TC side steps x10 red   BW x10   TE:   Grn TB BKFO x20   Bridge with  band/ball x20    Shuttle x20 (with ball )   50 #   X15 single leg 25# right   12#left x20   Balance board x2' each direction   TC side steps x10 red   BW x10   TE:   Grn TB BKFO x20   Bridge with  band/ball x20   Piliates ring add x10     MT: STM To b PF with joint mobs medial>lateral glides x10'  MT: STM To b PF with joint mobs medial>lateral glides x10'  MT: STM To b PF with joint mobs medial>lateral glides x10'  MT: STM To b PF with joint mobs medial>lateral glides x10'            HEP: Access Code: [email or web address]  URL: [email or web address]  Date: 11/08/2024  Prepared by: Sol Alford    Exercises  - Clamshell  - 1 x daily - 7 x weekly - 2 sets - 10 reps  - Sidelying Hip Abduction  - 1 x daily - 7 x weekly - 2 sets - 10 reps  - Supine Active Straight Leg Raise  - 1 x daily - 7 x weekly - 2 sets - 10 reps  - Straight Leg Raise with External Rotation  - 1 x daily - 7 x weekly - 2 sets - 10 reps      Charges: 1 MT 2 TE       Total Timed Treatment: 45 min  Total Treatment Time: 45 min
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
## 2024-12-03 ENCOUNTER — OFFICE VISIT (OUTPATIENT)
Dept: HEMATOLOGY/ONCOLOGY | Facility: HOSPITAL | Age: 69
End: 2024-12-03
Attending: INTERNAL MEDICINE
Payer: MEDICARE

## 2024-12-03 VITALS
HEART RATE: 75 BPM | OXYGEN SATURATION: 98 % | TEMPERATURE: 98 F | BODY MASS INDEX: 19.51 KG/M2 | SYSTOLIC BLOOD PRESSURE: 142 MMHG | HEIGHT: 65.35 IN | WEIGHT: 118.5 LBS | DIASTOLIC BLOOD PRESSURE: 71 MMHG | RESPIRATION RATE: 16 BRPM

## 2024-12-03 DIAGNOSIS — C49.A2 GASTROINTESTINAL STROMAL TUMOR (GIST) OF STOMACH (HCC): ICD-10-CM

## 2024-12-03 DIAGNOSIS — E03.8 SUBCLINICAL HYPOTHYROIDISM: ICD-10-CM

## 2024-12-03 LAB
ALBUMIN SERPL-MCNC: 4.5 G/DL (ref 3.2–4.8)
ALBUMIN/GLOB SERPL: 1.6 {RATIO} (ref 1–2)
ALP LIVER SERPL-CCNC: 81 U/L
ALT SERPL-CCNC: 25 U/L
ANION GAP SERPL CALC-SCNC: 6 MMOL/L (ref 0–18)
AST SERPL-CCNC: 29 U/L (ref ?–34)
BASOPHILS # BLD AUTO: 0.03 X10(3) UL (ref 0–0.2)
BASOPHILS NFR BLD AUTO: 0.5 %
BILIRUB SERPL-MCNC: 0.8 MG/DL (ref 0.2–1.1)
BUN BLD-MCNC: 20 MG/DL (ref 9–23)
CALCIUM BLD-MCNC: 9.8 MG/DL (ref 8.7–10.4)
CHLORIDE SERPL-SCNC: 102 MMOL/L (ref 98–112)
CO2 SERPL-SCNC: 28 MMOL/L (ref 21–32)
CREAT BLD-MCNC: 0.66 MG/DL
EGFRCR SERPLBLD CKD-EPI 2021: 95 ML/MIN/1.73M2 (ref 60–?)
EOSINOPHIL # BLD AUTO: 0.01 X10(3) UL (ref 0–0.7)
EOSINOPHIL NFR BLD AUTO: 0.2 %
ERYTHROCYTE [DISTWIDTH] IN BLOOD BY AUTOMATED COUNT: 13 %
GLOBULIN PLAS-MCNC: 2.8 G/DL (ref 2–3.5)
GLUCOSE BLD-MCNC: 95 MG/DL (ref 70–99)
HCT VFR BLD AUTO: 39.6 %
HGB BLD-MCNC: 13.7 G/DL
IMM GRANULOCYTES # BLD AUTO: 0.02 X10(3) UL (ref 0–1)
IMM GRANULOCYTES NFR BLD: 0.3 %
LYMPHOCYTES # BLD AUTO: 1.44 X10(3) UL (ref 1–4)
LYMPHOCYTES NFR BLD AUTO: 21.8 %
MCH RBC QN AUTO: 30.3 PG (ref 26–34)
MCHC RBC AUTO-ENTMCNC: 34.6 G/DL (ref 31–37)
MCV RBC AUTO: 87.6 FL
MONOCYTES # BLD AUTO: 0.34 X10(3) UL (ref 0.1–1)
MONOCYTES NFR BLD AUTO: 5.2 %
NEUTROPHILS # BLD AUTO: 4.76 X10 (3) UL (ref 1.5–7.7)
NEUTROPHILS # BLD AUTO: 4.76 X10(3) UL (ref 1.5–7.7)
NEUTROPHILS NFR BLD AUTO: 72 %
OSMOLALITY SERPL CALC.SUM OF ELEC: 284 MOSM/KG (ref 275–295)
PLATELET # BLD AUTO: 159 10(3)UL (ref 150–450)
POTASSIUM SERPL-SCNC: 3.9 MMOL/L (ref 3.5–5.1)
PROT SERPL-MCNC: 7.3 G/DL (ref 5.7–8.2)
RBC # BLD AUTO: 4.52 X10(6)UL
SODIUM SERPL-SCNC: 136 MMOL/L (ref 136–145)
TSI SER-ACNC: 4.74 UIU/ML (ref 0.55–4.78)
WBC # BLD AUTO: 6.6 X10(3) UL (ref 4–11)

## 2024-12-03 PROCEDURE — 99215 OFFICE O/P EST HI 40 MIN: CPT | Performed by: INTERNAL MEDICINE

## 2024-12-03 NOTE — PROGRESS NOTES
Patient here for follow-up. Had recent CT 11/21/24. States she feels well overall. Denies any new updates or changes. Would like to discuss CT results in depth.

## 2024-12-05 ENCOUNTER — OFFICE VISIT (OUTPATIENT)
Dept: PHYSICAL THERAPY | Facility: HOSPITAL | Age: 69
End: 2024-12-05
Attending: INTERNAL MEDICINE
Payer: MEDICARE

## 2024-12-05 PROCEDURE — 97110 THERAPEUTIC EXERCISES: CPT

## 2024-12-05 PROCEDURE — 97140 MANUAL THERAPY 1/> REGIONS: CPT

## 2024-12-05 NOTE — PROGRESS NOTES
Diagnosis:   Acute pain of both knees (M25.561,M25.562)  Osteoarthritis of right knee, unspecified osteoarthritis type (M17.11)            Referring Provider: No ref. provider found  Date of Evaluation:    11/8/24    Precautions:  None Next MD visit:   none scheduled  Date of Surgery: n/a   Insurance Primary/Secondary: MEDICARE / [employer] [place]DEMN[place]     # Auth Visits: 10            Subjective: Patient reports the knees are doing a little better.  Reports she still has pain/difficulty with activities requiring bending such as sit<>stand, in/out of care, or navigating stairs.  States 15 minutes of walking is enough to cause increased pain.    Pain: R knee 6-7/10; L knee 8/10 with activities like turning or stair negotiation      Objective: Treatment per flow sheet.      Assessment: Repeated trial of KT taping for L knee as patient is reported continued pain with relatively high intensity during activities.  Incorporated EOB hip flexor/quad stretch with strap to HEP today as patient is demonstrating tightness possibly contributing to increased compression at the patella.  Pt edu in keeping neutral LE alignment (avoidance of dynamic knee valgus) with step up and squatting type activities at home.    Goals:   Pt will improve knee extension ROM to 0 deg to allow proper heel strike during gait and terminal knee extension in stance   Pt will improve knee AROM flexion to improve ability to perform bending and lifting activities such as cleaning, vacuuming    Pt will improve quad strength to 5/5 to ascend 1 flight of stairs reciprocally without UE assist   Pt will increase hip and knee strength to grossly 4+/5 to be able to get up and down from the floor safely   Pt will demonstrate increased hip ER/ABD strength to 4+/5 to perform stepping and squatting activities without excessive femoral IR/ADD   Pt will improve SLS to >10 s to improve safety with gait on uneven surfaces such as grass and gravel  Pt will be independent and  compliant with comprehensive HEP to maintain progress achieved in PT      Plan: Continue per plan of care.    Date: 11/15/2024  TX#: 2/8 Date:        11/19/24         TX#: 3/ Date:      11/22/2024      TX#: 4/ Date:        11/29/2024          TX#: 5/ Date: 12/5/24  Tx#: 6/   NuStep x5'  NuStep x5'  NuStep x5'  NuStep x5'  NuStep L5x5'   TE:   Pilate's ring x20 hip ab/hip add x20   Supine SAQ x20 2#   Standing:   Airex slb with ft hold x5 each LE   Side steps x4 laps red TB FT hold   BW red tc x10   Shuttle x20 (with ball )   25#   X15 single leg  TE:   Grn TB BKFO x20   Bridge iwht band/ball x20   Supine SAQ x20 2#   Standing:   Airex slb with ft hold x5 each LE   Side steps x4 laps red TB FT hold   BW red tc x10   Shuttle x20 (with ball )   25#   X15 single leg  Shuttle x20 (with ball )   50 #   X15 single leg 25# right   12#left x20   Balance board x2' each direction   TC side steps x10 red   BW x10   TE:   Grn TB BKFO x20   Bridge with  band/ball x20    Shuttle x20 (with ball )   50 #   X15 single leg 25# right   12#left x20   Balance board x2' each direction   TC side steps x10 red   BW x10   TE:   Grn TB BKFO x20   Bridge with  band/ball x20   Piliates ring add x10  Therex: in //  4\" fsu w/ RTB ER x15 each B  Standing mini-squat w/ GTB ER 2x10    Supine EOB hip flexor/quad stretch w/ strap 2x30\" each B  Kt taping L knee    (25 min)   MT: STM To b PF with joint mobs medial>lateral glides x10'  MT: STM To b PF with joint mobs medial>lateral glides x10'  MT: STM To b PF with joint mobs medial>lateral glides x10'  MT: STM To b PF with joint mobs medial>lateral glides x10'  Manual: (20 min)  Patellar mobs all planes B  STM to quad B  Hip flexor/quad foam roll x2' each B          HEP: Access Code: [email or web address]  URL: [email or web address]  Date: 11/08/2024  Prepared by: Sol Alford    Exercises  - Clamshell  - 1 x daily - 7 x weekly - 2 sets - 10 reps  - Sidelying Hip Abduction  - 1 x daily - 7 x  weekly - 2 sets - 10 reps  - Supine Active Straight Leg Raise  - 1 x daily - 7 x weekly - 2 sets - 10 reps  - Straight Leg Raise with External Rotation  - 1 x daily - 7 x weekly - 2 sets - 10 reps      Charges: 1 MT 2 TE       Total Timed Treatment: 45 min  Total Treatment Time: 45 min

## 2024-12-11 ENCOUNTER — APPOINTMENT (OUTPATIENT)
Dept: PHYSICAL THERAPY | Facility: HOSPITAL | Age: 69
End: 2024-12-11
Payer: MEDICARE

## 2024-12-17 ENCOUNTER — HOSPITAL ENCOUNTER (OUTPATIENT)
Dept: MAMMOGRAPHY | Age: 69
Discharge: HOME | End: 2024-12-17
Attending: INTERNAL MEDICINE
Payer: MEDICARE

## 2024-12-17 DIAGNOSIS — Z12.31 ENCOUNTER FOR SCREENING MAMMOGRAM FOR MALIGNANT NEOPLASM OF BREAST: ICD-10-CM

## 2024-12-17 PROCEDURE — 77063 BREAST TOMOSYNTHESIS BI: CPT | Performed by: INTERNAL MEDICINE

## 2024-12-17 PROCEDURE — 77067 SCR MAMMO BI INCL CAD: CPT | Performed by: INTERNAL MEDICINE

## 2024-12-20 ENCOUNTER — OFFICE VISIT (OUTPATIENT)
Dept: PHYSICAL THERAPY | Facility: HOSPITAL | Age: 69
End: 2024-12-20
Payer: MEDICARE

## 2024-12-20 PROCEDURE — 97140 MANUAL THERAPY 1/> REGIONS: CPT | Performed by: PHYSICAL THERAPIST

## 2024-12-20 PROCEDURE — 97110 THERAPEUTIC EXERCISES: CPT | Performed by: PHYSICAL THERAPIST

## 2024-12-20 NOTE — PROGRESS NOTES
Diagnosis:   Acute pain of both knees (M25.561,M25.562)  Osteoarthritis of right knee, unspecified osteoarthritis type (M17.11)            Referring Provider: Dona Siddiqui  Date of Evaluation:    11/8/24    Precautions:  None Next MD visit:   none scheduled  Date of Surgery: n/a   Insurance Primary/Secondary: MEDICARE / [place]DEMN[place]     # Auth Visits: 10         Subjective: Tape continues to help the most with the knee pain .  Still has pain with going up and down stairs. \"Just want to squat low down \"     Pain: R knee 6-7/10; L knee 8/10 with activities like turning or stair negotiation      Objective: Treatment per flow sheet.  TTP along left medial knee along patellar border left.   Ecc control left>right weakness     Assessment: continues to note pain with functional activities including stairs, sit>stand activities.  Ecc control is poor, but have been also providing education on knee exercise and knee pain.  Remains below baseline mil with weakness and pain , and also with function and would benefit form additional PT .     Goals:   Pt will improve knee extension ROM to 0 deg to allow proper heel strike during gait and terminal knee extension in stance   Pt will improve knee AROM flexion to improve ability to perform bending and lifting activities such as cleaning, vacuuming    Pt will improve quad strength to 5/5 to ascend 1 flight of stairs reciprocally without UE assist   Pt will increase hip and knee strength to grossly 4+/5 to be able to get up and down from the floor safely   Pt will demonstrate increased hip ER/ABD strength to 4+/5 to perform stepping and squatting activities without excessive femoral IR/ADD   Pt will improve SLS to >10 s to improve safety with gait on uneven surfaces such as grass and gravel  Pt will be independent and compliant with comprehensive HEP to maintain progress achieved in PT      Plan: Continue per plan of care.    Date: 11/15/2024  TX#: 2/8 Date:        11/19/24          TX#: 3/ Date:      11/22/2024      TX#: 4/ Date:        11/29/2024          TX#: 5/ Date: 12/5/24  Tx#: 6/ Date: 12/20/24  Tx#: 6/   NuStep x5'  NuStep x5'  NuStep x5'  NuStep x5'  NuStep L5x5' NuStep L5x5'   TE:   Pilate's ring x20 hip ab/hip add x20   Supine SAQ x20 2#   Standing:   Airex slb with ft hold x5 each LE   Side steps x4 laps red TB FT hold   BW red tc x10   Shuttle x20 (with ball )   25#   X15 single leg  TE:   Grn TB BKFO x20   Bridge iwht band/ball x20   Supine SAQ x20 2#   Standing:   Airex slb with ft hold x5 each LE   Side steps x4 laps red TB FT hold   BW red tc x10   Shuttle x20 (with ball )   25#   X15 single leg  Shuttle x20 (with ball )   50 #   X15 single leg 25# right   12#left x20   Balance board x2' each direction   TC side steps x10 red   BW x10   TE:   Grn TB BKFO x20   Bridge with  band/ball x20    Shuttle x20 (with ball )   50 #   X15 single leg 25# right   12#left x20   Balance board x2' each direction   TC side steps x10 red   BW x10   TE:   Grn TB BKFO x20   Bridge with  band/ball x20   Piliates ring add x10  Therex: in //  4\" fsu w/ RTB ER x15 each B  Standing mini-squat w/ GTB ER 2x10    Supine EOB hip flexor/quad stretch w/ strap 2x30\" each B  Kt taping L knee    (25 min) Therex: in //  4\" fsu w/ RTB ER x15 each B  Standing mini-squat w/ GTB ER 2x10    TE:   Grn TB BKFO x20   Bridge with  band/ball x20     TC side steps x10 red   BW x10    MT: STM To b PF with joint mobs medial>lateral glides x10'  MT: STM To b PF with joint mobs medial>lateral glides x10'  MT: STM To b PF with joint mobs medial>lateral glides x10'  MT: STM To b PF with joint mobs medial>lateral glides x10'  Manual: (20 min)  Patellar mobs all planes B  STM to quad B  Hip flexor/quad foam roll x2' each B Manual: (20 min)  Patellar mobs all planes B  STM to quad B  Hip flexor/quad foam roll x2' each B           HEP: Access Code: [email or web address]  URL: [email or web address]  Date: 11/08/2024  Prepared  by: Sol Alford    Exercises  - Clamshell  - 1 x daily - 7 x weekly - 2 sets - 10 reps  - Sidelying Hip Abduction  - 1 x daily - 7 x weekly - 2 sets - 10 reps  - Supine Active Straight Leg Raise  - 1 x daily - 7 x weekly - 2 sets - 10 reps  - Straight Leg Raise with External Rotation  - 1 x daily - 7 x weekly - 2 sets - 10 reps      Charges: 1 MT 2 TE       Total Timed Treatment: 45 min  Total Treatment Time: 45 min

## 2024-12-24 ENCOUNTER — OFFICE VISIT (OUTPATIENT)
Dept: PHYSICAL THERAPY | Facility: HOSPITAL | Age: 69
End: 2024-12-24
Attending: INTERNAL MEDICINE
Payer: MEDICARE

## 2024-12-24 PROCEDURE — 97110 THERAPEUTIC EXERCISES: CPT | Performed by: PHYSICAL THERAPIST

## 2024-12-24 PROCEDURE — 97140 MANUAL THERAPY 1/> REGIONS: CPT | Performed by: PHYSICAL THERAPIST

## 2024-12-24 NOTE — PROGRESS NOTES
Diagnosis:   Acute pain of both knees (M25.561,M25.562)  Osteoarthritis of right knee, unspecified osteoarthritis type (M17.11)            Referring Provider: No ref. provider found  Date of Evaluation:    11/8/24    Precautions:  None Next MD visit:   none scheduled  Date of Surgery: n/a   Insurance Primary/Secondary: MEDICARE / [place]MN[place]     # Auth Visits: 10         Subjective: Notes a little bit of pain improvement but does continue to note pain with stairs, mil left knee.  Pain: R knee 6-7/10; L knee 8/10 with activities like turning or stair negotiation      Objective: Treatment per flow sheet.  Ecc control 3+/5 overall bilateral knee     Assessment: Is progressing well with current plan , and she is starting to note a slight bit of improvement with coming down the stair.s  Continues to be weak mil in medial knee stability .     Goals:   Pt will improve knee extension ROM to 0 deg to allow proper heel strike during gait and terminal knee extension in stance   Pt will improve knee AROM flexion to improve ability to perform bending and lifting activities such as cleaning, vacuuming    Pt will improve quad strength to 5/5 to ascend 1 flight of stairs reciprocally without UE assist   Pt will increase hip and knee strength to grossly 4+/5 to be able to get up and down from the floor safely   Pt will demonstrate increased hip ER/ABD strength to 4+/5 to perform stepping and squatting activities without excessive femoral IR/ADD   Pt will improve SLS to >10 s to improve safety with gait on uneven surfaces such as grass and gravel  Pt will be independent and compliant with comprehensive HEP to maintain progress achieved in PT      Plan: Continue per plan of care.    Date: 11/15/2024  TX#: 2/8 Date:        11/19/24         TX#: 3/ Date:      11/22/2024      TX#: 4/ Date:        11/29/2024          TX#: 5/ Date: 12/5/24  Tx#: 6/ Date: 12/20/24  Tx#: 6/ Date: 12/24/24  Tx#: 6/   NuStep x5'  NuStep x5'  NuStep x5'   NuStep x5'  NuStep L5x5' NuStep L5x5' NuStep L5x5'   TE:   Pilate's ring x20 hip ab/hip add x20   Supine SAQ x20 2#   Standing:   Airex slb with ft hold x5 each LE   Side steps x4 laps red TB FT hold   BW red tc x10   Shuttle x20 (with ball )   25#   X15 single leg  TE:   Grn TB BKFO x20   Bridge iwht band/ball x20   Supine SAQ x20 2#   Standing:   Airex slb with ft hold x5 each LE   Side steps x4 laps red TB FT hold   BW red tc x10   Shuttle x20 (with ball )   25#   X15 single leg  Shuttle x20 (with ball )   50 #   X15 single leg 25# right   12#left x20   Balance board x2' each direction   TC side steps x10 red   BW x10   TE:   Grn TB BKFO x20   Bridge with  band/ball x20    Shuttle x20 (with ball )   50 #   X15 single leg 25# right   12#left x20   Balance board x2' each direction   TC side steps x10 red   BW x10   TE:   Grn TB BKFO x20   Bridge with  band/ball x20   Piliates ring add x10  Therex: in // 4\" fsu w/ RTB ER x15 each B  Standing mini-squat w/ GTB ER 2x10    Supine EOB hip flexor/quad stretch w/ strap 2x30\" each B  Kt taping L knee    (25 min) Therex: in // 4\" fsu w/ RTB ER x15 each B  Standing mini-squat w/ GTB ER 2x10    TE:   Grn TB BKFO x20   Bridge with  band/ball x20     TC side steps x10 red   BW x10  Therex: in // 4\" fsu w/ x15 each B  Standing mini-squat w/ GTB ER 2x10    TKEs red TB x20     TC side steps x10 red   BW x10   Shuttle x20 B 50# squats   Shuttle x15 VMO emphasis   Single leg x10 25 #    MT: STM To b PF with joint mobs medial>lateral glides x10'  MT: STM To b PF with joint mobs medial>lateral glides x10'  MT: STM To b PF with joint mobs medial>lateral glides x10'  MT: STM To b PF with joint mobs medial>lateral glides x10'  Manual: (20 min)  Patellar mobs all planes B  STM to quad B  Hip flexor/quad foam roll x2' each B Manual: (20 min)  Patellar mobs all planes B  STM to quad B  Hip flexor/quad foam roll x2' each B Manual: (20 min)  Patellar mobs all planes B  STM to quad B  Hip  flexor/quad foam roll x2' each B            HEP: Access Code: [email or web address]  URL: [email or web address]  Date: 11/08/2024  Prepared by: Sol Alford    Exercises  - Clamshell  - 1 x daily - 7 x weekly - 2 sets - 10 reps  - Sidelying Hip Abduction  - 1 x daily - 7 x weekly - 2 sets - 10 reps  - Supine Active Straight Leg Raise  - 1 x daily - 7 x weekly - 2 sets - 10 reps  - Straight Leg Raise with External Rotation  - 1 x daily - 7 x weekly - 2 sets - 10 reps      Charges: 1 MT 2 TE       Total Timed Treatment: 45 min  Total Treatment Time: 45 min

## 2025-01-07 ENCOUNTER — OFFICE VISIT (OUTPATIENT)
Dept: PHYSICAL THERAPY | Facility: HOSPITAL | Age: 70
End: 2025-01-07
Attending: INTERNAL MEDICINE
Payer: MEDICARE

## 2025-01-07 PROCEDURE — 97140 MANUAL THERAPY 1/> REGIONS: CPT | Performed by: PHYSICAL THERAPIST

## 2025-01-07 PROCEDURE — 97110 THERAPEUTIC EXERCISES: CPT | Performed by: PHYSICAL THERAPIST

## 2025-01-07 NOTE — PROGRESS NOTES
Progress Summary  Pt has attended 8 visits in Physical Therapy.  Diagnosis:   Acute pain of both knees (M25.561,M25.562)  Osteoarthritis of right knee, unspecified osteoarthritis type (M17.11)            Referring Provider: No ref. provider found  Date of Evaluation:    11/8/24    Precautions:  None Next MD visit:   none scheduled  Date of Surgery: n/a   Insurance Primary/Secondary: MEDICARE / [employer]DEMN[place]     # Auth Visits: 10         Subjective: Left knee is feeling good, but has also noted that it is easier to get in and out of the car. Still notes discomfort along the inside of the left >right knee but it is getting better.   Pain: R knee 6-7/10; L knee 8/10 with activities like turning or stair negotiation      Objective: Treatment per flow sheet.  Ecc control 3+/5 overall bilateral knee left , 4-/5 right   Palpation with continued tenderness along the medial aspect of the left >right PF but with subjective decreased tenderness along the joint line.  Improved mm tone noted along medial joint line right knee .     Assessment: subjectively is continuing to note improvement mil with car transfers and stairs while still painful are improving . She is compliant with her HEP and is improving overall, and is continuing to exercise at [employer] and utilize personal training /equipment.     Goals:   Pt will improve knee extension ROM to 0 deg to allow proper heel strike during gait and terminal knee extension in stance PROGRESSING    Pt will improve knee AROM flexion to improve ability to perform bending and lifting activities such as cleaning, vacuuming  PROGRESSING    Pt will improve quad strength to 5/5 to ascend 1 flight of stairs reciprocally without UE assist PROGRESSING    Pt will increase hip and knee strength to grossly 4+/5 to be able to get up and down from the floor safely PROGRESSING    Pt will demonstrate increased hip ER/ABD strength to 4+/5 to perform stepping and squatting activities without  excessive femoral IR/ADD PROGRESSING    Pt will improve SLS to >10 s to improve safety with gait on uneven surfaces such as grass and gravel PROGRESSING    Pt will be independent and compliant with comprehensive HEP to maintain progress achieved in PT  PROGRESSING      Plan: Continue per plan of care , recommend continued PT with emphasis   LEFS Score  LEFS Score: (Patient-Rptd) 53.75 % (11/8/2024 11:12 AM)    Post LEFS Score  Post LEFS Score: (Patient-Rptd) 43.75 % (1/7/2025  3:05 PM)    -10 % improvement   Date: 11/15/2024  TX#: 2/8 Date:        11/19/24         TX#: 3/ Date:      11/22/2024      TX#: 4/ Date:        11/29/2024          TX#: 5/ Date: 12/5/24  Tx#: 6/ Date: 12/20/24  Tx#: 7/ Date: 12/24/24  Tx#: 8/ Date: 1/7/2025   Tx#:      NuStep x5'  NuStep x5'  NuStep x5'  NuStep x5'  NuStep L5x5' NuStep L5x5' NuStep L5x5' NuStep L5x5'   TE:   Pilate's ring x20 hip ab/hip add x20   Supine SAQ x20 2#   Standing:   Airex slb with ft hold x5 each LE   Side steps x4 laps red TB FT hold   BW red tc x10   Shuttle x20 (with ball )   25#   X15 single leg  TE:   Grn TB BKFO x20   Bridge with band/ball x20   Supine SAQ x20 2#   Standing:   Airex slb with ft hold x5 each LE   Side steps x4 laps red TB FT hold   BW red tc x10   Shuttle x20 (with ball )   25#   X15 single leg  Shuttle x20 (with ball )   50 #   X15 single leg 25# right   12#left x20   Balance board x2' each direction   TC side steps x10 red   BW x10   TE:   Grn TB BKFO x20   Bridge with  band/ball x20    Shuttle x20 (with ball )   50 #   X15 single leg 25# right   12#left x20   Balance board x2' each direction   TC side steps x10 red   BW x10   TE:   Grn TB BKFO x20   Bridge with  band/ball x20   Piliates ring add x10  Therex: in //  4\" fsu w/ RTB ER x15 each B  Standing mini-squat w/ GTB ER 2x10    Supine EOB hip flexor/quad stretch w/ strap 2x30\" each B  Kt taping L knee    (25 min) Therex: in // 4\" fsu w/ RTB ER x15 each B  Standing mini-squat w/ GTB ER  2x10    TE:   Grn TB BKFO x20   Bridge with  band/ball x20     TC side steps x10 red   BW x10  Therex: in //  4\" fsu w/ x15 each B  Standing mini-squat w/ GTB ER 2x10    TKEs red TB x20     TC side steps x10 red   BW x10   Shuttle x20 B 50# squats   Shuttle x15 VMO emphasis   Single leg x10 25 #  TC side steps x10 red   BW x10 red TC   Shuttle x20 B 50# squats   Pilate's ring x20 hip ab/hip add   4\" fsu w/ RTB ER x15 each B       MT: STM To b PF with joint mobs medial>lateral glides x10'  MT: STM To b PF with joint mobs medial>lateral glides x10'  MT: STM To b PF with joint mobs medial>lateral glides x10'  MT: STM To b PF with joint mobs medial>lateral glides x10'  Manual: (20 min)  Patellar mobs all planes B  STM to quad B  Hip flexor/quad foam roll x2' each B Manual: (20 min)  Patellar mobs all planes B  STM to quad B  Hip flexor/quad foam roll x2' each B Manual: (20 min)  Patellar mobs all planes B  STM to quad B     Manual: (20 min)  Patellar mobs all planes B  STM to quad B  Hip flexor/quad foam roll x2' each B             HEP: Access Code: [email or web address]  URL: [email or web address]  Date: 11/08/2024  Prepared by: Sol Alford    Exercises  - Clamshell  - 1 x daily - 7 x weekly - 2 sets - 10 reps  - Sidelying Hip Abduction  - 1 x daily - 7 x weekly - 2 sets - 10 reps  - Supine Active Straight Leg Raise  - 1 x daily - 7 x weekly - 2 sets - 10 reps  - Straight Leg Raise with External Rotation  - 1 x daily - 7 x weekly - 2 sets - 10 reps      Charges: 1 MT 2 TE       Total Timed Treatment: 45 min  Total Treatment Time: 45 min

## 2025-01-15 ENCOUNTER — APPOINTMENT (OUTPATIENT)
Dept: PHYSICAL THERAPY | Facility: HOSPITAL | Age: 70
End: 2025-01-15
Payer: MEDICARE

## 2025-01-16 ENCOUNTER — OFFICE VISIT (OUTPATIENT)
Dept: PODIATRY CLINIC | Facility: CLINIC | Age: 70
End: 2025-01-16

## 2025-01-16 DIAGNOSIS — E11.9 TYPE 2 DIABETES MELLITUS WITHOUT COMPLICATION, UNSPECIFIED WHETHER LONG TERM INSULIN USE (HCC): ICD-10-CM

## 2025-01-16 DIAGNOSIS — M21.619 BUNION: ICD-10-CM

## 2025-01-16 DIAGNOSIS — L84 FOOT CALLUS: ICD-10-CM

## 2025-01-16 DIAGNOSIS — L60.3 NAIL DYSTROPHY: Primary | ICD-10-CM

## 2025-01-16 DIAGNOSIS — S99.922A INJURY OF TOENAIL OF LEFT FOOT, INITIAL ENCOUNTER: ICD-10-CM

## 2025-01-16 DIAGNOSIS — M20.41 HAMMER TOE OF RIGHT FOOT: ICD-10-CM

## 2025-01-16 PROCEDURE — 99213 OFFICE O/P EST LOW 20 MIN: CPT | Performed by: STUDENT IN AN ORGANIZED HEALTH CARE EDUCATION/TRAINING PROGRAM
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
## 2025-01-17 ENCOUNTER — APPOINTMENT (OUTPATIENT)
Dept: PHYSICAL THERAPY | Facility: HOSPITAL | Age: 70
End: 2025-01-17
Payer: MEDICARE

## 2025-01-17 ENCOUNTER — OFFICE VISIT (OUTPATIENT)
Dept: PHYSICAL THERAPY | Facility: HOSPITAL | Age: 70
End: 2025-01-17
Attending: INTERNAL MEDICINE
Payer: MEDICARE

## 2025-01-17 PROCEDURE — 97112 NEUROMUSCULAR REEDUCATION: CPT | Performed by: PHYSICAL THERAPIST

## 2025-01-17 PROCEDURE — 97140 MANUAL THERAPY 1/> REGIONS: CPT | Performed by: PHYSICAL THERAPIST

## 2025-01-17 PROCEDURE — 97110 THERAPEUTIC EXERCISES: CPT | Performed by: PHYSICAL THERAPIST

## 2025-01-17 NOTE — PROGRESS NOTES
Progress Summary  Pt has attended 8 visits in Physical Therapy.  Diagnosis:   Acute pain of both knees (M25.561,M25.562)  Osteoarthritis of right knee, unspecified osteoarthritis type (M17.11)            Referring Provider: No ref. provider found  Date of Evaluation:    11/8/24    Precautions:  None Next MD visit:   none scheduled  Date of Surgery: n/a   Insurance Primary/Secondary: MEDICARE / [employer] [place] INDEMNITY     # Auth Visits: 10         Subjective: exercises are helping ..  I see the hope in my knee pain relief . Getting up and down from chair, getting in and out of the car is easier  stairs to be the biggest challenge.   Pain: R knee 6-7/10; L knee 8/10 with activities like turning or stair negotiation      Objective: Treatment per flow sheet.  Ecc control 3+/5 overall bilateral knee left , 4-/5 right   Observation with tendency to IR with completing step downs left >right knee, but corrects with lateal pull while completing step downs and verbal cues.     Assessment: Is progressing mil with decreased pain and discomfort with completing step downs in the clinic and then also with home activities mil car , chair transfers.  Weak persistently in her quads, hamstrings and gluteus, but subjectively is continuing o note improvement in home abilities and participation in a work out program .    Goals:   Pt will improve knee extension ROM to 0 deg to allow proper heel strike during gait and terminal knee extension in stance PROGRESSING    Pt will improve knee AROM flexion to improve ability to perform bending and lifting activities such as cleaning, vacuuming  PROGRESSING    Pt will improve quad strength to 5/5 to ascend 1 flight of stairs reciprocally without UE assist PROGRESSING    Pt will increase hip and knee strength to grossly 4+/5 to be able to get up and down from the floor safely PROGRESSING    Pt will demonstrate increased hip ER/ABD strength to 4+/5 to perform stepping and squatting activities without  excessive femoral IR/ADD PROGRESSING    Pt will improve SLS to >10 s to improve safety with gait on uneven surfaces such as grass and gravel PROGRESSING    Pt will be independent and compliant with comprehensive HEP to maintain progress achieved in PT  PROGRESSING      Plan: Continue per plan of care , recommend continued PT with emphasis   LEFS Score  LEFS Score: (Patient-Rptd) 53.75 % (11/8/2024 11:12 AM)    Post LEFS Score  Post LEFS Score: (Patient-Rptd) 43.75 % (1/7/2025  3:05 PM)    -10 % improvement   Date: 11/15/2024  TX#: 2/8 Date:        11/19/24         TX#: 3/ Date:      11/22/2024      TX#: 4/ Date:        11/29/2024          TX#: 5/ Date: 12/5/24  Tx#: 6/ Date: 12/20/24  Tx#: 7/ Date: 12/24/24  Tx#: 8/ Date: 1/7/2025   Tx#: 9   Date: 1/17/2025   Tx#:         NuStep x5'  NuStep x5'  NuStep x5'  NuStep x5'  NuStep L5x5' NuStep L5x5' NuStep L5x5' NuStep L5x5' NuStep L5x5'    TE:   Pilate's ring x20 hip ab/hip add x20   Supine SAQ x20 2#   Standing:   Airex slb with ft hold x5 each LE   Side steps x4 laps red TB FT hold   BW red tc x10   Shuttle x20 (with ball )   25#   X15 single leg  TE:   Grn TB BKFO x20   Bridge with band/ball x20   Supine SAQ x20 2#   Standing:   Airex slb with ft hold x5 each LE   Side steps x4 laps red TB FT hold   BW red tc x10   Shuttle x20 (with ball )   25#   X15 single leg  Shuttle x20 (with ball )   50 #   X15 single leg 25# right   12#left x20   Balance board x2' each direction   TC side steps x10 red   BW x10   TE:   Grn TB BKFO x20   Bridge with  band/ball x20    Shuttle x20 (with ball )   50 #   X15 single leg 25# right   12#left x20   Balance board x2' each direction   TC side steps x10 red   BW x10   TE:   Grn TB BKFO x20   Bridge with  band/ball x20   Piliates ring add x10  Therex: in //  4\" fsu w/ RTB ER x15 each B  Standing mini-squat w/ GTB ER 2x10    Supine EOB hip flexor/quad stretch w/ strap 2x30\" each B  Kt taping L knee    (25 min) Therex: in // 4\" fsu w/ RTB  ER x15 each B  Standing mini-squat w/ GTB ER 2x10    TE:   Grn TB BKFO x20   Bridge with  band/ball x20     TC side steps x10 red   BW x10  Therex: in //  4\" fsu w/ x15 each B  Standing mini-squat w/ GTB ER 2x10    TKEs red TB x20     TC side steps x10 red   BW x10   Shuttle x20 B 50# squats   Shuttle x15 VMO emphasis   Single leg x10 25 #  TC side steps x10 red   BW x10 red TC   Shuttle x20 B 50# squats   Pilate's ring x20 hip ab/hip add   4\" fsu w/ RTB ER x15 each B     NMR:   Pilate's ring x20 hip ab/hip add each component   Bridge with pilate's ring x10          Step up>over>down 4\" x10 each cues for proper knee position and lateral pull with red TB   TKEs' red tb x2x10   BW x10 red TC     MT: STM To b PF with joint mobs medial>lateral glides x10'  MT: STM To b PF with joint mobs medial>lateral glides x10'  MT: STM To b PF with joint mobs medial>lateral glides x10'  MT: STM To b PF with joint mobs medial>lateral glides x10'  Manual: (20 min)  Patellar mobs all planes B  STM to quad B  Hip flexor/quad foam roll x2' each B Manual: (20 min)  Patellar mobs all planes B  STM to quad B  Hip flexor/quad foam roll x2' each B Manual: (20 min)  Patellar mobs all planes B  STM to quad B     Manual: (20 min)  Patellar mobs all planes B  STM to quad B  Hip flexor/quad foam roll x2' each B Manual: (10 min)  Patellar mobs all planes B            TE:   Shuttle x10 31# squats   Shuttle x10 with ball for VMO   Shuttle x10 single leg 25# bilaterally    HEP: Access Code: [email or web address]  URL: [email or web address]  Date: 11/08/2024  Prepared by: Sol Alford    Exercises  - Clamshell  - 1 x daily - 7 x weekly - 2 sets - 10 reps  - Sidelying Hip Abduction  - 1 x daily - 7 x weekly - 2 sets - 10 reps  - Supine Active Straight Leg Raise  - 1 x daily - 7 x weekly - 2 sets - 10 reps  - Straight Leg Raise with External Rotation  - 1 x daily - 7 x weekly - 2 sets - 10 reps      Charges: 1 MT1 TE 1 NMR       Total Timed  Treatment: 45 min  Total Treatment Time: 45 min

## 2025-01-24 ENCOUNTER — APPOINTMENT (OUTPATIENT)
Dept: PHYSICAL THERAPY | Facility: HOSPITAL | Age: 70
End: 2025-01-24
Attending: INTERNAL MEDICINE
Payer: MEDICARE

## 2025-01-27 ENCOUNTER — OFFICE VISIT (OUTPATIENT)
Dept: PHYSICAL THERAPY | Facility: HOSPITAL | Age: 70
End: 2025-01-27
Attending: INTERNAL MEDICINE
Payer: MEDICARE

## 2025-01-27 PROCEDURE — 97112 NEUROMUSCULAR REEDUCATION: CPT | Performed by: PHYSICAL THERAPIST

## 2025-01-27 PROCEDURE — 97110 THERAPEUTIC EXERCISES: CPT | Performed by: PHYSICAL THERAPIST

## 2025-01-27 PROCEDURE — 97140 MANUAL THERAPY 1/> REGIONS: CPT | Performed by: PHYSICAL THERAPIST

## 2025-01-27 NOTE — PROGRESS NOTES
Diagnosis:   Acute pain of both knees (M25.561,M25.562)  Osteoarthritis of right knee, unspecified osteoarthritis type (M17.11)            Referring Provider: No ref. provider found  Date of Evaluation:    11/8/24    Precautions:  None Next MD visit:   none scheduled  Date of Surgery: n/a   Insurance Primary/Secondary: MEDICARE / [employer] [place] INDEMNITY     # Auth Visits: 10         Subjective: Stairs continue to be improving .. Getting in and out of car is easier .   Noticing more discomfort along the outside of the left knee. That just started about three days ago.  Pain: R knee 4-7/10; L knee 8/10 with activities like turning or stair negotiation      Objective: Treatment per flow sheet.  Ecc control 3+/5 overall bilateral knee left , 4-/5 right.   TTP along left ITB and along insertion .     Assessment: Overall strength remains weak, and functional activities are continuing to improve overall. Is progressing well mil with car transfers and decreased pain with same .    Goals:   Pt will improve knee extension ROM to 0 deg to allow proper heel strike during gait and terminal knee extension in stance PROGRESSING    Pt will improve knee AROM flexion to improve ability to perform bending and lifting activities such as cleaning, vacuuming  PROGRESSING    Pt will improve quad strength to 5/5 to ascend 1 flight of stairs reciprocally without UE assist PROGRESSING    Pt will increase hip and knee strength to grossly 4+/5 to be able to get up and down from the floor safely PROGRESSING    Pt will demonstrate increased hip ER/ABD strength to 4+/5 to perform stepping and squatting activities without excessive femoral IR/ADD PROGRESSING    Pt will improve SLS to >10 s to improve safety with gait on uneven surfaces such as grass and gravel PROGRESSING    Pt will be independent and compliant with comprehensive HEP to maintain progress achieved in PT  PROGRESSING      Plan: Continue per plan of care , recommend continued PT with  emphasis on strength, endurance, function mobility mil stairs   LEFS Score  LEFS Score: (Patient-Rptd) 53.75 % (11/8/2024 11:12 AM)    Post LEFS Score  Post LEFS Score: (Patient-Rptd) 43.75 % (1/7/2025  3:05 PM)    -10 % improvement   Date: 11/15/2024  TX#: 2/8 Date:        11/19/24         TX#: 3/ Date:      11/22/2024      TX#: 4/ Date:        11/29/2024          TX#: 5/ Date: 12/5/24  Tx#: 6/ Date: 12/20/24  Tx#: 7/ Date: 12/24/24  Tx#: 8/ Date: 1/7/2025   Tx#: 9   Date: 1/17/2025   Tx#: 10   1/27/2025       NuStep x5'  NuStep x5'  NuStep x5'  NuStep x5'  NuStep L5x5' NuStep L5x5' NuStep L5x5' NuStep L5x5' NuStep L5x5' NuStep L5x5'   TE:   Pilate's ring x20 hip ab/hip add x20   Supine SAQ x20 2#   Standing:   Airex slb with ft hold x5 each LE   Side steps x4 laps red TB FT hold   BW red tc x10   Shuttle x20 (with ball )   25#   X15 single leg  TE:   Grn TB BKFO x20   Bridge with band/ball x20   Supine SAQ x20 2#   Standing:   Airex slb with ft hold x5 each LE   Side steps x4 laps red TB FT hold   BW red tc x10   Shuttle x20 (with ball )   25#   X15 single leg  Shuttle x20 (with ball )   50 #   X15 single leg 25# right   12#left x20   Balance board x2' each direction   TC side steps x10 red   BW x10   TE:   Grn TB BKFO x20   Bridge with  band/ball x20    Shuttle x20 (with ball )   50 #   X15 single leg 25# right   12#left x20   Balance board x2' each direction   TC side steps x10 red   BW x10   TE:   Grn TB BKFO x20   Bridge with  band/ball x20   Piliates ring add x10  Therex: in //  4\" fsu w/ RTB ER x15 each B  Standing mini-squat w/ GTB ER 2x10    Supine EOB hip flexor/quad stretch w/ strap 2x30\" each B  Kt taping L knee    (25 min) Therex: in // 4\" fsu w/ RTB ER x15 each B  Standing mini-squat w/ GTB ER 2x10    TE:   Grn TB BKFO x20   Bridge with  band/ball x20     TC side steps x10 red   BW x10  Therex: in // 4\" fsu w/ x15 each B  Standing mini-squat w/ GTB ER 2x10    TKEs red TB x20     TC side steps x10  red   BW x10   Shuttle x20 B 50# squats   Shuttle x15 VMO emphasis   Single leg x10 25 #  TC side steps x10 red   BW x10 red TC   Shuttle x20 B 50# squats   Pilate's ring x20 hip ab/hip add   4\" fsu w/ RTB ER x15 each B     NMR:   Pilate's ring x20 hip ab/hip add each component   Bridge with pilate's ring x10          Step up>over>down 4\" x10 each cues for proper knee position and lateral pull with red TB   TKEs' red tb x2x10   BW x10 red TC  NMR:   Pilate's ring x20 hip ab/hip add each component   Bridge with pilate's ring x10     Step up>over>down 4\" x10 each cues for proper knee position and lateral pull with red TB    MT: STM To b PF with joint mobs medial>lateral glides x10'  MT: STM To b PF with joint mobs medial>lateral glides x10'  MT: STM To b PF with joint mobs medial>lateral glides x10'  MT: STM To b PF with joint mobs medial>lateral glides x10'  Manual: (20 min)  Patellar mobs all planes B  STM to quad B  Hip flexor/quad foam roll x2' each B Manual: (20 min)  Patellar mobs all planes B  STM to quad B  Hip flexor/quad foam roll x2' each B Manual: (20 min)  Patellar mobs all planes B  STM to quad B     Manual: (20 min)  Patellar mobs all planes B  STM to quad B  Hip flexor/quad foam roll x2' each B Manual: (10 min)  Patellar mobs all planes B Manual: (10 min)  Patellar mobs all planes B           TE:   Shuttle x10 31# squats   Shuttle x10 with ball for VMO   Shuttle x10 single leg 25# bilaterally TE:   Shuttle x10 31# squats   Shuttle x10 with ball for VMO   Shuttle x10 single leg 25# bilaterally   HEP: Access Code: [email or web address]  URL: [email or web address]  Date: 11/08/2024  Prepared by: Sol Alford    Exercises  - Clamshell  - 1 x daily - 7 x weekly - 2 sets - 10 reps  - Sidelying Hip Abduction  - 1 x daily - 7 x weekly - 2 sets - 10 reps  - Supine Active Straight Leg Raise  - 1 x daily - 7 x weekly - 2 sets - 10 reps  - Straight Leg Raise with External Rotation  - 1 x daily - 7 x weekly -  2 sets - 10 reps      Charges: 1 MT1 TE 1 NMR       Total Timed Treatment: 45 min  Total Treatment Time: 45 min

## 2025-01-30 ENCOUNTER — OFFICE VISIT (OUTPATIENT)
Dept: PHYSICAL THERAPY | Facility: HOSPITAL | Age: 70
End: 2025-01-30
Attending: INTERNAL MEDICINE
Payer: MEDICARE

## 2025-01-30 PROCEDURE — 97112 NEUROMUSCULAR REEDUCATION: CPT | Performed by: PHYSICAL THERAPIST

## 2025-01-30 PROCEDURE — 97110 THERAPEUTIC EXERCISES: CPT | Performed by: PHYSICAL THERAPIST

## 2025-01-30 PROCEDURE — 97140 MANUAL THERAPY 1/> REGIONS: CPT | Performed by: PHYSICAL THERAPIST

## 2025-01-30 NOTE — PROGRESS NOTES
Diagnosis:   Acute pain of both knees (M25.561,M25.562)  Osteoarthritis of right knee, unspecified osteoarthritis type (M17.11)            Referring Provider: No ref. provider found  Date of Evaluation:    11/8/24    Precautions:  None Next MD visit:   none scheduled  Date of Surgery: n/a   Insurance Primary/Secondary: [place]     # Auth Visits: 10         Subjective: Right knee pain now more prominent , and more uncomfortable mil along the tib plateau line medial>lateral aspect. Notes left knee strength to be a little bit better.   Pain: R knee 8/10; L knee 7/10 with activities like turning or stair negotiation      Objective: Treatment per flow sheet.  Ecc control 3+/5 overall bilateral knee left , 4-/5 right.   TTP along the right medial tubercle, but without swelling of the same .     Assessment: Is progressing well with current treatment plan, and today was able to recognize the improvement in her left leg strength, mil with endurance and balance. She has been able to tolerate increasing level of exercises and endurance.   Goals:   Pt will improve knee extension ROM to 0 deg to allow proper heel strike during gait and terminal knee extension in stance PROGRESSING    Pt will improve knee AROM flexion to improve ability to perform bending and lifting activities such as cleaning, vacuuming  PROGRESSING    Pt will improve quad strength to 5/5 to ascend 1 flight of stairs reciprocally without UE assist PROGRESSING    Pt will increase hip and knee strength to grossly 4+/5 to be able to get up and down from the floor safely PROGRESSING    Pt will demonstrate increased hip ER/ABD strength to 4+/5 to perform stepping and squatting activities without excessive femoral IR/ADD PROGRESSING    Pt will improve SLS to >10 s to improve safety with gait on uneven surfaces such as grass and gravel PROGRESSING    Pt will be independent and compliant with comprehensive HEP to maintain progress achieved in PT   PROGRESSING      Plan: Continue per plan of care , recommend continued PT with emphasis on strength, endurance, function mobility mil stairs   LEFS Score  LEFS Score: (Patient-Rptd) 53.75 % (11/8/2024 11:12 AM)    Post LEFS Score  Post LEFS Score: (Patient-Rptd) 43.75 % (1/7/2025  3:05 PM)    -10 % improvement   Date: 11/15/2024  TX#: 2/8 Date:        11/19/24         TX#: 3/ Date:      11/22/2024      TX#: 4/ Date:        11/29/2024          TX#: 5/ Date: 12/5/24  Tx#: 6/ Date: 12/20/24  Tx#: 7/ Date: 12/24/24  Tx#: 8/ Date: 1/7/2025   Tx#: 9  PN WRITTEN Date: 1/17/2025   Tx#: 10  1/10 1/27/2025  Tx 11  2/10 Date: 1/30/2025   Tx#: 12  3/10      NuStep x5'  NuStep x5'  NuStep x5'  NuStep x5'  NuStep L5x5' NuStep L5x5' NuStep L5x5' NuStep L5x5' NuStep L5x5' NuStep L5x5' NuStep L5x5'    TE:   Pilate's ring x20 hip ab/hip add x20   Supine SAQ x20 2#   Standing:   Airex slb with ft hold x5 each LE   Side steps x4 laps red TB FT hold   BW red tc x10   Shuttle x20 (with ball )   25#   X15 single leg  TE:   Grn TB BKFO x20   Bridge with band/ball x20   Supine SAQ x20 2#   Standing:   Airex slb with ft hold x5 each LE   Side steps x4 laps red TB FT hold   BW red tc x10   Shuttle x20 (with ball )   25#   X15 single leg  Shuttle x20 (with ball )   50 #   X15 single leg 25# right   12#left x20   Balance board x2' each direction   TC side steps x10 red   BW x10   TE:   Grn TB BKFO x20   Bridge with  band/ball x20    Shuttle x20 (with ball )   50 #   X15 single leg 25# right   12#left x20   Balance board x2' each direction   TC side steps x10 red   BW x10   TE:   Grn TB BKFO x20   Bridge with  band/ball x20   Piliates ring add x10  Therex: in // 4\" fsu w/ RTB ER x15 each B  Standing mini-squat w/ GTB ER 2x10    Supine EOB hip flexor/quad stretch w/ strap 2x30\" each B  Kt taping L knee    (25 min) Therex: in // 4\" fsu w/ RTB ER x15 each B  Standing mini-squat w/ GTB ER 2x10    TE:   Grn TB BKFO x20   Bridge with  band/ball x20      TC side steps x10 red   BW x10  Therex: in //  4\" fsu w/ x15 each B  Standing mini-squat w/ GTB ER 2x10    TKEs red TB x20     TC side steps x10 red   BW x10   Shuttle x20 B 50# squats   Shuttle x15 VMO emphasis   Single leg x10 25 #  TC side steps x10 red   BW x10 red TC   Shuttle x20 B 50# squats   Pilate's ring x20 hip ab/hip add   4\" fsu w/ RTB ER x15 each B     NMR:   Pilate's ring x20 hip ab/hip add each component   Bridge with pilate's ring x10          Step up>over>down 4\" x10 each cues for proper knee position and lateral pull with red TB   TKEs' red tb x2x10   BW x10 red TC  NMR:   Pilate's ring x20 hip ab/hip add each component   Bridge with pilate's ring x10     Step up>over>down 4\" x10 each cues for proper knee position and lateral pull with red TB  NMR:   Pilate's ring x20 hip ab/hip ,add each component   Bridge with pilate's ring x10   TKE's with ball  behind knee bilaterally 2x10    Heel raises x2x10 on airex       MT: STM To b PF with joint mobs medial>lateral glides x10'  MT: STM To b PF with joint mobs medial>lateral glides x10'  MT: STM To b PF with joint mobs medial>lateral glides x10'  MT: STM To b PF with joint mobs medial>lateral glides x10'  Manual: (20 min)  Patellar mobs all planes B  STM to quad B  Hip flexor/quad foam roll x2' each B Manual: (20 min)  Patellar mobs all planes B  STM to quad B  Hip flexor/quad foam roll x2' each B Manual: (20 min)  Patellar mobs all planes B  STM to quad B     Manual: (20 min)  Patellar mobs all planes B  STM to quad B  Hip flexor/quad foam roll x2' each B Manual: (10 min)  Patellar mobs all planes B Manual: (10 min)  Patellar mobs all planes B Manual: (10 min)  Patellar mobs all planes B            TE:   Shuttle x10 31# squats   Shuttle x10 with ball for VMO   Shuttle x10 single leg 25# bilaterally TE:   Shuttle x10 31# squats   Shuttle x10 with ball for VMO   Shuttle x10 single leg 25# bilaterally TE:   Shuttle x10 31# squats   Shuttle x10 with ball  for VMO   Shuttle x10 single leg 25# bilaterally    HEP: Access Code: [email or web address]  URL: [email or web address]  Date: 11/08/2024  Prepared by: Sol Alford    Exercises  - Clamshell  - 1 x daily - 7 x weekly - 2 sets - 10 reps  - Sidelying Hip Abduction  - 1 x daily - 7 x weekly - 2 sets - 10 reps  - Supine Active Straight Leg Raise  - 1 x daily - 7 x weekly - 2 sets - 10 reps  - Straight Leg Raise with External Rotation  - 1 x daily - 7 x weekly - 2 sets - 10 reps      Charges: 1 MT1 TE 1 NMR       Total Timed Treatment: 45 min  Total Treatment Time: 45 min

## 2025-02-03 ENCOUNTER — OFFICE VISIT (OUTPATIENT)
Dept: PHYSICAL THERAPY | Facility: HOSPITAL | Age: 70
End: 2025-02-03
Attending: INTERNAL MEDICINE
Payer: MEDICARE

## 2025-02-03 PROCEDURE — 97140 MANUAL THERAPY 1/> REGIONS: CPT | Performed by: PHYSICAL THERAPIST

## 2025-02-03 PROCEDURE — 97110 THERAPEUTIC EXERCISES: CPT | Performed by: PHYSICAL THERAPIST

## 2025-02-03 PROCEDURE — 97112 NEUROMUSCULAR REEDUCATION: CPT | Performed by: PHYSICAL THERAPIST

## 2025-02-03 NOTE — PROGRESS NOTES
Diagnosis:   Acute pain of both knees (M25.561,M25.562)  Osteoarthritis of right knee, unspecified osteoarthritis type (M17.11)            Referring Provider: No ref. provider found  Date of Evaluation:    11/8/24    Precautions:  None Next MD visit:   none scheduled  Date of Surgery: n/a   Insurance Primary/Secondary: MEDICARE / BCBS [employer] WARE     # Auth Visits: 10         Subjective: right knee pain is better today .  The left knee continues to feel stronger overall , but has noted that she is also getting in and out of car easier.  States that she is going on a walking type of trip in April and wants her knees to be strong to help with walking and being able to enjoy her trip.   Pain: R knee 8/10; L knee 7/10 with activities like turning or stair negotiation      Objective: Treatment per flow sheet.  Ecc control 3+/5 overall bilateral knee left , 4-/5 right.   Discussed with pt use of a neoprene knee sleeve for support and warmth when she is going to be traveling . .     Assessment: She is encouraged by her progress mil with her functional increase in her strength mil with stairs, getting in and out of car . She is improving with her strength overall and is hopeful that her knee strength will improve when she is traveling.   Goals:   Pt will improve knee extension ROM to 0 deg to allow proper heel strike during gait and terminal knee extension in stance PROGRESSING    Pt will improve knee AROM flexion to improve ability to perform bending and lifting activities such as cleaning, vacuuming  PROGRESSING    Pt will improve quad strength to 5/5 to ascend 1 flight of stairs reciprocally without UE assist PROGRESSING    Pt will increase hip and knee strength to grossly 4+/5 to be able to get up and down from the floor safely PROGRESSING    Pt will demonstrate increased hip ER/ABD strength to 4+/5 to perform stepping and squatting activities without excessive femoral IR/ADD PROGRESSING    Pt will improve SLS to >10  s to improve safety with gait on uneven surfaces such as grass and gravel PROGRESSING    Pt will be independent and compliant with comprehensive HEP to maintain progress achieved in PT  PROGRESSING      Plan: Continue per plan of care , recommend continued PT with emphasis on strength, endurance, function mobility mil stairs   LEFS Score  LEFS Score: (Patient-Rptd) 53.75 % (11/8/2024 11:12 AM)    Post LEFS Score  Post LEFS Score: (Patient-Rptd) 43.75 % (1/7/2025  3:05 PM)    -10 % improvement   Date: 11/15/2024  TX#: 2/8 Date:        11/19/24         TX#: 3/ Date:      11/22/2024      TX#: 4/ Date:        11/29/2024          TX#: 5/ Date: 12/5/24  Tx#: 6/ Date: 12/20/24  Tx#: 7/ Date: 12/24/24  Tx#: 8/ Date: 1/7/2025   Tx#: 9  PN WRITTEN Date: 1/17/2025   Tx#: 10  1/10 1/27/2025  Tx 11  2/10 Date: 1/30/2025   Tx#: 12  3/10 Date: 2/3/2025   Tx#: 13       NuStep x5'  NuStep x5'  NuStep x5'  NuStep x5'  NuStep L5x5' NuStep L5x5' NuStep L5x5' NuStep L5x5' NuStep L5x5' NuStep L5x5' NuStep L5x5' NuStep L5x5'   TE:   Pilate's ring x20 hip ab/hip add x20   Supine SAQ x20 2#   Standing:   Airex slb with ft hold x5 each LE   Side steps x4 laps red TB FT hold   BW red tc x10   Shuttle x20 (with ball )   25#   X15 single leg  TE:   Grn TB BKFO x20   Bridge with band/ball x20   Supine SAQ x20 2#   Standing:   Airex slb with ft hold x5 each LE   Side steps x4 laps red TB FT hold   BW red tc x10   Shuttle x20 (with ball )   25#   X15 single leg  Shuttle x20 (with ball )   50 #   X15 single leg 25# right   12#left x20   Balance board x2' each direction   TC side steps x10 red   BW x10   TE:   Grn TB BKFO x20   Bridge with  band/ball x20    Shuttle x20 (with ball )   50 #   X15 single leg 25# right   12#left x20   Balance board x2' each direction   TC side steps x10 red   BW x10   TE:   Grn TB BKFO x20   Bridge with  band/ball x20   Piliates ring add x10  Therex: in // 4\" fsu w/ RTB ER x15 each B  Standing mini-squat w/ GTB ER  2x10    Supine EOB hip flexor/quad stretch w/ strap 2x30\" each B  Kt taping L knee    (25 min) Therex: in //  4\" fsu w/ RTB ER x15 each B  Standing mini-squat w/ GTB ER 2x10    TE:   Grn TB BKFO x20   Bridge with  band/ball x20     TC side steps x10 red   BW x10  Therex: in //  4\" fsu w/ x15 each B  Standing mini-squat w/ GTB ER 2x10    TKEs red TB x20     TC side steps x10 red   BW x10   Shuttle x20 B 50# squats   Shuttle x15 VMO emphasis   Single leg x10 25 #  TC side steps x10 red   BW x10 red TC   Shuttle x20 B 50# squats   Pilate's ring x20 hip ab/hip add   4\" fsu w/ RTB ER x15 each B     NMR:   Pilate's ring x20 hip ab/hip add each component   Bridge with pilate's ring x10          Step up>over>down 4\" x10 each cues for proper knee position and lateral pull with red TB   TKEs' red tb x2x10   BW x10 red TC  NMR:   Pilate's ring x20 hip ab/hip add each component   Bridge with pilate's ring x10     Step up>over>down 4\" x10 each cues for proper knee position and lateral pull with red TB  NMR:   Pilate's ring x20 hip ab/hip ,add each component   Bridge with pilate's ring x10   TKE's with ball  behind knee bilaterally 2x10    Heel raises x2x10 on airex    NMR:   Pilate's ring x20 hip ab/hip ,add each component   Bridge with pilate's ring x10   TKE's with ball  behind knee bilaterally 2x10  Step up>over>down 4\" x10 each cues for proper knee position and lateral pull with red TB   BW w/ gray TB  x10  Side steps blue TC x10     MT: STM To b PF with joint mobs medial>lateral glides x10'  MT: STM To b PF with joint mobs medial>lateral glides x10'  MT: STM To b PF with joint mobs medial>lateral glides x10'  MT: STM To b PF with joint mobs medial>lateral glides x10'  Manual: (20 min)  Patellar mobs all planes B  STM to quad B  Hip flexor/quad foam roll x2' each B Manual: (20 min)  Patellar mobs all planes B  STM to quad B  Hip flexor/quad foam roll x2' each B Manual: (20 min)  Patellar mobs all planes B  STM to quad B      Manual: (20 min)  Patellar mobs all planes B  STM to quad B  Hip flexor/quad foam roll x2' each B Manual: (10 min)  Patellar mobs all planes B Manual: (10 min)  Patellar mobs all planes B Manual: (10 min)  Patellar mobs all planes B Manual: (10 min)  Patellar mobs all planes B           TE:   Shuttle x10 31# squats   Shuttle x10 with ball for VMO   Shuttle x10 single leg 25# bilaterally TE:   Shuttle x10 31# squats   Shuttle x10 with ball for VMO   Shuttle x10 single leg 25# bilaterally TE:   Shuttle x10 31# squats   Shuttle x10 with ball for VMO   Shuttle x10 single leg 25# bilaterally TE:   Shuttle x10 31# squats   Shuttle x10 with ball for VMO   Shuttle x10 single leg 25# bilaterally   HEP: Access Code: [email or web address]  URL: [email or web address]  Date: 11/08/2024  Prepared by: Sol Alford    Exercises  - Clamshell  - 1 x daily - 7 x weekly - 2 sets - 10 reps  - Sidelying Hip Abduction  - 1 x daily - 7 x weekly - 2 sets - 10 reps  - Supine Active Straight Leg Raise  - 1 x daily - 7 x weekly - 2 sets - 10 reps  - Straight Leg Raise with External Rotation  - 1 x daily - 7 x weekly - 2 sets - 10 reps      Charges: 1 MT1 TE 1 NMR       Total Timed Treatment: 45 min  Total Treatment Time: 45 min

## 2025-02-10 ENCOUNTER — OFFICE VISIT (OUTPATIENT)
Dept: PHYSICAL THERAPY | Facility: HOSPITAL | Age: 70
End: 2025-02-10
Attending: INTERNAL MEDICINE
Payer: MEDICARE

## 2025-02-10 PROCEDURE — 97140 MANUAL THERAPY 1/> REGIONS: CPT | Performed by: PHYSICAL THERAPIST

## 2025-02-10 PROCEDURE — 97112 NEUROMUSCULAR REEDUCATION: CPT | Performed by: PHYSICAL THERAPIST

## 2025-02-10 PROCEDURE — 97110 THERAPEUTIC EXERCISES: CPT | Performed by: PHYSICAL THERAPIST

## 2025-02-10 NOTE — PROGRESS NOTES
Diagnosis:   Acute pain of both knees (M25.561,M25.562)  Osteoarthritis of right knee, unspecified osteoarthritis type (M17.11)            Referring Provider: No ref. provider found  Date of Evaluation:    11/8/24    Precautions:  None Next MD visit:   none scheduled  Date of Surgery: n/a   Insurance Primary/Secondary: [place]IC[place] / [place]     # Auth Visits: 10         Subjective: Knees are feeling stronger   Notes that stairs is hard going up , and notes more discomfort in the right Le when going down, but def. Better than before.  Doesn't   Pain: 5/10 in both knees today    Objective: Treatment per flow sheet.      Assessment: continues to be weak and below baseline with general LE strength and endurance. Is continuing to tolerate however increased overall strength and endurance with increased weights with exercise.   Goals:   Pt will improve knee extension ROM to 0 deg to allow proper heel strike during gait and terminal knee extension in stance PROGRESSING    Pt will improve knee AROM flexion to improve ability to perform bending and lifting activities such as cleaning, vacuuming  PROGRESSING    Pt will improve quad strength to 5/5 to ascend 1 flight of stairs reciprocally without UE assist PROGRESSING    Pt will increase hip and knee strength to grossly 4+/5 to be able to get up and down from the floor safely PROGRESSING    Pt will demonstrate increased hip ER/ABD strength to 4+/5 to perform stepping and squatting activities without excessive femoral IR/ADD PROGRESSING    Pt will improve SLS to >10 s to improve safety with gait on uneven surfaces such as grass and gravel PROGRESSING    Pt will be independent and compliant with comprehensive HEP to maintain progress achieved in PT  PROGRESSING      Plan: Continue per plan of care , recommend continued PT with emphasis on strength, endurance, function mobility mil stairs   LEFS Score  LEFS Score: (Patient-Rptd) 53.75 % (11/8/2024 11:12 AM)    Post LEFS  Score  Post LEFS Score: (Patient-Rptd) 43.75 % (1/7/2025  3:05 PM)    -10 % improvement   Date: 11/15/2024  TX#: 2/8 Date:        11/19/24         TX#: 3/ Date:      11/22/2024      TX#: 4/ Date:        11/29/2024          TX#: 5/ Date: 12/5/24  Tx#: 6/ Date: 12/20/24  Tx#: 7/ Date: 12/24/24  Tx#: 8/ Date: 1/7/2025   Tx#: 9  PN WRITTEN Date: 1/17/2025   Tx#: 10  1/10 1/27/2025  Tx 11  2/10 Date: 1/30/2025   Tx#: 12  3/10 Date: 2/3/2025   Tx#: 13     Date: 2/10/2025   Tx#:      NuStep x5'  NuStep x5'  NuStep x5'  NuStep x5'  NuStep L5x5' NuStep L5x5' NuStep L5x5' NuStep L5x5' NuStep L5x5' NuStep L5x5' NuStep L5x5' NuStep L5x5' NuStep L5x5'   TE:   Pilate's ring x20 hip ab/hip add x20   Supine SAQ x20 2#   Standing:   Airex slb with ft hold x5 each LE   Side steps x4 laps red TB FT hold   BW red tc x10   Shuttle x20 (with ball )   25#   X15 single leg  TE:   Grn TB BKFO x20   Bridge with band/ball x20   Supine SAQ x20 2#   Standing:   Airex slb with ft hold x5 each LE   Side steps x4 laps red TB FT hold   BW red tc x10   Shuttle x20 (with ball )   25#   X15 single leg  Shuttle x20 (with ball )   50 #   X15 single leg 25# right   12#left x20   Balance board x2' each direction   TC side steps x10 red   BW x10   TE:   Grn TB BKFO x20   Bridge with  band/ball x20    Shuttle x20 (with ball )   50 #   X15 single leg 25# right   12#left x20   Balance board x2' each direction   TC side steps x10 red   BW x10   TE:   Grn TB BKFO x20   Bridge with  band/ball x20   Piliates ring add x10  Therex: in // 4\" fsu w/ RTB ER x15 each B  Standing mini-squat w/ GTB ER 2x10    Supine EOB hip flexor/quad stretch w/ strap 2x30\" each B  Kt taping L knee    (25 min) Therex: in // 4\" fsu w/ RTB ER x15 each B  Standing mini-squat w/ GTB ER 2x10    TE:   Grn TB BKFO x20   Bridge with  band/ball x20     TC side steps x10 red   BW x10  Therex: in // 4\" fsu w/ x15 each B  Standing mini-squat w/ GTB ER 2x10    TKEs red TB x20     TC side steps  x10 red   BW x10   Shuttle x20 B 50# squats   Shuttle x15 VMO emphasis   Single leg x10 25 #  TC side steps x10 red   BW x10 red TC   Shuttle x20 B 50# squats   Pilate's ring x20 hip ab/hip add   4\" fsu w/ RTB ER x15 each B     NMR:   Pilate's ring x20 hip ab/hip add each component   Bridge with pilate's ring x10          Step up>over>down 4\" x10 each cues for proper knee position and lateral pull with red TB   TKEs' red tb x2x10   BW x10 red TC  NMR:   Pilate's ring x20 hip ab/hip add each component   Bridge with pilate's ring x10     Step up>over>down 4\" x10 each cues for proper knee position and lateral pull with red TB  NMR:   Pilate's ring x20 hip ab/hip ,add each component   Bridge with pilate's ring x10   TKE's with ball  behind knee bilaterally 2x10    Heel raises x2x10 on airex    NMR:   Pilate's ring x20 hip ab/hip ,add each component   Bridge with pilate's ring x10   TKE's with ball  behind knee bilaterally 2x10  Step up>over>down 4\" x10 each cues for proper knee position and lateral pull with red TB   BW w/ gray TB  x10  Side steps blue TC x10   NMR:   Pilate's ring x20 hip ab/hip ,add each component   Bridge with pilate's ring x10   TKEs x2x10 red TB   Step up>over>down 4\" x10 each cues for proper knee position and lateral pull with red TB   BW w/ gray TB  x10  Side steps blue TC x10     MT: STM To b PF with joint mobs medial>lateral glides x10'  MT: STM To b PF with joint mobs medial>lateral glides x10'  MT: STM To b PF with joint mobs medial>lateral glides x10'  MT: STM To b PF with joint mobs medial>lateral glides x10'  Manual: (20 min)  Patellar mobs all planes B  STM to quad B  Hip flexor/quad foam roll x2' each B Manual: (20 min)  Patellar mobs all planes B  STM to quad B  Hip flexor/quad foam roll x2' each B Manual: (20 min)  Patellar mobs all planes B  STM to quad B     Manual: (20 min)  Patellar mobs all planes B  STM to quad B  Hip flexor/quad foam roll x2' each B Manual: (10 min)  Patellar  mobs all planes B Manual: (10 min)  Patellar mobs all planes B Manual: (10 min)  Patellar mobs all planes B Manual: (10 min)  Patellar mobs all planes B Manual: (10 min)  Patellar mobs all planes B           TE:   Shuttle x10 31# squats   Shuttle x10 with ball for VMO   Shuttle x10 single leg 25# bilaterally TE:   Shuttle x10 31# squats   Shuttle x10 with ball for VMO   Shuttle x10 single leg 25# bilaterally TE:   Shuttle x10 31# squats   Shuttle x10 with ball for VMO   Shuttle x10 single leg 25# bilaterally TE:   Shuttle x10 31# squats   Shuttle x10 with ball for VMO   Shuttle x10 single leg 25# bilaterally TE:   Shuttle x10 31# squats   Shuttle x10 with ball for VMO   Shuttle x10 single leg 25# bilaterally   HEP: Access Code: [email or web address]  URL: [email or web address]  Date: 11/08/2024  Prepared by: Sol Alford    Exercises  - Clamshell  - 1 x daily - 7 x weekly - 2 sets - 10 reps  - Sidelying Hip Abduction  - 1 x daily - 7 x weekly - 2 sets - 10 reps  - Supine Active Straight Leg Raise  - 1 x daily - 7 x weekly - 2 sets - 10 reps  - Straight Leg Raise with External Rotation  - 1 x daily - 7 x weekly - 2 sets - 10 reps      Charges: 1 MT1 TE 1 NMR       Total Timed Treatment: 45 min  Total Treatment Time: 45 min

## 2025-02-24 ENCOUNTER — LAB ENCOUNTER (OUTPATIENT)
Dept: LAB | Age: 70
End: 2025-02-24
Attending: STUDENT IN AN ORGANIZED HEALTH CARE EDUCATION/TRAINING PROGRAM
Payer: MEDICARE

## 2025-02-24 DIAGNOSIS — M85.89 OSTEOPENIA OF MULTIPLE SITES: ICD-10-CM

## 2025-02-24 DIAGNOSIS — E03.8 SUBCLINICAL HYPOTHYROIDISM: ICD-10-CM

## 2025-02-24 LAB
ALBUMIN SERPL-MCNC: 4.6 G/DL (ref 3.2–4.8)
ANION GAP SERPL CALC-SCNC: 7 MMOL/L (ref 0–18)
BUN BLD-MCNC: 20 MG/DL (ref 9–23)
CALCIUM BLD-MCNC: 9.5 MG/DL (ref 8.7–10.6)
CHLORIDE SERPL-SCNC: 98 MMOL/L (ref 98–112)
CO2 SERPL-SCNC: 29 MMOL/L (ref 21–32)
CREAT BLD-MCNC: 0.71 MG/DL
EGFRCR SERPLBLD CKD-EPI 2021: 92 ML/MIN/1.73M2 (ref 60–?)
GLUCOSE BLD-MCNC: 86 MG/DL (ref 70–99)
OSMOLALITY SERPL CALC.SUM OF ELEC: 280 MOSM/KG (ref 275–295)
PHOSPHATE SERPL-MCNC: 3.8 MG/DL (ref 2.4–5.1)
POTASSIUM SERPL-SCNC: 4.4 MMOL/L (ref 3.5–5.1)
PTH-INTACT SERPL-MCNC: 42.3 PG/ML (ref 18.5–88)
SODIUM SERPL-SCNC: 134 MMOL/L (ref 136–145)
T4 FREE SERPL-MCNC: 0.9 NG/DL (ref 0.8–1.7)
THYROGLOB SERPL-MCNC: <15 U/ML (ref ?–60)
THYROPEROXIDASE AB SERPL-ACNC: 30 U/ML (ref ?–60)
TSI SER-ACNC: 5.35 UIU/ML (ref 0.55–4.78)
VIT D+METAB SERPL-MCNC: 66.3 NG/ML (ref 30–100)

## 2025-02-24 PROCEDURE — 86376 MICROSOMAL ANTIBODY EACH: CPT

## 2025-02-24 PROCEDURE — 80069 RENAL FUNCTION PANEL: CPT

## 2025-02-24 PROCEDURE — 83970 ASSAY OF PARATHORMONE: CPT

## 2025-02-24 PROCEDURE — 82306 VITAMIN D 25 HYDROXY: CPT

## 2025-02-24 PROCEDURE — 36415 COLL VENOUS BLD VENIPUNCTURE: CPT

## 2025-02-24 PROCEDURE — 84439 ASSAY OF FREE THYROXINE: CPT

## 2025-02-24 PROCEDURE — 82523 COLLAGEN CROSSLINKS: CPT

## 2025-02-24 PROCEDURE — 86800 THYROGLOBULIN ANTIBODY: CPT

## 2025-02-24 PROCEDURE — 84443 ASSAY THYROID STIM HORMONE: CPT

## 2025-02-27 LAB — C-TELOPEPTIDE: 717 PG/ML

## 2025-02-28 ENCOUNTER — OFFICE VISIT (OUTPATIENT)
Dept: PHYSICAL THERAPY | Facility: HOSPITAL | Age: 70
End: 2025-02-28
Attending: INTERNAL MEDICINE
Payer: MEDICARE

## 2025-02-28 PROCEDURE — 97112 NEUROMUSCULAR REEDUCATION: CPT | Performed by: PHYSICAL THERAPIST

## 2025-02-28 PROCEDURE — 97140 MANUAL THERAPY 1/> REGIONS: CPT | Performed by: PHYSICAL THERAPIST

## 2025-02-28 PROCEDURE — 97110 THERAPEUTIC EXERCISES: CPT | Performed by: PHYSICAL THERAPIST

## 2025-02-28 NOTE — PROGRESS NOTES
Diagnosis:   Acute pain of both knees (M25.561,M25.562)  Osteoarthritis of right knee, unspecified osteoarthritis type (M17.11)            Referring Provider: No ref. provider found  Date of Evaluation:    11/8/24    Precautions:  None Next MD visit:   none scheduled  Date of Surgery: n/a   Insurance Primary/Secondary: [place]IC[place] / [place]     # Auth Visits: 10         Subjective: Notes pain in the left lateral thigh, (points to the ITB ).  Getting ready for my trip at the end of the month, hoping that my knee pain isn't as bad that I wont be able to walk .   Pain: 5/10 in both knees today    Objective: Treatment per flow sheet.  Palpation with tenderness along lateral aspect of the left ITB .  Added modified radha stretch to hep and treatment here for stretching.  Needs cues to avoid IR of hip with step downs mil with left LE.    Assessment: Mechanically continues  to have excessive IR with step downs, but responds well to cues to perform without the rotation.of the thigh.  Subjectively she is noting an improvement in her strength and endurance when going down stairs.     Goals:   Pt will improve knee extension ROM to 0 deg to allow proper heel strike during gait and terminal knee extension in stance PROGRESSING    Pt will improve knee AROM flexion to improve ability to perform bending and lifting activities such as cleaning, vacuuming  PROGRESSING    Pt will improve quad strength to 5/5 to ascend 1 flight of stairs reciprocally without UE assist PROGRESSING    Pt will increase hip and knee strength to grossly 4+/5 to be able to get up and down from the floor safely PROGRESSING    Pt will demonstrate increased hip ER/ABD strength to 4+/5 to perform stepping and squatting activities without excessive femoral IR/ADD PROGRESSING    Pt will improve SLS to >10 s to improve safety with gait on uneven surfaces such as grass and gravel PROGRESSING    Pt will be independent and compliant with comprehensive  HEP to maintain progress achieved in PT  PROGRESSING      Plan: Continue per plan of care , recommend continued PT with emphasis on strength, endurance, function mobility mil stairs   LEFS Score  LEFS Score: (Patient-Rptd) 53.75 % (11/8/2024 11:12 AM)    Post LEFS Score  Post LEFS Score: (Patient-Rptd) 43.75 % (1/7/2025  3:05 PM)    -10 % improvement   Date: 1/7/2025   Tx#: 9  PN WRITTEN Date: 1/17/2025   Tx#: 10  1/10 1/27/2025  Tx 11  2/10 Date: 1/30/2025   Tx#: 12  3/10 Date: 2/3/2025   Tx#: 13     Date: 2/10/2025   Tx#: 14   Date: 2/28/2025   Tx#: 15     NuStep L5x5' NuStep L5x5' NuStep L5x5' NuStep L5x5' NuStep L5x5' NuStep L5x5' NuStep L5x5'  seat 6   TC side steps x10 red   BW x10 red TC   Shuttle x20 B 50# squats   Pilate's ring x20 hip ab/hip add   4\" fsu w/ RTB ER x15 each B     NMR:   Pilate's ring x20 hip ab/hip add each component   Bridge with pilate's ring x10          Step up>over>down 4\" x10 each cues for proper knee position and lateral pull with red TB   TKEs' red tb x2x10   BW x10 red TC  NMR:   Pilate's ring x20 hip ab/hip add each component   Bridge with pilate's ring x10     Step up>over>down 4\" x10 each cues for proper knee position and lateral pull with red TB  NMR:   Pilate's ring x20 hip ab/hip ,add each component   Bridge with pilate's ring x10   TKE's with ball  behind knee bilaterally 2x10    Heel raises x2x10 on airex    NMR:   Pilate's ring x20 hip ab/hip ,add each component   Bridge with pilate's ring x10   TKE's with ball  behind knee bilaterally 2x10  Step up>over>down 4\" x10 each cues for proper knee position and lateral pull with red TB   BW w/ gray TB  x10  Side steps blue TC x10   NMR:   Pilate's ring x20 hip ab/hip ,add each component   Bridge with pilate's ring x10   TKEs x2x10 red TB   Step up>over>down 4\" x10 each cues for proper knee position and lateral pull with red TB   BW w/ gray TB  x10  Side steps blue TC x10    NMR:   Step up>over>down 4\" x10 each cues for proper  knee position    EZ slide hip extension x5    BW w/ gray TB  x10  Side steps blue TC x10      Manual: (20 min)  Patellar mobs all planes B  STM to quad B  Hip flexor/quad foam roll x2' each B Manual: (10 min)  Patellar mobs all planes B Manual: (10 min)  Patellar mobs all planes B Manual: (10 min)  Patellar mobs all planes B Manual: (10 min)  Patellar mobs all planes B Manual: (10 min)  Patellar mobs all planes B Manual: (10 min)  Patellar mobs all planes B    TE:   Shuttle x10 31# squats   Shuttle x10 with ball for VMO   Shuttle x10 single leg 25# bilaterally TE:   Shuttle x10 31# squats   Shuttle x10 with ball for VMO   Shuttle x10 single leg 25# bilaterally TE:   Shuttle x10 31# squats   Shuttle x10 with ball for VMO   Shuttle x10 single leg 25# bilaterally TE:   Shuttle x10 31# squats   Shuttle x10 with ball for VMO   Shuttle x10 single leg 25# bilaterally TE:   Shuttle x10 31# squats   Shuttle x10 with ball for VMO   Shuttle x10 single leg 25# bilaterally TE:   Shuttle x10 31# squats   Shuttle x10 with ball for VMO   Shuttle x10 single leg 25# bilaterally   HEP: Access Code: [email or web address]  URL: [email or web address]  Date: 11/08/2024  Prepared by: Sol Alford    Exercises  - Clamshell  - 1 x daily - 7 x weekly - 2 sets - 10 reps  - Sidelying Hip Abduction  - 1 x daily - 7 x weekly - 2 sets - 10 reps  - Supine Active Straight Leg Raise  - 1 x daily - 7 x weekly - 2 sets - 10 reps  - Straight Leg Raise with External Rotation  - 1 x daily - 7 x weekly - 2 sets - 10 reps      Charges: 1 MT1 TE 1 NMR       Total Timed Treatment: 45 min  Total Treatment Time: 45 min

## 2025-03-03 ENCOUNTER — APPOINTMENT (OUTPATIENT)
Dept: PHYSICAL THERAPY | Facility: HOSPITAL | Age: 70
End: 2025-03-03
Payer: MEDICARE

## 2025-03-06 ENCOUNTER — OFFICE VISIT (OUTPATIENT)
Facility: CLINIC | Age: 70
End: 2025-03-06
Payer: MEDICARE

## 2025-03-06 VITALS
OXYGEN SATURATION: 94 % | SYSTOLIC BLOOD PRESSURE: 120 MMHG | DIASTOLIC BLOOD PRESSURE: 76 MMHG | WEIGHT: 116 LBS | HEIGHT: 65 IN | HEART RATE: 75 BPM | BODY MASS INDEX: 19.33 KG/M2

## 2025-03-06 DIAGNOSIS — M85.89 OSTEOPENIA OF MULTIPLE SITES: Primary | ICD-10-CM

## 2025-03-06 DIAGNOSIS — E03.8 SUBCLINICAL HYPOTHYROIDISM: ICD-10-CM

## 2025-03-06 PROCEDURE — 99214 OFFICE O/P EST MOD 30 MIN: CPT | Performed by: STUDENT IN AN ORGANIZED HEALTH CARE EDUCATION/TRAINING PROGRAM
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
## 2025-03-06 NOTE — PATIENT INSTRUCTIONS
Visit Summary  Please complete your bone density and thyroid labs prior to your November 2025 follow-up  Continue taking calcium and vitamin D daily    General follow up information:  Please let us know if you require any refills at least 1 week prior to your medication running out. If you do run out of medication, please call our office ASAP to request refills (do not wait until your follow up).  Please call us if you experience any problems with insurance coverage of medication, lab work, or imaging.   Lab results and imaging will typically be reviewed at follow up appointments, or within 3-5 business days of ALL results being in if you do not have an appointment scheduled in the near future. Our office will contact you for any abnormal results requiring more urgent follow up or action.  The on-call pager is for urgent matters only. If you are a type 1 diabetic and run out of insulin after business hours 8AM-4PM, you may call the on-call pager for a refill to a 24 hour pharmacy. If you have adrenal insufficiency and run out of steroids, you may call the on-call pager for a refill to a 24 hours pharmacy. All other refill requests should be requested during business hours.    Return Visit   [x] Dr. Conway in 8.5 months   [] Virtual visit  [] No follow up appointment needed  [] Follow up to be scheduled pending      []  Fasting/8AM labs  [x]  Central scheduling # for ultrasound/nuclear med/CT/MRI/DXA/IR  []  Provide flyer for:  [] ENT   [] Weight Management  [] Infertility/Reproductive Endocrinology  [] Transgender care  []  Directions to 1st floor lab  [] Collect urine collection jug  [] Collect salivary cortisol tubes  []  Dental clearance form  []  Will need authorization for outside records

## 2025-03-07 ENCOUNTER — OFFICE VISIT (OUTPATIENT)
Dept: PHYSICAL THERAPY | Facility: HOSPITAL | Age: 70
End: 2025-03-07
Attending: INTERNAL MEDICINE
Payer: MEDICARE

## 2025-03-07 PROCEDURE — 97140 MANUAL THERAPY 1/> REGIONS: CPT | Performed by: PHYSICAL THERAPIST

## 2025-03-07 PROCEDURE — 97110 THERAPEUTIC EXERCISES: CPT | Performed by: PHYSICAL THERAPIST

## 2025-03-07 PROCEDURE — 97112 NEUROMUSCULAR REEDUCATION: CPT | Performed by: PHYSICAL THERAPIST

## 2025-03-07 NOTE — PROGRESS NOTES
Diagnosis:   Acute pain of both knees (M25.561,M25.562)  Osteoarthritis of right knee, unspecified osteoarthritis type (M17.11)            Referring Provider: No ref. provider found  Date of Evaluation:    11/8/24    Precautions:  None Next MD visit:   none scheduled  Date of Surgery: n/a   Insurance Primary/Secondary: MEDICARE / [place] INDEMNITY     # Auth Visits: 10         Subjective: Continues to note pain and discomfort in her knee mil along the right knee today.  Left knee is still sore, and is alogn the medial knee at MCL   Pain: 5/10 in both knees today    Objective: Treatment per flow sheet.    TTP along the MCL insertion of the left knee, but without wamrth or swelling ot the area.     Assessment: Needs cues to continue to perfroms all LE without adduction of her hip and her knees.  Ecc control continues to be fair, and is improving overall.  General goal is to continue to stergntehng and improve her kinectic strength and endurance to help with her upcoming trip      Goals:   Pt will improve knee extension ROM to 0 deg to allow proper heel strike during gait and terminal knee extension in stance PROGRESSING    Pt will improve knee AROM flexion to improve ability to perform bending and lifting activities such as cleaning, vacuuming  PROGRESSING    Pt will improve quad strength to 5/5 to ascend 1 flight of stairs reciprocally without UE assist PROGRESSING    Pt will increase hip and knee strength to grossly 4+/5 to be able to get up and down from the floor safely PROGRESSING    Pt will demonstrate increased hip ER/ABD strength to 4+/5 to perform stepping and squatting activities without excessive femoral IR/ADD PROGRESSING    Pt will improve SLS to >10 s to improve safety with gait on uneven surfaces such as grass and gravel PROGRESSING    Pt will be independent and compliant with comprehensive HEP to maintain progress achieved in PT  PROGRESSING      Plan: Continue per plan of care , recommend  continued PT with emphasis on strength, endurance, function mobility mil stairs   LEFS Score  LEFS Score: (Patient-Rptd) 53.75 % (11/8/2024 11:12 AM)    Post LEFS Score  Post LEFS Score: (Patient-Rptd) 43.75 % (1/7/2025  3:05 PM)    -10 % improvement   Date: 1/7/2025   Tx#: 9  PN WRITTEN Date: 1/17/2025   Tx#: 10  1/10 1/27/2025  Tx 11  2/10 Date: 1/30/2025   Tx#: 12  3/10 Date: 2/3/2025   Tx#: 13     Date: 2/10/2025   Tx#: 14   Date: 2/28/2025   Tx#: 15   Date: 3/7/2025   Tx#:      NuStep L5x5' NuStep L5x5' NuStep L5x5' NuStep L5x5' NuStep L5x5' NuStep L5x5' NuStep L5x5'  seat 6 NuStep L5x5'  seat 6   TC side steps x10 red   BW x10 red TC   Shuttle x20 B 50# squats   Pilate's ring x20 hip ab/hip add   4\" fsu w/ RTB ER x15 each B     NMR:   Pilate's ring x20 hip ab/hip add each component   Bridge with pilate's ring x10          Step up>over>down 4\" x10 each cues for proper knee position and lateral pull with red TB   TKEs' red tb x2x10   BW x10 red TC  NMR:   Pilate's ring x20 hip ab/hip add each component   Bridge with pilate's ring x10     Step up>over>down 4\" x10 each cues for proper knee position and lateral pull with red TB  NMR:   Pilate's ring x20 hip ab/hip ,add each component   Bridge with pilate's ring x10   TKE's with ball  behind knee bilaterally 2x10    Heel raises x2x10 on airex    NMR:   Pilate's ring x20 hip ab/hip ,add each component   Bridge with pilate's ring x10   TKE's with ball  behind knee bilaterally 2x10  Step up>over>down 4\" x10 each cues for proper knee position and lateral pull with red TB   BW w/ gray TB  x10  Side steps blue TC x10   NMR:   Pilate's ring x20 hip ab/hip ,add each component   Bridge with pilate's ring x10   TKEs x2x10 red TB   Step up>over>down 4\" x10 each cues for proper knee position and lateral pull with red TB   BW w/ gray TB  x10  Side steps blue TC x10    NMR:   Step up>over>down 4\" x10 each cues for proper knee position    EZ slide hip extension x5    BW w/ gray TB   x10  Side steps blue TC x10    NMR:   Step up>over>down 4\" x10 each cues for proper knee position    Pilate's ring x20 hip ab/hip ,add each component   Bridge with pilate's ring x10          Manual: (20 min)  Patellar mobs all planes B  STM to quad B  Hip flexor/quad foam roll x2' each B Manual: (10 min)  Patellar mobs all planes B Manual: (10 min)  Patellar mobs all planes B Manual: (10 min)  Patellar mobs all planes B Manual: (10 min)  Patellar mobs all planes B Manual: (10 min)  Patellar mobs all planes B Manual: (10 min)  Patellar mobs all planes B Manual: (10 min)  Patellar mobs all planes B    TE:   Shuttle x10 31# squats   Shuttle x10 with ball for VMO   Shuttle x10 single leg 25# bilaterally TE:   Shuttle x10 31# squats   Shuttle x10 with ball for VMO   Shuttle x10 single leg 25# bilaterally TE:   Shuttle x10 31# squats   Shuttle x10 with ball for VMO   Shuttle x10 single leg 25# bilaterally TE:   Shuttle x10 31# squats   Shuttle x10 with ball for VMO   Shuttle x10 single leg 25# bilaterally TE:   Shuttle x10 31# squats   Shuttle x10 with ball for VMO   Shuttle x10 single leg 25# bilaterally TE:   Shuttle x10 31# squats   Shuttle x10 with ball for VMO   Shuttle x10 single leg 25# bilaterally TE:   Shuttle x10 31# squats   Shuttle x10 with ball for VMO   Shuttle x10 single leg 25# bilaterally   HEP: Access Code: [email or web address]  URL: [email or web address]  Date: 11/08/2024  Prepared by: Sol Alford    Exercises  - Clamshell  - 1 x daily - 7 x weekly - 2 sets - 10 reps  - Sidelying Hip Abduction  - 1 x daily - 7 x weekly - 2 sets - 10 reps  - Supine Active Straight Leg Raise  - 1 x daily - 7 x weekly - 2 sets - 10 reps  - Straight Leg Raise with External Rotation  - 1 x daily - 7 x weekly - 2 sets - 10 reps      Charges: 1 MT1 TE 1 NMR       Total Timed Treatment: 45 min  Total Treatment Time: 45 min

## 2025-03-10 ENCOUNTER — APPOINTMENT (OUTPATIENT)
Dept: PHYSICAL THERAPY | Facility: HOSPITAL | Age: 70
End: 2025-03-10
Payer: MEDICARE

## 2025-03-12 ENCOUNTER — HOSPITAL ENCOUNTER (OUTPATIENT)
Dept: MAMMOGRAPHY | Facility: HOSPITAL | Age: 70
Discharge: HOME | End: 2025-03-12
Attending: INTERNAL MEDICINE
Payer: MEDICARE

## 2025-03-12 DIAGNOSIS — Z12.39 ENCOUNTER FOR BREAST CANCER SCREENING USING NON-MAMMOGRAM MODALITY: ICD-10-CM

## 2025-03-12 DIAGNOSIS — R92.1 BREAST CALCIFICATIONS: ICD-10-CM

## 2025-03-12 DIAGNOSIS — R92.30 DENSE BREASTS: ICD-10-CM

## 2025-03-12 PROCEDURE — 76641 ULTRASOUND BREAST COMPLETE: CPT | Performed by: INTERNAL MEDICINE

## 2025-03-14 ENCOUNTER — OFFICE VISIT (OUTPATIENT)
Dept: PHYSICAL THERAPY | Facility: HOSPITAL | Age: 70
End: 2025-03-14
Payer: MEDICARE

## 2025-03-14 PROCEDURE — 97110 THERAPEUTIC EXERCISES: CPT | Performed by: PHYSICAL THERAPIST

## 2025-03-14 PROCEDURE — 97112 NEUROMUSCULAR REEDUCATION: CPT | Performed by: PHYSICAL THERAPIST

## 2025-03-14 PROCEDURE — 97140 MANUAL THERAPY 1/> REGIONS: CPT | Performed by: PHYSICAL THERAPIST

## 2025-03-14 NOTE — PROGRESS NOTES
Progress Summary  Pt has attended 17 visits in Physical Therapy.        Diagnosis:   Acute pain of both knees (M25.561,M25.562)  Osteoarthritis of right knee, unspecified osteoarthritis type (M17.11)            Referring Provider: No ref. provider found  Date of Evaluation:    11/8/24    Precautions:  None Next MD visit:   none scheduled  Date of Surgery: n/a   Insurance Primary/Secondary: MEDICARE / [place]DEMN[place]     # Auth Visits: 10         Subjective: Continues to note improvement overall and decreased pain in the medial aspect of the knee.  States that she is feeling a little bit stronger.   Pain: 5/10 in both knees today    Objective: Treatment per flow sheet.  Knee   Flexion: R 4/5; L 4/5  Extension: R 4/5; L 4/5          Assessment: Is improving overall, and is continue to be weak mil with ecc control based exercise.  Is continuing to be below baseline for strength and overall endurance.     Goals:   Pt will improve knee extension ROM to 0 deg to allow proper heel strike during gait and terminal knee extension in stance PROGRESSING    Pt will improve knee AROM flexion to improve ability to perform bending and lifting activities such as cleaning, vacuuming  PROGRESSING    Pt will improve quad strength to 5/5 to ascend 1 flight of stairs reciprocally without UE assist PROGRESSING    Pt will increase hip and knee strength to grossly 4+/5 to be able to get up and down from the floor safely PROGRESSING    Pt will demonstrate increased hip ER/ABD strength to 4+/5 to perform stepping and squatting activities without excessive femoral IR/ADD PROGRESSING    Pt will improve SLS to >10 s to improve safety with gait on uneven surfaces such as grass and gravel PROGRESSING    Pt will be independent and compliant with comprehensive HEP to maintain progress achieved in PT  PROGRESSING      Plan: Continue per plan of care , recommend continued PT with emphasis on strength, endurance, function mobility mil stairs   LEFS  Score  LEFS Score: (Patient-Rptd) 53.75 % (11/8/2024 11:12 AM)    Post LEFS Score  Post LEFS Score: (Patient-Rptd) 53.75 % (3/14/2025 11:24 AM)    0 % improvement   Date: 1/7/2025   Tx#: 9  PN WRITTEN Date: 1/17/2025   Tx#: 10  1/10 1/27/2025  Tx 11  2/10 Date: 1/30/2025   Tx#: 12  3/10 Date: 2/3/2025   Tx#: 13     Date: 2/10/2025   Tx#: 14   Date: 2/28/2025   Tx#: 15   Date: 3/7/2025   Tx#: 16   Date: 3/14/2025   Tx#: 17   NuStep L5x5' NuStep L5x5' NuStep L5x5' NuStep L5x5' NuStep L5x5' NuStep L5x5' NuStep L5x5'  seat 6 NuStep L5x5'  seat 6 NuStep L5x5'  seat 6   TC side steps x10 red   BW x10 red TC   Shuttle x20 B 50# squats   Pilate's ring x20 hip ab/hip add   4\" fsu w/ RTB ER x15 each B     NMR:   Pilate's ring x20 hip ab/hip add each component   Bridge with pilate's ring x10          Step up>over>down 4\" x10 each cues for proper knee position and lateral pull with red TB   TKEs' red tb x2x10   BW x10 red TC  NMR:   Pilate's ring x20 hip ab/hip add each component   Bridge with pilate's ring x10     Step up>over>down 4\" x10 each cues for proper knee position and lateral pull with red TB  NMR:   Pilate's ring x20 hip ab/hip ,add each component   Bridge with pilate's ring x10   TKE's with ball  behind knee bilaterally 2x10    Heel raises x2x10 on airex    NMR:   Pilate's ring x20 hip ab/hip ,add each component   Bridge with pilate's ring x10   TKE's with ball  behind knee bilaterally 2x10  Step up>over>down 4\" x10 each cues for proper knee position and lateral pull with red TB   BW w/ gray TB  x10  Side steps blue TC x10   NMR:   Pilate's ring x20 hip ab/hip ,add each component   Bridge with pilate's ring x10   TKEs x2x10 red TB   Step up>over>down 4\" x10 each cues for proper knee position and lateral pull with red TB   BW w/ gray TB  x10  Side steps blue TC x10    NMR:   Step up>over>down 4\" x10 each cues for proper knee position    EZ slide hip extension x5    BW w/ gray TB  x10  Side steps blue TC x10    NMR:    Step up>over>down 4\" x10 each cues for proper knee position    Pilate's ring x20 hip ab/hip ,add each component   Bridge with pilate's ring x10        NMR:   Bosu lunges x10 each   Bosu balance bilateral LE with FT hold x5 @20 sec     Pilate's ring x20 hip ab/hip ,add each component   Bridge with pilate's ring x10    Manual: (20 min)  Patellar mobs all planes B  STM to quad B  Hip flexor/quad foam roll x2' each B Manual: (10 min)  Patellar mobs all planes B Manual: (10 min)  Patellar mobs all planes B Manual: (10 min)  Patellar mobs all planes B Manual: (10 min)  Patellar mobs all planes B Manual: (10 min)  Patellar mobs all planes B Manual: (10 min)  Patellar mobs all planes B Manual: (10 min)  Patellar mobs all planes B Manual: (10 min)  Patellar mobs all planes B    TE:   Shuttle x10 31# squats   Shuttle x10 with ball for VMO   Shuttle x10 single leg 25# bilaterally TE:   Shuttle x10 31# squats   Shuttle x10 with ball for VMO   Shuttle x10 single leg 25# bilaterally TE:   Shuttle x10 31# squats   Shuttle x10 with ball for VMO   Shuttle x10 single leg 25# bilaterally TE:   Shuttle x10 31# squats   Shuttle x10 with ball for VMO   Shuttle x10 single leg 25# bilaterally TE:   Shuttle x10 31# squats   Shuttle x10 with ball for VMO   Shuttle x10 single leg 25# bilaterally TE:   Shuttle x10 31# squats   Shuttle x10 with ball for VMO   Shuttle x10 single leg 25# bilaterally TE:   Shuttle x10 31# squats   Shuttle x10 with ball for VMO   Shuttle x10 single leg 25# bilaterally TE:   Shuttle x20 50# squats   Shuttle x20 with ball for VMO   Shuttle x20 single leg 31# bilaterally   HEP: Access Code: [email or web address]  URL: [email or web address]  Date: 11/08/2024  Prepared by: Sol Alford    Exercises  - Clamshell  - 1 x daily - 7 x weekly - 2 sets - 10 reps  - Sidelying Hip Abduction  - 1 x daily - 7 x weekly - 2 sets - 10 reps  - Supine Active Straight Leg Raise  - 1 x daily - 7 x weekly - 2 sets - 10 reps  -  Straight Leg Raise with External Rotation  - 1 x daily - 7 x weekly - 2 sets - 10 reps      Charges: 1 MT1 TE 1 NMR       Total Timed Treatment: 45 min  Total Treatment Time: 45 min

## 2025-03-19 ENCOUNTER — OFFICE VISIT (OUTPATIENT)
Dept: PODIATRY CLINIC | Facility: CLINIC | Age: 70
End: 2025-03-19

## 2025-03-19 VITALS — WEIGHT: 116 LBS | HEIGHT: 65 IN | BODY MASS INDEX: 19.33 KG/M2

## 2025-03-19 DIAGNOSIS — M20.41 HAMMER TOE OF RIGHT FOOT: ICD-10-CM

## 2025-03-19 DIAGNOSIS — S99.922A INJURY OF TOENAIL OF LEFT FOOT, INITIAL ENCOUNTER: ICD-10-CM

## 2025-03-19 DIAGNOSIS — E11.9 TYPE 2 DIABETES MELLITUS WITHOUT COMPLICATION, UNSPECIFIED WHETHER LONG TERM INSULIN USE (HCC): ICD-10-CM

## 2025-03-19 DIAGNOSIS — M21.619 BUNION: ICD-10-CM

## 2025-03-19 DIAGNOSIS — L60.3 NAIL DYSTROPHY: Primary | ICD-10-CM

## 2025-03-19 DIAGNOSIS — L84 FOOT CALLUS: ICD-10-CM

## 2025-03-19 PROCEDURE — 99213 OFFICE O/P EST LOW 20 MIN: CPT | Performed by: STUDENT IN AN ORGANIZED HEALTH CARE EDUCATION/TRAINING PROGRAM
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
## 2025-03-20 ENCOUNTER — OFFICE VISIT (OUTPATIENT)
Dept: PHYSICAL THERAPY | Facility: HOSPITAL | Age: 70
End: 2025-03-20
Payer: MEDICARE

## 2025-03-20 PROCEDURE — 97110 THERAPEUTIC EXERCISES: CPT | Performed by: PHYSICAL THERAPIST

## 2025-03-20 PROCEDURE — 97140 MANUAL THERAPY 1/> REGIONS: CPT | Performed by: PHYSICAL THERAPIST

## 2025-03-20 PROCEDURE — 97112 NEUROMUSCULAR REEDUCATION: CPT | Performed by: PHYSICAL THERAPIST

## 2025-03-20 NOTE — PROGRESS NOTES
Progress Summary  Pt has attended 17 visits in Physical Therapy.        Diagnosis:   Acute pain of both knees (M25.561,M25.562)  Osteoarthritis of right knee, unspecified osteoarthritis type (M17.11)            Referring Provider: No ref. provider found  Date of Evaluation:    11/8/24    Precautions:  None Next MD visit:   none scheduled  Date of Surgery: n/a   Insurance Primary/Secondary: MEDICARE / [employer] [place] INDEMNITY     # Auth Visits: 10         Subjective: I can tell that the therapy is helping as my knees feel stronger overall.  I am going to be bill a lot of walking on an upcoming trip and I know that I will be able to do it .Stairs continues to bother me the most, mil going down  Pain: 5/10 in both knees today    Objective: Treatment per flow sheet.  Knee   Flexion: R 4+/5; L 4+/5  Extension: R 4/+5; L 4+/5      Eccentric lowering improved, but has tendency to IR as she continues to perform step down .    Assessment:Is continuing to improve mil with kinetic strength of bilateral LE's  She is going on a vacation out of town where she will be doing a lot of walking so anticipate that she will be able to walk with mininal pain .  She is expressing encouragement on her progress.     Goals:   Pt will improve knee extension ROM to 0 deg to allow proper heel strike during gait and terminal knee extension in stance PROGRESSING    Pt will improve knee AROM flexion to improve ability to perform bending and lifting activities such as cleaning, vacuuming  PROGRESSING    Pt will improve quad strength to 5/5 to ascend 1 flight of stairs reciprocally without UE assist PROGRESSING    Pt will increase hip and knee strength to grossly 4+/5 to be able to get up and down from the floor safely PROGRESSING    Pt will demonstrate increased hip ER/ABD strength to 4+/5 to perform stepping and squatting activities without excessive femoral IR/ADD PROGRESSING    Pt will improve SLS to >10 s to improve safety with gait on uneven  surfaces such as grass and gravel PROGRESSING    Pt will be independent and compliant with comprehensive HEP to maintain progress achieved in PT  PROGRESSING      Plan: Continue per plan of care , recommend continued PT with emphasis on strength, endurance, function mobility mil stairs .  Plan to see 1-3 more times   LEFS Score  LEFS Score: (Patient-Rptd) 53.75 % (11/8/2024 11:12 AM)    Post LEFS Score  Post LEFS Score: (Patient-Rptd) 53.75 % (3/14/2025 11:24 AM)    0 % improvement   Date: 1/7/2025   Tx#: 9  PN WRITTEN Date: 1/17/2025   Tx#: 10  1/10 1/27/2025  Tx 11  2/10 Date: 1/30/2025   Tx#: 12  3/10 Date: 2/3/2025   Tx#: 13     Date: 2/10/2025   Tx#: 14   Date: 2/28/2025   Tx#: 15   Date: 3/7/2025   Tx#: 16   Date: 3/14/2025   Tx#: 17 Date: 3/20/2025   Tx#:      NuStep L5x5' NuStep L5x5' NuStep L5x5' NuStep L5x5' NuStep L5x5' NuStep L5x5' NuStep L5x5'  seat 6 NuStep L5x5'  seat 6 NuStep L5x5'  seat 6 NuStep L5x5'  seat 6   TC side steps x10 red   BW x10 red TC   Shuttle x20 B 50# squats   Pilate's ring x20 hip ab/hip add   4\" fsu w/ RTB ER x15 each B     NMR:   Pilate's ring x20 hip ab/hip add each component   Bridge with pilate's ring x10          Step up>over>down 4\" x10 each cues for proper knee position and lateral pull with red TB   TKEs' red tb x2x10   BW x10 red TC  NMR:   Pilate's ring x20 hip ab/hip add each component   Bridge with pilate's ring x10     Step up>over>down 4\" x10 each cues for proper knee position and lateral pull with red TB  NMR:   Pilate's ring x20 hip ab/hip ,add each component   Bridge with pilate's ring x10   TKE's with ball  behind knee bilaterally 2x10    Heel raises x2x10 on airex    NMR:   Pilate's ring x20 hip ab/hip ,add each component   Bridge with pilate's ring x10   TKE's with ball  behind knee bilaterally 2x10  Step up>over>down 4\" x10 each cues for proper knee position and lateral pull with red TB   BW w/ gray TB  x10  Side steps blue TC x10   NMR:   Pilate's ring x20  hip ab/hip ,add each component   Bridge with pilate's ring x10   TKEs x2x10 red TB   Step up>over>down 4\" x10 each cues for proper knee position and lateral pull with red TB   BW w/ gray TB  x10  Side steps blue TC x10    NMR:   Step up>over>down 4\" x10 each cues for proper knee position    EZ slide hip extension x5    BW w/ gray TB  x10  Side steps blue TC x10    NMR:   Step up>over>down 4\" x10 each cues for proper knee position    Pilate's ring x20 hip ab/hip ,add each component   Bridge with pilate's ring x10        NMR:   Bosu lunges x10 each   Bosu balance bilateral LE with FT hold x5 @20 sec     Pilate's ring x20 hip ab/hip ,add each component   Bridge with pilate's ring x10  NMR:   Bosu lunges x10 each   Bosu balance bilateral LE with FT hold x5 @20 sec     Pilate's ring x20 hip ab/hip ,add each component   Bridge with pilate's ring x10    Manual: (20 min)  Patellar mobs all planes B  STM to quad B  Hip flexor/quad foam roll x2' each B Manual: (10 min)  Patellar mobs all planes B Manual: (10 min)  Patellar mobs all planes B Manual: (10 min)  Patellar mobs all planes B Manual: (10 min)  Patellar mobs all planes B Manual: (10 min)  Patellar mobs all planes B Manual: (10 min)  Patellar mobs all planes B Manual: (10 min)  Patellar mobs all planes B Manual: (10 min)  Patellar mobs all planes B Manual: (10 min)  Patellar mobs all planes B    TE:   Shuttle x10 31# squats   Shuttle x10 with ball for VMO   Shuttle x10 single leg 25# bilaterally TE:   Shuttle x10 31# squats   Shuttle x10 with ball for VMO   Shuttle x10 single leg 25# bilaterally TE:   Shuttle x10 31# squats   Shuttle x10 with ball for VMO   Shuttle x10 single leg 25# bilaterally TE:   Shuttle x10 31# squats   Shuttle x10 with ball for VMO   Shuttle x10 single leg 25# bilaterally TE:   Shuttle x10 31# squats   Shuttle x10 with ball for VMO   Shuttle x10 single leg 25# bilaterally TE:   Shuttle x10 31# squats   Shuttle x10 with ball for VMO   Shuttle x10  single leg 25# bilaterally TE:   Shuttle x10 31# squats   Shuttle x10 with ball for VMO   Shuttle x10 single leg 25# bilaterally TE:   Shuttle x20 50# squats   Shuttle x20 with ball for VMO   Shuttle x20 single leg 31# bilaterally TE:   Shuttle x20 50# squats   Shuttle x20 with ball for VMO   Shuttle x20 single leg 31# bilaterally   HEP: Access Code: [email or web address]  URL: [email or web address]  Date: 11/08/2024  Prepared by: Sol Alford    Exercises  - Clamshell  - 1 x daily - 7 x weekly - 2 sets - 10 reps  - Sidelying Hip Abduction  - 1 x daily - 7 x weekly - 2 sets - 10 repsds  - Supine Active Straight Leg Raise  - 1 x daily - 7 x weekly - 2 sets - 10 reps  - Straight Leg Raise with External Rotation  - 1 x daily - 7 x weekly - 2 sets - 10 reps      Charges: 1 MT1 TE 1 NMR       Total Timed Treatment: 45 min  Total Treatment Time: 45 min

## 2025-03-28 ENCOUNTER — APPOINTMENT (OUTPATIENT)
Dept: PHYSICAL THERAPY | Facility: HOSPITAL | Age: 70
End: 2025-03-28
Payer: MEDICARE

## 2025-03-28 ENCOUNTER — OFFICE VISIT (OUTPATIENT)
Dept: PHYSICAL THERAPY | Facility: HOSPITAL | Age: 70
End: 2025-03-28
Payer: MEDICARE

## 2025-03-28 PROCEDURE — 97110 THERAPEUTIC EXERCISES: CPT | Performed by: PHYSICAL THERAPIST

## 2025-03-28 PROCEDURE — 97112 NEUROMUSCULAR REEDUCATION: CPT | Performed by: PHYSICAL THERAPIST

## 2025-03-28 PROCEDURE — 97140 MANUAL THERAPY 1/> REGIONS: CPT | Performed by: PHYSICAL THERAPIST

## 2025-03-28 NOTE — PROGRESS NOTES
Progress Summary  Pt has attended 17 visits in Physical Therapy.        Diagnosis:   Acute pain of both knees (M25.561,M25.562)  Osteoarthritis of right knee, unspecified osteoarthritis type (M17.11)            Referring Provider: No ref. provider found  Date of Evaluation:    11/8/24    Precautions:  None Next MD visit:   none scheduled  Date of Surgery: n/a   Insurance Primary/Secondary: MEDICARE / [employer] [place]DEMN[place]     # Auth Visits: 10         Subjective: Feel overall stronger, but continue to note pain when she goes  down the stairs .  The first few steps going down are not too bad but then it starts to hurt so I end up going down sideways.    Pain: 5/10 in both knees today    Objective: Treatment per flow sheet.  Knee   Flexion: R 4+/5; L 4+/5  Extension: R 4/+5; L 4+/5      Observations:  IR of right >left hip noted with ecc lowering .  Tendency to walk with er of bilateral hips .     Assessment:Weakness in her LE Kinetic chain persists.  Tendency to roll into IR on right LE >left with completing step downs but responds well to cues to center knees over toes.     Goals:   Pt will improve knee extension ROM to 0 deg to allow proper heel strike during gait and terminal knee extension in stance PROGRESSING    Pt will improve knee AROM flexion to improve ability to perform bending and lifting activities such as cleaning, vacuuming  PROGRESSING    Pt will improve quad strength to 5/5 to ascend 1 flight of stairs reciprocally without UE assist PROGRESSING    Pt will increase hip and knee strength to grossly 4+/5 to be able to get up and down from the floor safely PROGRESSING    Pt will demonstrate increased hip ER/ABD strength to 4+/5 to perform stepping and squatting activities without excessive femoral IR/ADD PROGRESSING    Pt will improve SLS to >10 s to improve safety with gait on uneven surfaces such as grass and gravel PROGRESSING    Pt will be independent and compliant with comprehensive HEP to maintain  progress achieved in PT  PROGRESSING      Plan: Anticipate DC in the next visit , with emphasis on CKC, HEP   LEFS Score  LEFS Score: (Patient-Rptd) 53.75 % (11/8/2024 11:12 AM)    Post LEFS Score  Post LEFS Score: (Patient-Rptd) 53.75 % (3/14/2025 11:24 AM)    0 % improvement   Date: 1/7/2025   Tx#: 9  PN WRITTEN Date: 1/17/2025   Tx#: 10  1/10 1/27/2025  Tx 11  2/10 Date: 1/30/2025   Tx#: 12  3/10 Date: 2/3/2025   Tx#: 13     Date: 2/10/2025   Tx#: 14   Date: 2/28/2025   Tx#: 15   Date: 3/7/2025   Tx#: 16   Date: 3/14/2025   Tx#: 17 Date: 3/20/2025   Tx#: 18   Date: 3/28/2025   Tx#:      NuStep L5x5' NuStep L5x5' NuStep L5x5' NuStep L5x5' NuStep L5x5' NuStep L5x5' NuStep L5x5'  seat 6 NuStep L5x5'  seat 6 NuStep L5x5'  seat 6 NuStep L5x5'  seat 6 NuStep L5x5'  seat 6   TC side steps x10 red   BW x10 red TC   Shuttle x20 B 50# squats   Pilate's ring x20 hip ab/hip add   4\" fsu w/ RTB ER x15 each B     NMR:   Pilate's ring x20 hip ab/hip add each component   Bridge with pilate's ring x10          Step up>over>down 4\" x10 each cues for proper knee position and lateral pull with red TB   TKEs' red tb x2x10   BW x10 red TC  NMR:   Pilate's ring x20 hip ab/hip add each component   Bridge with pilate's ring x10     Step up>over>down 4\" x10 each cues for proper knee position and lateral pull with red TB  NMR:   Pilate's ring x20 hip ab/hip ,add each component   Bridge with pilate's ring x10   TKE's with ball  behind knee bilaterally 2x10    Heel raises x2x10 on airex    NMR:   Pilate's ring x20 hip ab/hip ,add each component   Bridge with pilate's ring x10   TKE's with ball  behind knee bilaterally 2x10  Step up>over>down 4\" x10 each cues for proper knee position and lateral pull with red TB   BW w/ gray TB  x10  Side steps blue TC x10   NMR:   Pilate's ring x20 hip ab/hip ,add each component   Bridge with pilate's ring x10   TKEs x2x10 red TB   Step up>over>down 4\" x10 each cues for proper knee position and lateral pull  with red TB   BW w/ gray TB  x10  Side steps blue TC x10    NMR:   Step up>over>down 4\" x10 each cues for proper knee position    EZ slide hip extension x5    BW w/ gray TB  x10  Side steps blue TC x10    NMR:   Step up>over>down 4\" x10 each cues for proper knee position    Pilate's ring x20 hip ab/hip ,add each component   Bridge with pilate's ring x10        NMR:   Bosu lunges x10 each   Bosu balance bilateral LE with FT hold x5 @20 sec     Pilate's ring x20 hip ab/hip ,add each component   Bridge with pilate's ring x10  NMR:   Bosu lunges x10 each   Bosu balance bilateral LE with FT hold x5 @20 sec     Pilate's ring x20 hip ab/hip ,add each component   Bridge with pilate's ring x10  NMR:   Pilate's ring x20 hip ab/hip ,add each component   Bridge with pilate's ring x10   Step downs with 4\" ecc control x10 bilaterally   TKEs grn TB x20   Bw red TC x10 with mini squat at end    Manual: (20 min)  Patellar mobs all planes B  STM to quad B  Hip flexor/quad foam roll x2' each B Manual: (10 min)  Patellar mobs all planes B Manual: (10 min)  Patellar mobs all planes B Manual: (10 min)  Patellar mobs all planes B Manual: (10 min)  Patellar mobs all planes B Manual: (10 min)  Patellar mobs all planes B Manual: (10 min)  Patellar mobs all planes B Manual: (10 min)  Patellar mobs all planes B Manual: (10 min)  Patellar mobs all planes B Manual: (10 min)  Patellar mobs all planes B Manual: (10 min)  Patellar mobs all planes B    TE:   Shuttle x10 31# squats   Shuttle x10 with ball for VMO   Shuttle x10 single leg 25# bilaterally TE:   Shuttle x10 31# squats   Shuttle x10 with ball for VMO   Shuttle x10 single leg 25# bilaterally TE:   Shuttle x10 31# squats   Shuttle x10 with ball for VMO   Shuttle x10 single leg 25# bilaterally TE:   Shuttle x10 31# squats   Shuttle x10 with ball for VMO   Shuttle x10 single leg 25# bilaterally TE:   Shuttle x10 31# squats   Shuttle x10 with ball for VMO   Shuttle x10 single leg 25#  bilaterally TE:   Shuttle x10 31# squats   Shuttle x10 with ball for VMO   Shuttle x10 single leg 25# bilaterally TE:   Shuttle x10 31# squats   Shuttle x10 with ball for VMO   Shuttle x10 single leg 25# bilaterally TE:   Shuttle x20 50# squats   Shuttle x20 with ball for VMO   Shuttle x20 single leg 31# bilaterally TE:   Shuttle x20 50# squats   Shuttle x20 with ball for VMO   Shuttle x20 single leg 31# bilaterally TE:   Shuttle x20 50# squats   Shuttle x20 with ball for VMO   Shuttle x20 single leg 31# bilaterally   HEP: Access Code: [email or web address]  URL: [email or web address]  Date: 11/08/2024  Prepared by: Sol Alford    Exercises  - Clamshell  - 1 x daily - 7 x weekly - 2 sets - 10 reps  - Sidelying Hip Abduction  - 1 x daily - 7 x weekly - 2 sets - 10 repsds  - Supine Active Straight Leg Raise  - 1 x daily - 7 x weekly - 2 sets - 10 reps  - Straight Leg Raise with External Rotation  - 1 x daily - 7 x weekly - 2 sets - 10 reps      Charges: 1 MT1 TE 1 NMR       Total Timed Treatment: 45 min  Total Treatment Time: 45 min

## 2025-04-07 ENCOUNTER — OFFICE VISIT (OUTPATIENT)
Dept: PHYSICAL THERAPY | Facility: HOSPITAL | Age: 70
End: 2025-04-07
Attending: INTERNAL MEDICINE
Payer: MEDICARE

## 2025-04-07 PROCEDURE — 97112 NEUROMUSCULAR REEDUCATION: CPT | Performed by: PHYSICAL THERAPIST

## 2025-04-07 PROCEDURE — 97110 THERAPEUTIC EXERCISES: CPT | Performed by: PHYSICAL THERAPIST

## 2025-04-07 PROCEDURE — 97140 MANUAL THERAPY 1/> REGIONS: CPT | Performed by: PHYSICAL THERAPIST

## 2025-04-21 NOTE — PROGRESS NOTES
Progress Summary  Pt has attended 17 visits in Physical Therapy.        Diagnosis:   Acute pain of both knees (M25.561,M25.562)  Osteoarthritis of right knee, unspecified osteoarthritis type (M17.11)            Referring Provider: No ref. provider found  Date of Evaluation:    11/8/24    Precautions:  None Next MD visit:   none scheduled  Date of Surgery: n/a   Insurance Primary/Secondary: MEDICARE / [employer] [place]DEMN[place]     # Auth Visits: 10         Subjective: Overall am feeling very good, knees are still painful mil with stairs, but have been feeling overall good. Can tell that my knees feel stronger and going up the stairs isnt as hard.  Down is still the hardest.   Pain: 4/10 in both knees today    Objective: Treatment per flow sheet.  Knee    Flexion: R 5-/5; L 5/5  Extension: R 5-/5; L 5-/5       Ecc control 4/5 with step downs  Flexion: R wnl; L wnl  Extension: R wnl; L wnl   Observations:  IR of right >left hip noted with ecc lowering .  Tendency to walk with er of bilateral hips .     Balance: SLS: R 12 sec, L 12 sec  with hand hold        Assessment:Has progressed very well with overall general strength, endurance, and functional strength . Descending stairs continues to be the most subjectively difficult, but she is alos able to recognize her limitations and will go step to step with descending stairs.  She has made good gains with overall improvement of strength and endurance  and is independent with her walking, ADL's     Goals:   Pt will improve knee extension ROM to 0 deg to allow proper heel strike during gait and terminal knee extension in stance MET     Pt will improve knee AROM flexion to improve ability to perform bending and lifting activities such as cleaning, vacuuming  MET    Pt will improve quad strength to 5/5 to ascend 1 flight of stairs reciprocally without UE assist MET   Pt will increase hip and knee strength to grossly 4+/5 to be able to get up and down from the floor safely MET      Pt will demonstrate increased hip ER/ABD strength to 4+/5 to perform stepping and squatting activities without excessive femoral IR/ADD MET   Pt will improve SLS to >10 s to improve safety with gait on uneven surfaces such as grass and gravel MET   Pt will be independent and compliant with comprehensive HEP to maintain progress achieved in PT  MET     Plan: Anticipate DC in the next visit , with emphasis on CKC, HEP   LEFS Score  LEFS Score: (Patient-Rptd) 53.75 % (11/8/2024 11:12 AM)    Post LEFS Score  Post LEFS Score: (Patient-Rptd) 57.5 % (4/7/2025  2:52 PM)    3.75 % improvement   Date: 1/7/2025   Tx#: 9  PN WRITTEN Date: 1/17/2025   Tx#: 10  1/10 1/27/2025  Tx 11  2/10 Date: 1/30/2025   Tx#: 12  3/10 Date: 2/3/2025   Tx#: 13     Date: 2/10/2025   Tx#: 14   Date: 2/28/2025   Tx#: 15   Date: 3/7/2025   Tx#: 16   Date: 3/14/2025   Tx#: 17 Date: 3/20/2025   Tx#: 18   Date: 3/28/2025   Tx#: 19   Date: 4/7/2025   Tx#: 20   NuStep L5x5' NuStep L5x5' NuStep L5x5' NuStep L5x5' NuStep L5x5' NuStep L5x5' NuStep L5x5'  seat 6 NuStep L5x5'  seat 6 NuStep L5x5'  seat 6 NuStep L5x5'  seat 6 NuStep L5x5'  seat 6 NuStep L5x5'  seat 6   TC side steps x10 red   BW x10 red TC   Shuttle x20 B 50# squats   Pilate's ring x20 hip ab/hip add   4\" fsu w/ RTB ER x15 each B     NMR:   Pilate's ring x20 hip ab/hip add each component   Bridge with pilate's ring x10          Step up>over>down 4\" x10 each cues for proper knee position and lateral pull with red TB   TKEs' red tb x2x10   BW x10 red TC  NMR:   Pilate's ring x20 hip ab/hip add each component   Bridge with pilate's ring x10     Step up>over>down 4\" x10 each cues for proper knee position and lateral pull with red TB  NMR:   Pilate's ring x20 hip ab/hip ,add each component   Bridge with pilate's ring x10   TKE's with ball  behind knee bilaterally 2x10    Heel raises x2x10 on airex    NMR:   Pilate's ring x20 hip ab/hip ,add each component   Bridge with pilate's ring x10   TKE's  with ball  behind knee bilaterally 2x10  Step up>over>down 4\" x10 each cues for proper knee position and lateral pull with red TB   BW w/ gray TB  x10  Side steps blue TC x10   NMR:   Pilate's ring x20 hip ab/hip ,add each component   Bridge with pilate's ring x10   TKEs x2x10 red TB   Step up>over>down 4\" x10 each cues for proper knee position and lateral pull with red TB   BW w/ gray TB  x10  Side steps blue TC x10    NMR:   Step up>over>down 4\" x10 each cues for proper knee position    EZ slide hip extension x5    BW w/ gray TB  x10  Side steps blue TC x10    NMR:   Step up>over>down 4\" x10 each cues for proper knee position    Pilate's ring x20 hip ab/hip ,add each component   Bridge with pilate's ring x10        NMR:   Bosu lunges x10 each   Bosu balance bilateral LE with FT hold x5 @20 sec     Pilate's ring x20 hip ab/hip ,add each component   Bridge with pilate's ring x10  NMR:   Bosu lunges x10 each   Bosu balance bilateral LE with FT hold x5 @20 sec     Pilate's ring x20 hip ab/hip ,add each component   Bridge with pilate's ring x10  NMR:   Pilate's ring x20 hip ab/hip ,add each component   Bridge with pilate's ring x10   Step downs with 4\" ecc control x10 bilaterally   TKEs grn TB x20   Bw red TC x10 with mini squat at end  NMR:   Pilate's ring x20 hip ab/hip ,add each component   Bridge with pilate's ring x10   Step downs with 4\" ecc control x10 bilaterally   TKEs grn TB x20   Bw red TC x10 with mini squat at end    Manual: (20 min)  Patellar mobs all planes B  STM to quad B  Hip flexor/quad foam roll x2' each B Manual: (10 min)  Patellar mobs all planes B Manual: (10 min)  Patellar mobs all planes B Manual: (10 min)  Patellar mobs all planes B Manual: (10 min)  Patellar mobs all planes B Manual: (10 min)  Patellar mobs all planes B Manual: (10 min)  Patellar mobs all planes B Manual: (10 min)  Patellar mobs all planes B Manual: (10 min)  Patellar mobs all planes B Manual: (10 min)  Patellar mobs all  planes B Manual: (10 min)  Patellar mobs all planes B Manual: (10 min)  Patellar mobs all planes B    TE:   Shuttle x10 31# squats   Shuttle x10 with ball for VMO   Shuttle x10 single leg 25# bilaterally TE:   Shuttle x10 31# squats   Shuttle x10 with ball for VMO   Shuttle x10 single leg 25# bilaterally TE:   Shuttle x10 31# squats   Shuttle x10 with ball for VMO   Shuttle x10 single leg 25# bilaterally TE:   Shuttle x10 31# squats   Shuttle x10 with ball for VMO   Shuttle x10 single leg 25# bilaterally TE:   Shuttle x10 31# squats   Shuttle x10 with ball for VMO   Shuttle x10 single leg 25# bilaterally TE:   Shuttle x10 31# squats   Shuttle x10 with ball for VMO   Shuttle x10 single leg 25# bilaterally TE:   Shuttle x10 31# squats   Shuttle x10 with ball for VMO   Shuttle x10 single leg 25# bilaterally TE:   Shuttle x20 50# squats   Shuttle x20 with ball for VMO   Shuttle x20 single leg 31# bilaterally TE:   Shuttle x20 50# squats   Shuttle x20 with ball for VMO   Shuttle x20 single leg 31# bilaterally TE:   Shuttle x20 50# squats   Shuttle x20 with ball for VMO   Shuttle x20 single leg 31# bilaterally TE:   Shuttle x20 50# squats   Shuttle x20 with ball for VMO   Shuttle x20 single leg 31# bilaterally   HEP: Access Code: [email or web address]  URL: [email or web address]  Date: 11/08/2024  Prepared by: Sol Alford    Exercises  - Clamshell  - 1 x daily - 7 x weekly - 2 sets - 10 reps  - Sidelying Hip Abduction  - 1 x daily - 7 x weekly - 2 sets - 10 repsds  - Supine Active Straight Leg Raise  - 1 x daily - 7 x weekly - 2 sets - 10 reps  - Straight Leg Raise with External Rotation  - 1 x daily - 7 x weekly - 2 sets - 10 reps      Charges: 1 MT1 TE 1 NMR       Total Timed Treatment: 45 min  Total Treatment Time: 45 min

## 2025-05-16 ENCOUNTER — OFFICE VISIT (OUTPATIENT)
Dept: INTERNAL MEDICINE CLINIC | Facility: CLINIC | Age: 70
End: 2025-05-16
Payer: MEDICARE

## 2025-05-16 ENCOUNTER — NURSE TRIAGE (OUTPATIENT)
Dept: INTERNAL MEDICINE CLINIC | Facility: CLINIC | Age: 70
End: 2025-05-16

## 2025-05-16 VITALS
HEART RATE: 70 BPM | DIASTOLIC BLOOD PRESSURE: 66 MMHG | SYSTOLIC BLOOD PRESSURE: 122 MMHG | TEMPERATURE: 98 F | WEIGHT: 122.38 LBS | HEIGHT: 65 IN | RESPIRATION RATE: 18 BRPM | OXYGEN SATURATION: 98 % | BODY MASS INDEX: 20.39 KG/M2

## 2025-05-16 DIAGNOSIS — M54.30 SCIATIC NERVE PAIN, UNSPECIFIED LATERALITY: ICD-10-CM

## 2025-05-16 DIAGNOSIS — M79.18 PIRIFORMIS MUSCLE PAIN: Primary | ICD-10-CM

## 2025-05-16 PROCEDURE — 99214 OFFICE O/P EST MOD 30 MIN: CPT

## 2025-05-16 RX ORDER — METHYLPREDNISOLONE 4 MG/1
TABLET ORAL
Qty: 21 EACH | Refills: 0 | Status: SHIPPED | OUTPATIENT
Start: 2025-05-16

## 2025-05-16 NOTE — TELEPHONE ENCOUNTER
Action Requested: Summary for Provider     []  Critical Lab, Recommendations Needed  [] Need Additional Advice  []   FYI    []   Need Orders  [] Need Medications Sent to Pharmacy  []  Other     SUMMARY: Patient reports bilateral sciatica pain and is requesting steroids. Advised patient she needs appointment for any prescriptions.  Patient reports a history of sciatica pain. This episode started about a week ago. Radiates from lower back to feet on both legs. Worse when sitting, or standing to sit. Unbearable today. Denies any UTI symptoms, vomiting, weakness. She is able to walk and this is easier than sitting. Appointment scheduled this afternoon. Patient confirms understanding.     Reason for call: Sciatica  Onset: 1 week       Reason for Disposition   Pain radiates into the thigh or further down the leg, and in both legs    Protocols used: Back Pain-A-[place]
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
## 2025-05-19 ENCOUNTER — TELEPHONE (OUTPATIENT)
Dept: INTERNAL MEDICINE CLINIC | Facility: CLINIC | Age: 70
End: 2025-05-19

## 2025-05-19 NOTE — TELEPHONE ENCOUNTER
MELISSA Siddiqui     LOV 5/16/25    Patient called. Stated she was advised by Dona Siddiqui to follow up with condition update today. Patient stated she is feeling so much better today. Patient wanted me to express her gratitude for Dona Siddiqui for seeing her. She is moving better. Seeing Dr. Reynolds Thursday. Still taking Medrol as prescribed. Patient again stating she is grateful for Dona Siddiqui.

## 2025-05-22 ENCOUNTER — TELEPHONE (OUTPATIENT)
Dept: INTERNAL MEDICINE CLINIC | Facility: CLINIC | Age: 70
End: 2025-05-22

## 2025-05-22 ENCOUNTER — OFFICE VISIT (OUTPATIENT)
Dept: INTERNAL MEDICINE CLINIC | Facility: CLINIC | Age: 70
End: 2025-05-22
Payer: MEDICARE

## 2025-05-22 VITALS
TEMPERATURE: 97 F | BODY MASS INDEX: 19.66 KG/M2 | OXYGEN SATURATION: 98 % | WEIGHT: 118 LBS | HEART RATE: 66 BPM | RESPIRATION RATE: 14 BRPM | HEIGHT: 65 IN | DIASTOLIC BLOOD PRESSURE: 72 MMHG | SYSTOLIC BLOOD PRESSURE: 116 MMHG

## 2025-05-22 DIAGNOSIS — D25.9 UTERINE LEIOMYOMA, UNSPECIFIED LOCATION: ICD-10-CM

## 2025-05-22 DIAGNOSIS — K76.89 HEPATIC CYST: ICD-10-CM

## 2025-05-22 DIAGNOSIS — M35.00 SJOGREN SYNDROME, UNSPECIFIED (HCC): ICD-10-CM

## 2025-05-22 DIAGNOSIS — M85.89 OSTEOPENIA OF MULTIPLE SITES: ICD-10-CM

## 2025-05-22 DIAGNOSIS — E03.8 SUBCLINICAL HYPOTHYROIDISM: ICD-10-CM

## 2025-05-22 DIAGNOSIS — E03.8 SUBCLINICAL HYPOTHYROIDISM: Primary | ICD-10-CM

## 2025-05-22 DIAGNOSIS — M54.16 BILATERAL LUMBAR RADICULOPATHY: Primary | ICD-10-CM

## 2025-05-22 DIAGNOSIS — C49.A2 GASTROINTESTINAL STROMAL TUMOR (GIST) OF STOMACH (HCC): ICD-10-CM

## 2025-05-22 DIAGNOSIS — C16.9 MALIGNANT NEOPLASM OF STOMACH, UNSPECIFIED LOCATION (HCC): ICD-10-CM

## 2025-05-22 PROCEDURE — 99214 OFFICE O/P EST MOD 30 MIN: CPT | Performed by: INTERNAL MEDICINE

## 2025-05-22 PROCEDURE — G2211 COMPLEX E/M VISIT ADD ON: HCPCS | Performed by: INTERNAL MEDICINE

## 2025-05-22 RX ORDER — METHYLPREDNISOLONE 4 MG/1
TABLET ORAL
Qty: 1 EACH | Refills: 0 | Status: SHIPPED | OUTPATIENT
Start: 2025-05-22
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
## 2025-05-22 NOTE — TELEPHONE ENCOUNTER
Patient called request labs prior to their annual physical.  Annual physical scheduled for 11/10/25.   Please order labs. Patient preferred lab is [facility]  Patient informed request was sent to clinical team.  Patient informed to fast for labs.  No callback required.   Future Appointments   11/10/2025 11:20 AM Orion Reynolds MD [id number]

## 2025-05-23 ENCOUNTER — TELEPHONE (OUTPATIENT)
Dept: INTERNAL MEDICINE CLINIC | Facility: CLINIC | Age: 70
End: 2025-05-23

## 2025-05-23 NOTE — TELEPHONE ENCOUNTER
Patient asking if she is due for A1C  Last A1C completed 8/22/24  Component  Ref Range & Units (hover) 8/22/24 10:32 AM   HgbA1C 5.4       LOV 5/22/25    Please advise regarding lab order for A1C

## 2025-05-23 NOTE — TELEPHONE ENCOUNTER
With her most recent fasting blood glucose level of 95 in December, and without current prediabetes or diabetes, this will not likely be covered by her insurance.  We can pursue a fingerstick hemoglobin A1c when she comes to the office for her next appointment on August 27.

## 2025-05-23 NOTE — TELEPHONE ENCOUNTER
Spoke to patient regarding  recommendation, patient verbalizes understanding and will wait until August appt for POC A1C.   nonweight-bearing

## 2025-06-05 ENCOUNTER — HOSPITAL ENCOUNTER (OUTPATIENT)
Dept: CT IMAGING | Facility: HOSPITAL | Age: 70
Discharge: HOME | End: 2025-06-05
Attending: INTERNAL MEDICINE
Payer: MEDICARE

## 2025-06-05 DIAGNOSIS — C49.A2 GASTROINTESTINAL STROMAL TUMOR (GIST) OF STOMACH (HCC): ICD-10-CM

## 2025-06-05 LAB
CREAT BLD-MCNC: 0.5 MG/DL (ref 0.55–1.02)
EGFRCR SERPLBLD CKD-EPI 2021: 101 ML/MIN/1.73M2 (ref 60–?)

## 2025-06-05 PROCEDURE — 74177 CT ABD & PELVIS W/CONTRAST: CPT | Performed by: INTERNAL MEDICINE

## 2025-06-05 PROCEDURE — 71260 CT THORAX DX C+: CPT | Performed by: INTERNAL MEDICINE

## 2025-06-05 PROCEDURE — 82565 ASSAY OF CREATININE: CPT

## 2025-06-06 ENCOUNTER — OFFICE VISIT (OUTPATIENT)
Dept: PODIATRY CLINIC | Facility: CLINIC | Age: 70
End: 2025-06-06

## 2025-06-06 DIAGNOSIS — M21.619 BUNION: ICD-10-CM

## 2025-06-06 DIAGNOSIS — E11.9 TYPE 2 DIABETES MELLITUS WITHOUT COMPLICATION, UNSPECIFIED WHETHER LONG TERM INSULIN USE (HCC): ICD-10-CM

## 2025-06-06 DIAGNOSIS — B35.1 ONYCHOMYCOSIS: Primary | ICD-10-CM

## 2025-06-06 DIAGNOSIS — L60.3 NAIL DYSTROPHY: ICD-10-CM

## 2025-06-06 DIAGNOSIS — S99.922A INJURY OF TOENAIL OF LEFT FOOT, INITIAL ENCOUNTER: ICD-10-CM

## 2025-06-06 DIAGNOSIS — M20.41 HAMMER TOE OF RIGHT FOOT: ICD-10-CM

## 2025-06-06 DIAGNOSIS — L84 FOOT CALLUS: ICD-10-CM

## 2025-06-06 PROCEDURE — 99213 OFFICE O/P EST LOW 20 MIN: CPT | Performed by: STUDENT IN AN ORGANIZED HEALTH CARE EDUCATION/TRAINING PROGRAM
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
## 2025-06-11 ENCOUNTER — OFFICE VISIT (OUTPATIENT)
Age: 70
End: 2025-06-11
Attending: INTERNAL MEDICINE
Payer: MEDICARE

## 2025-06-11 VITALS
SYSTOLIC BLOOD PRESSURE: 124 MMHG | WEIGHT: 120 LBS | OXYGEN SATURATION: 96 % | RESPIRATION RATE: 16 BRPM | DIASTOLIC BLOOD PRESSURE: 66 MMHG | TEMPERATURE: 98 F | BODY MASS INDEX: 19.75 KG/M2 | HEIGHT: 65.35 IN | HEART RATE: 74 BPM

## 2025-06-11 DIAGNOSIS — C49.A2 GASTROINTESTINAL STROMAL TUMOR (GIST) OF STOMACH (HCC): Primary | ICD-10-CM

## 2025-06-11 LAB
ALBUMIN SERPL-MCNC: 4.5 G/DL (ref 3.2–4.8)
ALBUMIN/GLOB SERPL: 1.8 {RATIO} (ref 1–2)
ALP LIVER SERPL-CCNC: 78 U/L (ref 55–142)
ALT SERPL-CCNC: 24 U/L (ref 10–49)
ANION GAP SERPL CALC-SCNC: 10 MMOL/L (ref 0–18)
AST SERPL-CCNC: 27 U/L (ref ?–34)
BASOPHILS # BLD AUTO: 0.02 X10(3) UL (ref 0–0.2)
BASOPHILS NFR BLD AUTO: 0.5 %
BILIRUB SERPL-MCNC: 0.7 MG/DL (ref 0.2–1.1)
BUN BLD-MCNC: 17 MG/DL (ref 9–23)
CALCIUM BLD-MCNC: 9.5 MG/DL (ref 8.7–10.6)
CHLORIDE SERPL-SCNC: 103 MMOL/L (ref 98–112)
CO2 SERPL-SCNC: 27 MMOL/L (ref 21–32)
CREAT BLD-MCNC: 0.74 MG/DL (ref 0.55–1.02)
DEPRECATED HBV CORE AB SER IA-ACNC: 66 NG/ML (ref 50–306)
EGFRCR SERPLBLD CKD-EPI 2021: 87 ML/MIN/1.73M2 (ref 60–?)
EOSINOPHIL # BLD AUTO: 0.03 X10(3) UL (ref 0–0.7)
EOSINOPHIL NFR BLD AUTO: 0.7 %
ERYTHROCYTE [DISTWIDTH] IN BLOOD BY AUTOMATED COUNT: 13.2 %
FASTING STATUS PATIENT QL REPORTED: NO
FOLATE SERPL-MCNC: 37.9 NG/ML (ref 5.4–?)
GLOBULIN PLAS-MCNC: 2.5 G/DL (ref 2–3.5)
GLUCOSE BLD-MCNC: 110 MG/DL (ref 70–99)
HCT VFR BLD AUTO: 38.4 % (ref 35–48)
HGB BLD-MCNC: 13.3 G/DL (ref 12–16)
IMM GRANULOCYTES # BLD AUTO: 0.02 X10(3) UL (ref 0–1)
IMM GRANULOCYTES NFR BLD: 0.5 %
IRON SATN MFR SERPL: 32 % (ref 15–50)
IRON SERPL-MCNC: 93 UG/DL (ref 50–170)
LYMPHOCYTES # BLD AUTO: 1.21 X10(3) UL (ref 1–4)
LYMPHOCYTES NFR BLD AUTO: 29.2 %
MCH RBC QN AUTO: 30.4 PG (ref 26–34)
MCHC RBC AUTO-ENTMCNC: 34.6 G/DL (ref 31–37)
MCV RBC AUTO: 87.7 FL (ref 80–100)
MONOCYTES # BLD AUTO: 0.33 X10(3) UL (ref 0.1–1)
MONOCYTES NFR BLD AUTO: 8 %
NEUTROPHILS # BLD AUTO: 2.54 X10 (3) UL (ref 1.5–7.7)
NEUTROPHILS # BLD AUTO: 2.54 X10(3) UL (ref 1.5–7.7)
NEUTROPHILS NFR BLD AUTO: 61.1 %
OSMOLALITY SERPL CALC.SUM OF ELEC: 292 MOSM/KG (ref 275–295)
PLATELET # BLD AUTO: 160 10(3)UL (ref 150–450)
POTASSIUM SERPL-SCNC: 4.3 MMOL/L (ref 3.5–5.1)
PROT SERPL-MCNC: 7 G/DL (ref 5.7–8.2)
RBC # BLD AUTO: 4.38 X10(6)UL (ref 3.8–5.3)
SODIUM SERPL-SCNC: 140 MMOL/L (ref 136–145)
TOTAL IRON BINDING CAPACITY: 289 UG/DL (ref 250–425)
TRANSFERRIN SERPL-MCNC: 214 MG/DL (ref 250–380)
VIT B12 SERPL-MCNC: 840 PG/ML (ref 211–911)
WBC # BLD AUTO: 4.2 X10(3) UL (ref 4–11)

## 2025-06-11 NOTE — PROGRESS NOTES
Education Record    Learner:  Patient and Family Member    Disease / Diagnosis: GIST     Barriers / Limitations:  None   Comments:    Method:  Discussion   Comments:    General Topics:  Pain and Plan of care reviewed   Comments:    Outcome:  Shows understanding   Comments:    Here for follow up after CT scan. She is feeling well overall. Some generalized achiness. Feels her energy could be better. Still does all activities at home. Bowels are moving fine currently, although she gets constipated easily if she does not drink enough water. She fixes this with increased water consumption and vegetables. Her appetite is good.

## 2025-06-12 ENCOUNTER — TELEPHONE (OUTPATIENT)
Age: 70
End: 2025-06-12

## 2025-06-12 NOTE — TELEPHONE ENCOUNTER
Pt called stated when she saw Dr. Poole yesterday she was advised she doesn't have to take a solution for her CT scan. However when she called to schedule she was advised by scheduling the order indicates she has to take it. Pt would like to know if she should or shouldn't take the solution    Please call back

## 2025-07-07 ENCOUNTER — TELEPHONE (OUTPATIENT)
Dept: ORTHOPEDICS CLINIC | Facility: CLINIC | Age: 70
End: 2025-07-07

## 2025-07-07 NOTE — TELEPHONE ENCOUNTER
Patient called and stated that she had a test done about a month ago and is calling to get the results. Please advise.

## 2025-07-07 NOTE — TELEPHONE ENCOUNTER
Patient had dermatophyte taken on 6/6- Currently states no growth at 3 weeks. Please advise if you would like patient to do any treatment at this time or if you would prefer to get final culture result back for patient

## 2025-07-11 NOTE — TELEPHONE ENCOUNTER
Called patient and verified name and . Informed her of results and treatment recommendations. She did state she has been using Kerasal for a few months already with no improvement. I did advise it can take several months or longer to see improvement in the nails. Recommended scheduling 2 month follow up appointment for reevaluation. Appointment scheduled  at 1030am with Dr Jeong. She had no further concerns.

## 2025-07-11 NOTE — TELEPHONE ENCOUNTER
Thanks for noticing this, I would recommend Naveed at this time as there is still no fungus at 4 weeks.

## 2025-08-27 ENCOUNTER — OFFICE VISIT (OUTPATIENT)
Dept: INTERNAL MEDICINE CLINIC | Facility: CLINIC | Age: 70
End: 2025-08-27

## 2025-08-27 VITALS
OXYGEN SATURATION: 98 % | HEIGHT: 65 IN | DIASTOLIC BLOOD PRESSURE: 68 MMHG | RESPIRATION RATE: 16 BRPM | WEIGHT: 121 LBS | BODY MASS INDEX: 20.16 KG/M2 | TEMPERATURE: 99 F | HEART RATE: 81 BPM | SYSTOLIC BLOOD PRESSURE: 118 MMHG

## 2025-08-27 DIAGNOSIS — R31.29 MICROSCOPIC HEMATURIA: ICD-10-CM

## 2025-08-27 DIAGNOSIS — E03.8 SUBCLINICAL HYPOTHYROIDISM: ICD-10-CM

## 2025-08-27 DIAGNOSIS — K59.00 CONSTIPATION, UNSPECIFIED CONSTIPATION TYPE: ICD-10-CM

## 2025-08-27 DIAGNOSIS — R35.0 URINARY FREQUENCY: Primary | ICD-10-CM

## 2025-08-27 DIAGNOSIS — R73.03 PREDIABETES: ICD-10-CM

## 2025-08-27 LAB
BILIRUBIN: NEGATIVE
GLUCOSE (URINE DIPSTICK): NEGATIVE MG/DL
KETONES (URINE DIPSTICK): NEGATIVE MG/DL
LEUKOCYTES: NEGATIVE
MULTISTIX LOT#: ABNORMAL NUMERIC
NITRITE, URINE: NEGATIVE
PH, URINE: 7 (ref 4.5–8)
PROTEIN (URINE DIPSTICK): NEGATIVE MG/DL
SPECIFIC GRAVITY: 1.01 (ref 1–1.03)
URINE-COLOR: YELLOW
UROBILINOGEN,SEMI-QN: 0.2 MG/DL (ref 0–1.9)

## 2025-08-27 PROCEDURE — 87086 URINE CULTURE/COLONY COUNT: CPT | Performed by: INTERNAL MEDICINE

## 2025-08-27 PROCEDURE — 99214 OFFICE O/P EST MOD 30 MIN: CPT | Performed by: INTERNAL MEDICINE

## 2025-08-27 PROCEDURE — 81003 URINALYSIS AUTO W/O SCOPE: CPT | Performed by: INTERNAL MEDICINE

## 2025-08-27 PROCEDURE — G2211 COMPLEX E/M VISIT ADD ON: HCPCS | Performed by: INTERNAL MEDICINE

## (undated) DEVICE — FOAM ARMBOARD POSITION 3X5X24

## (undated) DEVICE — ENDOCUT SCISSOR TIP, DISPOSABLE: Brand: RENEW

## (undated) DEVICE — TROCAR: Brand: KII® SLEEVE

## (undated) DEVICE — ENDOPATH ULTRA VERESS INSUFFLATION NEEDLES WITH LUER LOCK CONNECTORS: Brand: ENDOPATH

## (undated) DEVICE — UNDYED BRAIDED (POLYGLACTIN 910), SYNTHETIC ABSORBABLE SUTURE: Brand: COATED VICRYL

## (undated) DEVICE — LIGHT HANDLE

## (undated) DEVICE — HIPEC PACK: Brand: MEDLINE INDUSTRIES, INC.

## (undated) DEVICE — TIP COVER ACCESSORY

## (undated) DEVICE — ANCHOR TISSUE RETRIEVAL SYSTEM, BAG SIZE 175 ML, PORT SIZE 10 MM: Brand: ANCHOR TISSUE RETRIEVAL SYSTEM

## (undated) DEVICE — SUT PROLENE 4-0 RB-1 8557H

## (undated) DEVICE — GOWN,SIRUS,FABRIC-REINFORCED,X-LARGE: Brand: MEDLINE

## (undated) DEVICE — REDUCER: Brand: ENDOWRIST

## (undated) DEVICE — WOUND RETRACTOR AND PROTECTOR: Brand: ALEXIS O WOUND PROTECTOR-RETRACTOR

## (undated) DEVICE — 40580 - THE PINK PAD - ADVANCED TRENDELENBURG POSITIONING KIT: Brand: 40580 - THE PINK PAD - ADVANCED TRENDELENBURG POSITIONING KIT

## (undated) DEVICE — TAPE UMBILICAL 30X1/16IN

## (undated) DEVICE — SUT VICRYL 0 CT-1 J946H

## (undated) DEVICE — COLUMN DRAPE

## (undated) DEVICE — 2, DISPOSABLE SUCTION/IRRIGATOR WITHOUT DISPOSABLE TIP: Brand: STRYKEFLOW

## (undated) DEVICE — ENDOSCOPY PACK UPPER: Brand: MEDLINE INDUSTRIES, INC.

## (undated) DEVICE — STAPLER 60: Brand: SUREFORM

## (undated) DEVICE — STAPLER 60 RELOAD GREEN: Brand: SUREFORM

## (undated) DEVICE — 1200CC GUARDIAN II: Brand: GUARDIAN

## (undated) DEVICE — ELECTRODE EDGE PENCIL 10FT

## (undated) DEVICE — [HIGH FLOW INSUFFLATOR,  DO NOT USE IF PACKAGE IS DAMAGED,  KEEP DRY,  KEEP AWAY FROM SUNLIGHT,  PROTECT FROM HEAT AND RADIOACTIVE SOURCES.]: Brand: PNEUMOSURE

## (undated) DEVICE — BLADELESS OBTURATOR: Brand: WECK VISTA

## (undated) DEVICE — SHEET,DRAPE,40X58,STERILE: Brand: MEDLINE

## (undated) DEVICE — SUT ETHILON 3-0 PS-2 1669H

## (undated) DEVICE — 3M™ TEGADERM™ +PAD FILM DRESSING WITH NON-ADHERENT PAD, 3587, 3-1/2 IN X 4-1/8 IN (9 CM X 10.5 CM), 25/CAR, 4 CAR/CS: Brand: 3M™ TEGADERM™

## (undated) DEVICE — AIRSEAL 5 MM ACCESS PORT AND LOW PROFILE OBTURATOR WITH BLADELESS OPTICAL TIP, 120 MM LENGTH: Brand: AIRSEAL

## (undated) DEVICE — 3M™ RED DOT™ MONITORING ELECTRODE WITH FOAM TAPE AND STICKY GEL, 50/BAG, 20/CASE, 72/PLT 2570: Brand: RED DOT™

## (undated) DEVICE — PAD SACRAL PREMIUM 12X12X1

## (undated) DEVICE — HUNTER GASPER TIP, DISPOSABLE: Brand: RENEW

## (undated) DEVICE — Device: Brand: DEFENDO AIR/WATER/SUCTION AND BIOPSY VALVE

## (undated) DEVICE — VESSEL SEALER EXTEND: Brand: ENDOWRIST

## (undated) DEVICE — #11 STERILE BLADE: Brand: POLYMER COATED BLADES

## (undated) DEVICE — ADHESIVE MASTISOL 2/3ML

## (undated) DEVICE — STERILE SYNTHETIC POLYISOPRENE POWDER-FREE SURGICAL GLOVES WITH HYDROGEL COATING: Brand: PROTEXIS

## (undated) DEVICE — ARM 4 PROXIMAL DRAPE

## (undated) DEVICE — EVACUATOR RELIAVAC 100CC

## (undated) DEVICE — SUT VICRYL 3-0 SH J416H

## (undated) DEVICE — TRI-LUMEN FILTERED TUBE SET WITH ACTIVATED CHARCOAL FILTER: Brand: AIRSEAL

## (undated) DEVICE — COVER,MAYO STAND,STERILE: Brand: MEDLINE

## (undated) DEVICE — DRAPE,TOP,102X53,STERILE: Brand: MEDLINE

## (undated) DEVICE — ARM DRAPE

## (undated) DEVICE — LAPAROVUE VISIBILITY SYSTEM LAPAROSCOPIC SOLUTIONS: Brand: LAPAROVUE

## (undated) DEVICE — TRAY SURESTEP 16 BARDEX UMETR

## (undated) DEVICE — SEAL

## (undated) DEVICE — BLADELESS OBTURATOR

## (undated) DEVICE — SUT SILK 2-0 SH K833H

## (undated) DEVICE — TUBING MEGADYNE SPECULUM

## (undated) DEVICE — LONG FENESTRATED GRASPER TIP, DISPOSABLE: Brand: RENEW

## (undated) DEVICE — NEEDLE BIOPSY ACQUIRE OD22 GA

## (undated) DEVICE — SLEEVE KENDALL SCD EXPRESS MED

## (undated) DEVICE — STERILE SYNTHETIC POLYISOPRENE POWDER-FREE SURGICAL GLOVES WITH HYDROGEL COATING, SMOOTH FINISH, STRAIGHT FINGER: Brand: PROTEXIS

## (undated) DEVICE — ENDOSCOPY PACK - LOWER: Brand: MEDLINE INDUSTRIES, INC.

## (undated) DEVICE — SUT SILK 3-0 SH K832H

## (undated) DEVICE — SOL NACL IRRIG 0.9% 1000ML BTL

## (undated) DEVICE — #11 STERILE SAFETY SCALPEL: Brand: DISPOSABLE SAFETY SCALPEL

## (undated) DEVICE — DISPOSABLE GRASPER: Brand: EPIX LAPAROSCOPIC GRASPER

## (undated) DEVICE — CANNULA SEAL

## (undated) DEVICE — FILTERLINE NASAL ADULT O2/CO2

## (undated) DEVICE — MEGADYNE ELECTRODE ADULT PT RT

## (undated) DEVICE — TROCAR: Brand: KII FIOS FIRST ENTRY

## (undated) DEVICE — STERILE POLYISOPRENE POWDER-FREE SURGICAL GLOVES: Brand: PROTEXIS

## (undated) DEVICE — SOL  0.9% 1000ML

## (undated) NOTE — MR AVS SNAPSHOT
After Visit Summary   5/11/2023    Barry Mcintosh   MRN: [id number]           Visit Information     Date & Time  5/11/2023  3:35 PM Provider  [address] [place]. Phone  [phone]      Allergies as of 5/11/2023  Review status set to Review Complete on 5/11/2023       Noted Reaction Type Reactions    Amoxicillin Trihydrate 10/10/2012    PAIN    Stomach pain    Celecoxib 10/10/2012    PAIN    Stomach pain and agitation    Dilaudid [hydromorphone] 05/06/2023    NAUSEA AND VOMITING    Pt never wants to take again. Potassium Clavulanate 10/10/2012    PAIN    Stomach pain    Shrimp 07/13/2022    NAUSEA AND VOMITING    Allergy 10/10/2012        METAL    Vioxx [rofecoxib] 10/10/2012        Reaction: GI upset, stomach pain, and agitation    Mushrooms 09/10/2018    RASH    Watermelon 09/10/2018    RASH      Your Current Medications        Dosage    enoxaparin 40 MG/0.4ML Injection Solution Prefilled Syringe Inject 0.4 mL (40 mg total) into the skin daily for 14 days. oxyCODONE 5 MG Oral Tab Take 1 tablet (5 mg total) by mouth every 4 (four) hours as needed. prochlorperazine (COMPAZINE) 10 mg tablet Take 1 tablet (10 mg total) by mouth every 6 (six) hours as needed. ondansetron 4 MG Oral Tablet Dispersible Take 1 tablet (4 mg total) by mouth every 6 (six) hours as needed for Nausea. ibuprofen 200 MG Oral Tab Take 1 tablet (200 mg total) by mouth every 6 (six) hours as needed for Pain.    levothyroxine 25 MCG Oral Tab Take 1 tablet (25 mcg total) by mouth before breakfast.    azelastine 0.1 % Nasal Solution 2 sprays by Nasal route in the morning and 2 sprays before bedtime. Glucose Blood (CONTOUR NEXT TEST) In Vitro Strip 1 test strip to test blood sugar two times daily. Microlet Lancets Does not apply Misc TEST BLOOD SUGAR TWICE DAILY    Polyvinyl Alcohol (LUBRICANT DROPS OP) Place 1 drop into both eyes daily as needed.     omega-3 fatty acids 1000 MG Oral Cap Take 1,000 mg by mouth 2 (two) times daily. Multiple Vitamins-Minerals (WOMENS MULTIVITAMIN PLUS) Oral Tab Take 1 tablet by mouth 2 (two) times daily. Cholecalciferol (VITAMIN D) 1000 UNITS Oral Cap Take 1 tablet by mouth 2 (two) times daily. Calcium 500 MG Oral Tab Take 1,000 mg by mouth 2 (two) times daily. Diagnoses for This Visit    Gastrointestinal stromal tumor (GIST) of stomach   [[id number]]  -  Primary           We Ordered the Following     Future Labs/Procedures Expected by Expires    BASIC METABOLIC PANEL (8) [[id number] CUSTOM]  5/12/2023 (Approximate) 5/11/2024      Future Appointments        Provider Department    5/17/2023 1:45 PM Elisha Combs                Did you know that [facility] primary care physicians now offer Video Visits through [address] for adult patients for a variety of conditions such as allergies, back pain and cold symptoms? Skip the drive and waiting room and online chat with a doctor face-to-face using your web-cam enabled computer or mobile device wherever you are. Video Visits cost $50 and can be paid hassle-free using a credit, debit, or health savings card. Not active on [employer]? Ask us how to get signed up today! If you receive a survey from [employer], please take a few minutes to complete it and provide feedback. We strive to deliver the best patient experience and are looking for ways to make improvements. Your feedback will help us do so. For more information on [employer], please visit [email or web address]           No text in SmartText           No text in SmartText

## (undated) NOTE — LETTER
DAMARIS Notifier: Wesley/[employer]   JULIANA. Patient Name: Geovanna Yang. Identification Number: [id number]      Advance Beneficiary Notice of Noncoverage (ABN)  NOTE:  If Medicare doesn’t pay for D. Item/service(s) below, you may have to pay.   Medicare does the D. Item/service(s) listed above, but do not bill Medicare. You may ask to be paid now as I am responsible for payment. I cannot appeal if Medicare is not billed. ? OPTION 3. I don’t want the D. Item/service(s) listed above.  I understand with this atwood

## (undated) NOTE — LETTER
Discharge Summary  Initial Functional Outcome Score 44/100  Final Functional Outcome Score 70/100  Number of Visits Attended 7 in Physical Therapy   Pt notes her pain is minimal and she has returned to her PLOF.    Assessment: Pt has been treated in PT for 4.  Pt will have strength at least 4+/5 hip abd and ext  to ease amb and transfers. (met)     Plan: d/c with HEP        Patient/Family/Caregiver was advised of these findings, precautions, and treatment options and has agreed to actively participate in sandro

## (undated) NOTE — LETTER
MEDICATION CONTRAINDICATION     Patient Name: Lisa KOCH-Age: 7957-Z: 76 y Sex:  female   MRN: [id number] [facility]: [id number]        Procedure: Laparoscopic, robotic assisted, possible open partial gastrectomy   Surgery Date:  2023  Anesthesia Type: General  Surgeon(s):  Elaine Glez MD     The above patient has a contraindication to the medication ordered from your standing orders/[facility] Pre-Op Standing orders listed below. Medication: Heparin  Allergies: shrimp,metal, amoicillin,celecoxib,vioxx, mushrooms, watermelon,potassium clavulanate  Contraindication: allergy  If you would like the medication given, please fax this form back indicating the override reason with physician signature/date/time.               ___  Benefit outweighs risk   ___  Insignificant               ___ Low risk                 ___ Not a true allergy              ___ Dose appropriate                ___  Tolerated regimen in the past     Physician Signature: ________________________ Date/Time: ______________  Gibran Tello FORM BACK TO:  [phone]

## (undated) NOTE — LETTER
[facility], [address]  [place] [address]  [facility]: [phone]  FAX: [address], [place]  [address]  [facility]ia [facility] [address]    The patient listed below is scheduled for surgery and needs the following pre-op labs and/or clearance prior to surgery. The patient has been instructed to schedule an appointment with you for a pre-op physical.      Patient: Keeley Lawler  : 4/10/1955    Surgeon:  Dr. James Gibson    Procedure: Laparoscopic, robotic assisted, possible open, partial gastrectomy    Surgery Date:  TB  Location:  [facility]    inpatient    [] Hematocrit/Hemogram [x] EKG     [x] CBC    [] Chest X-Ray    [x] CMP    [] PT/PTT    [] Urinalysis   [] MRSA Nasal Culture    [] Urinalysis with reflex  [] History and Physical    [] Urine pregnancy  [x] Medical Clearance    [] Qualitative HCG (blood) [] Cardiac Clearance      Please fax to fax number indicated above when completed. Call [phone] with any questions. Thank you for your prompt attention to these requirements.     [facility]

## (undated) NOTE — LETTER
September 11, 2018    Da [address] 70  [place]      Dear Jose Manriquez: The following are the results of your recent tests. Please review the list of test results.   Your result is the value on the left; we have also supplied the rang

## (undated) NOTE — MR AVS SNAPSHOT
[place] [address]  [address] [facility]  [phone]                    After Visit Summary   5/11/2023    Elaine Harrison   MRN: [id number]           Visit Information     Date & Time  5/11/2023  3:35 PM Provider  [address] [place]. Phone  [phone]      Your Vital Signs Were     Smoking Status   Never             Allergies as of 5/11/2023  Review status set to Review Complete on 5/11/2023       Noted Reaction Type Reactions    Amoxicillin Trihydrate 10/10/2012    PAIN    Stomach pain    Celecoxib 10/10/2012    PAIN    Stomach pain and agitation    Dilaudid [hydromorphone] 05/06/2023    NAUSEA AND VOMITING    Pt never wants to take again. Potassium Clavulanate 10/10/2012    PAIN    Stomach pain    Shrimp 07/13/2022    NAUSEA AND VOMITING    Allergy 10/10/2012        METAL    Vioxx [rofecoxib] 10/10/2012        Reaction: GI upset, stomach pain, and agitation    Mushrooms 09/10/2018    RASH    Watermelon 09/10/2018    RASH      Your Current Medications        Dosage    enoxaparin 40 MG/0.4ML Injection Solution Prefilled Syringe Inject 0.4 mL (40 mg total) into the skin daily for 14 days. oxyCODONE 5 MG Oral Tab Take 1 tablet (5 mg total) by mouth every 4 (four) hours as needed. prochlorperazine (COMPAZINE) 10 mg tablet Take 1 tablet (10 mg total) by mouth every 6 (six) hours as needed. ondansetron 4 MG Oral Tablet Dispersible Take 1 tablet (4 mg total) by mouth every 6 (six) hours as needed for Nausea. ibuprofen 200 MG Oral Tab Take 1 tablet (200 mg total) by mouth every 6 (six) hours as needed for Pain.    levothyroxine 25 MCG Oral Tab Take 1 tablet (25 mcg total) by mouth before breakfast.    azelastine 0.1 % Nasal Solution 2 sprays by Nasal route in the morning and 2 sprays before bedtime. Glucose Blood (CONTOUR NEXT TEST) In Vitro Strip 1 test strip to test blood sugar two times daily.     Microlet Lancets Does not apply Misc TEST BLOOD SUGAR TWICE DAILY    Polyvinyl Alcohol (LUBRICANT DROPS OP) Place 1 drop into both eyes daily as needed. omega-3 fatty acids 1000 MG Oral Cap Take 1,000 mg by mouth 2 (two) times daily. Multiple Vitamins-Minerals (WOMENS MULTIVITAMIN PLUS) Oral Tab Take 1 tablet by mouth 2 (two) times daily. Cholecalciferol (VITAMIN D) 1000 UNITS Oral Cap Take 1 tablet by mouth 2 (two) times daily. Calcium 500 MG Oral Tab Take 1,000 mg by mouth 2 (two) times daily. Diagnoses for This Visit    Gastrointestinal stromal tumor (GIST) of stomach   [[id number]]  -  Primary           Future Appointments        Provider Department    5/17/2023 1:45 PM Jono, [address] [facility],2 [address] 3 S Floors      To Do List     Around May 12, 2023   LAB:   BASIC METABOLIC PANEL (8)        Wednesday May 17, 2023 1:45 PM   [address] Mariana [address]eehan [address] ([phone])      [address] [facility] [phone]. [phone]        MyChart    Visit MyChart  You can access your MyChart to more actively manage your health care and view more details from this visit by going to [email or web address]. If you've recently had a stay at the Hospital you can access your discharge instructions in [employer] by going to Visits < Admission Summaries. If you've been to the Emergency Department or your doctor's office, you can view your past visit information in [employer] by going to Visits < Visit Summaries. [employer] questions? Call [phone] for help. [employer] is NOT to be used for urgent needs. For medical emergencies, dial 911.

## (undated) NOTE — LETTER
August 31, 2018    Svetlana Wagner 70  [place]      Dear Nico Swift: The following are the results of your recent tests. Please review the list of test results.   Your result is the value on the left; we have also supplied the range o Lymphocyte Absolute 1.38 0.90 - 4.00 x10(3) uL   Monocyte Absolute 0.20 0.10 - 1.00 x10(3) uL   Eosinophil Absolute 0.05 0.00 - 0.30 x10(3) uL   Basophil Absolute 0.02 0.00 - 0.10 x10(3) uL   Immature Granulocyte Absolute 0.01 0.00 - 1.00 x10(3) uL   Neutr

## (undated) NOTE — LETTER
MEDICATION CONTRAINDICATION     Patient Name: Richmond KOCH-Age: -W: 76 y Sex:  female   MRN: [id number] CSN: [id number]        Procedure: Laparoscopic, robotic assisted, possible open partial gastrectomy   Surgery Date:  2023  Anesthesia Type: General  Surgeon(s):  Maureen Orozco MD     The above patient has a contraindication to the medication ordered from your standing orders/[facility] Pre-Op Standing orders listed below. Medication: Mefoxin, Clindamycin,gentamycin  Allergies: yes  Contraindication: yes  If you would like the medication given, please fax this form back indicating the override reason with physician signature/date/time.               ___  Benefit outweighs risk   ___  Insignificant               ___ Low risk                 ___ Not a true allergy              ___ Dose appropriate                ___  Tolerated regimen in the past     Physician Signature: ________________________ Date/Time: ______________  Jose Sandoval FORM BACK TO:  [phone]

## (undated) NOTE — ED AVS SNAPSHOT
[facility] Emergency Department    [address] [address]    Phone:  [phone]    Fax:  [phone]           Craig Rivka   MRN: [id number]    Department:  [facility] Emergency Department   Date of Visit:  4/1/20 Drink lots of water. Small regular meals. Follow up with Dr. Goodman Fees and neurology. Return for any problems    Disclosure     Insurance plans vary and the physician(s) referred by the ER may not be covered by your plan.  Please contact your insurance c If you have been prescribed any medication(s), please fill your prescription right away and begin taking the medication(s) as directed    If the emergency physician has read X-rays, these will be re-interpreted by a radiologist.  If there is a significant can help with your Affordable Care Act coverage, as well as all types of Medicaid plans. To get signed up and covered, please call [phone] and ask to get set up for an insurance coverage that is in-network with [facility] Kilgore

## (undated) NOTE — Clinical Note
Hi, Dr. Reny Harris and Gaby Braxton! Thank you for referring Ms. [address] for rheumatologic evaluation. Please see the discussion portion of my note and let me know if you have any questions.  WILDER [facility]1/8/2019

## (undated) NOTE — Clinical Note
I had the pleasure of seeing [place] [facility] on 6/28/2017. Please see my attached note.   Ashlee Marion MD FACS [facility]--Surgery

## (undated) NOTE — ED AVS SNAPSHOT
[facility] Emergency Department    [place]  [address] [place] [address]    Phone:  [phone]    Fax:  [phone]           Anabell Hubbardail   MRN: [id number]    Department:  [facility] Emergency Department   Date of Visit:  4/1/20 IF THERE IS ANY CHANGE OR WORSENING OF YOUR CONDITION, CALL YOUR PRIMARY CARE PHYSICIAN AT ONCE OR RETURN IMMEDIATELY TO THE EMERGENCY DEPARTMENT.     If you have been prescribed any medication(s), please fill your prescription right away and begin taking t

## (undated) NOTE — MR AVS SNAPSHOT
[facility] [facility] [address] [phone]               Thank you for choosing us for your health care visit with CINDY Pittman.   We are glad to serve you and happy to provide you with this summar PH:  [id number]  [address] in [facility] in [address] [facility], [facility], [address]    [address]   [address] [place] [address]:  630-6 Take 1,000 mg by mouth 2 (two) times daily. Commonly known as:  FISH OIL           ondansetron 4 MG Tbdp   Take 1 tablet (4 mg total) by mouth every 4 (four) hours as needed for Nausea.    Commonly known as:  ZOFRAN-ODT           Vitamin D 1000 units Caps